# Patient Record
Sex: FEMALE | Race: WHITE | NOT HISPANIC OR LATINO | Employment: OTHER | URBAN - METROPOLITAN AREA
[De-identification: names, ages, dates, MRNs, and addresses within clinical notes are randomized per-mention and may not be internally consistent; named-entity substitution may affect disease eponyms.]

---

## 2017-01-06 ENCOUNTER — TELEPHONE (OUTPATIENT)
Dept: NEUROLOGY | Facility: CLINIC | Age: 72
End: 2017-01-06

## 2017-01-06 NOTE — TELEPHONE ENCOUNTER
Reason for Call:  Other records    Detailed comments: pt calling would like to know if Dr. Zacarias has received her records yet from [facility]. She has an appt coming up on Feb 1st    Phone Number Patient can be reached at: Home number on file [phone] (home)    Best Time: any     Can we leave a detailed message on this number? YES    Call taken on 1/6/2017 at 1:36 PM by Adeola Hubbard

## 2017-01-31 DIAGNOSIS — Z53.9 ERRONEOUS ENCOUNTER--DISREGARD: Primary | ICD-10-CM

## 2017-01-31 PROBLEM — M85.80 OSTEOPENIA: Status: ACTIVE | Noted: 2017-01-31

## 2017-01-31 RX ORDER — CITALOPRAM HYDROBROMIDE 40 MG/1
40 TABLET ORAL DAILY
COMMUNITY
Start: 2016-07-26 | End: 2021-09-01

## 2017-01-31 RX ORDER — METHOCARBAMOL 750 MG/1
750 TABLET, FILM COATED ORAL 4 TIMES DAILY PRN
COMMUNITY
Start: 2016-12-05 | End: 2021-08-06

## 2017-01-31 RX ORDER — LANOLIN ALCOHOL/MO/W.PET/CERES
400 CREAM (GRAM) TOPICAL DAILY
COMMUNITY
Start: 2013-06-25 | End: 2021-08-06

## 2017-01-31 RX ORDER — UREA 10 %
500 LOTION (ML) TOPICAL DAILY
COMMUNITY
Start: 2012-08-24 | End: 2021-08-06

## 2017-01-31 RX ORDER — LANOLIN ALCOHOL/MO/W.PET/CERES
250 CREAM (GRAM) TOPICAL DAILY
COMMUNITY
Start: 2009-05-18 | End: 2021-08-06

## 2017-01-31 RX ORDER — AMITRIPTYLINE HYDROCHLORIDE 50 MG/1
50 TABLET ORAL AT BEDTIME
COMMUNITY
Start: 2016-10-11 | End: 2021-08-06

## 2017-01-31 RX ORDER — DESOXIMETASONE 2.5 MG/G
1 CREAM TOPICAL 2 TIMES DAILY
COMMUNITY
Start: 2016-12-05 | End: 2017-02-01

## 2017-02-01 ENCOUNTER — TELEPHONE (OUTPATIENT)
Dept: RHEUMATOLOGY | Facility: CLINIC | Age: 72
End: 2017-02-01

## 2017-02-01 ENCOUNTER — OFFICE VISIT (OUTPATIENT)
Dept: NEUROLOGY | Facility: CLINIC | Age: 72
End: 2017-02-01
Payer: COMMERCIAL

## 2017-02-01 VITALS
BODY MASS INDEX: 29.06 KG/M2 | HEART RATE: 86 BPM | TEMPERATURE: 98 F | DIASTOLIC BLOOD PRESSURE: 82 MMHG | HEIGHT: 64 IN | WEIGHT: 170.2 LBS | SYSTOLIC BLOOD PRESSURE: 118 MMHG

## 2017-02-01 DIAGNOSIS — G44.85 PRIMARY STABBING HEADACHE: Primary | ICD-10-CM

## 2017-02-01 PROCEDURE — 99204 OFFICE O/P NEW MOD 45 MIN: CPT | Performed by: PSYCHIATRY & NEUROLOGY

## 2017-02-01 RX ORDER — INDOMETHACIN 75 MG/1
75 CAPSULE, EXTENDED RELEASE ORAL
Qty: 30 CAPSULE | Refills: 2 | Status: SHIPPED | OUTPATIENT
Start: 2017-02-01 | End: 2017-02-22

## 2017-02-01 NOTE — MR AVS SNAPSHOT
"              After Visit Summary   2/1/2017    Irene Mullins    MRN: [id number]           Patient Information     Date Of Birth          1945        Visit Information        Provider Department      2/1/2017 8:45 AM Lory Zacarias MD [facility]        Care Instructions    Plan:    Indomethacin 75mg daily. Remember this does increase your risk of bleeding, so do avoid aspirin/ibuprofen/Aleve when taking this medication. *Information provided.  Let me know if you notice any improvement in about 2 weeks. We can adjust the dose if needed.  Return to clinic in 2 months.        Follow-ups after your visit        Who to contact     If you have questions or need follow up information about today's clinic visit or your schedule please contact [facility] directly at [phone].  Normal or non-critical lab and imaging results will be communicated to you by MyChart, letter or phone within 4 business days after the clinic has received the results. If you do not hear from us within 7 days, please contact the clinic through MyChart or phone. If you have a critical or abnormal lab result, we will notify you by phone as soon as possible.  Submit refill requests through [employer] or call your pharmacy and they will forward the refill request to us. Please allow 3 business days for your refill to be completed.          Additional Information About Your Visit        MyChart Information     [employer] lets you send messages to your doctor, view your test results, renew your prescriptions, schedule appointments and more. To sign up, go to [email or web address] . Click on \"Log in\" on the left side of the screen, which will take you to the Welcome page. Then click on \"Sign up Now\" on the right side of the page.     You will be asked to enter the access code listed below, as well as some personal information. Please follow the directions to create your username and password.     Your access code is: " [email or web address]  Expires: 2017  8:26 AM     Your access code will  in 90 days. If you need help or a new code, please call your [facility] clinic or [phone].        Care EveryWhere ID     This is your Care EveryWhere ID. This could be used by other organizations to access your [facility] medical records  [id number]        Your Vitals Were     Pulse Temperature Height BMI (Body Mass Index) Breastfeeding?       86 98  F (36.7  C) (Oral) 5' 3.5\" (1.613 m) 29.67 kg/m2 No        Blood Pressure from Last 3 Encounters:   17 118/82    Weight from Last 3 Encounters:   17 170 lb 3.2 oz (77.202 kg)              Today, you had the following     No orders found for display         Today's Medication Changes          These changes are accurate as of: 17  9:31 AM.  If you have any questions, ask your nurse or doctor.               Stop taking these medicines if you haven't already. Please contact your care team if you have questions.     desoximetasone 0.25 % cream   Commonly known as:  TOPICORT   Stopped by:  Lory Zacarias MD                    Primary Care Provider Office Phone # Fax #    [facility] [phone]       [address] [address]        Thank you!     Thank you for choosing [facility]  for your care. Our goal is always to provide you with excellent care. Hearing back from our patients is one way we can continue to improve our services. Please take a few minutes to complete the written survey that you may receive in the mail after your visit with us. Thank you!             Your Updated Medication List - Protect others around you: Learn how to safely use, store and throw away your medicines at www.disposemymeds.org.          This list is accurate as of: 17  9:31 AM.  Always use your most recent med list.                   Brand Name Dispense Instructions for use    amitriptyline 50 MG tablet    ELAVIL     Take 50 mg by " mouth At Bedtime       citalopram 40 MG tablet    celeXA     Take 40 mg by mouth daily       cyanocolbalamin 500 MCG tablet    vitamin  B-12     Take 500 mcg by mouth daily Three times weekly        ULTRA STRENGTH 2000 UNITS Caps   Generic drug:  cholecalciferol      Take 2,000 Units by mouth daily       folic acid 400 MCG tablet    FOLVITE     Take 400 mcg by mouth daily       LIQUID CALCIUM/VITAMIN D 600-200 MG-UNIT Caps   Generic drug:  calcium carbonate-vitamin D      Take 1 tablet by mouth daily       magnesium 500 MG Tabs      Take 500 mg by mouth daily Taking three days weekly       methocarbamol 750 MG tablet   Generic drug:  methocarbamol      Take 750 mg by mouth 4 times daily as needed       MILK OF MAGNESIA PO      Take by mouth every 48 hours as needed       NEW MED      Take 750 mg by mouth daily L-Citruline       niacin 250 MG CR capsule      Take 250 mg by mouth daily       ranitidine 150 MG tablet    ZANTAC     Take 150 mg by mouth daily as needed

## 2017-02-01 NOTE — PROGRESS NOTES
INITIAL NEUROLOGY CONSULTATION    DATE OF VISIT: 2/1/2017  MRN: [id number]  PATIENT NAME: Irene Mullins  YOB: 1945    REFERRING PROVIDER: Gail Bojorquez    Chief Complaint   Patient presents with     Consult     Headache.  Referral by Gail Arce NP-- Bridget Plaza.       SUBJECTIVE:                                                      HPI:   Irene Mullins is a 71 year old female who presents for evaluation of headaches. The patient was seen by a neurologist through [facility] previously. I have one note from this provider. The patient is not sure why she was not sent back to Dr. Ch instead of here. The patient has been complaining of new lancinating head pain which started in 6.2016. She also has a history of migraines and tension-type headaches. She has said that these new pains are unique from her prior headaches. Evaluation included a brain MRI which revealed a meningioma (stable on repeat study, felt to be incidental finding). She does have reported degenerative disc disease in the cervical spine as well. She has tried Topamax which made her foggy and did not provide effective relief. Imitrex and Medrol dose pack were also ineffective. Sed rate was normal as was a temporal artery biopsy (10.2016).    The patient is accompanied by her  in clinic today. She tells me that the pains actually started around April. She subsequently had cataract surgery and the pain continued, and in fact became more frequent by summertime last year. She says the stabbing pain is always on the right (she points to parietal region), no specific triggers. It can occur anytime. Sometimes aspirin and/or Tylenol take the edge off. She had been taking 25mg of amitriptyline for IBS, so this was increased. No change in the stabbing pain frequency. She does endorse some residual tenderness at the temporal artery biopsy site, but otherwise no real change in the headaches since June. She says these occur  daily, spells can be seconds-almost one hour in duration. She denies visual changes. She does have chronic problems with runny nose and post-nasal drip but these do not correlate with the headaches. She does note Right eye puffiness when she wakes in the morning. She also says that sometimes she feels like her tongue is cramping in the morning. She denies difficulty with chewing/swallow. She has popping in the right side of her jaw though, not new. She has not discussed this with her dentist. Her  says that her memory has not been as good as it used to be since the headaches started.     No past medical history on file.  No past surgical history on file.      Current Outpatient Prescriptions on File Prior to Visit:  amitriptyline (ELAVIL) 50 MG tablet Take 50 mg by mouth At Bedtime   calcium carbonate-vitamin D (LIQUID CALCIUM/VITAMIN D) 600-200 MG-UNIT CAPS Take 1 tablet by mouth daily   cholecalciferol ( ULTRA STRENGTH) 2000 UNITS CAPS Take 2,000 Units by mouth daily   citalopram (CELEXA) 40 MG tablet Take 40 mg by mouth daily   cyanocolbalamin (VITAMIN  B-12) 500 MCG tablet Take 500 mcg by mouth daily Three times weekly   folic acid (FOLVITE) 400 MCG tablet Take 400 mcg by mouth daily   NEW MED Take 750 mg by mouth daily L-Citruline   niacin 250 MG CR capsule Take 250 mg by mouth daily   magnesium 500 MG TABS Take 500 mg by mouth daily Taking three days weekly   methocarbamol (METHOCARBAMOL) 750 MG tablet Take 750 mg by mouth 4 times daily as needed   ranitidine (ZANTAC) 150 MG tablet Take 150 mg by mouth daily as needed     No current facility-administered medications on file prior to visit.  Allergies   Allergen Reactions     Oxycodone-Acetaminophen      Other reaction(s): Dizziness, GI Upset     Sulfamethoxazole-Trimethoprim      Other reaction(s): Other - Describe In Comment Field  Hot flashes, face and arms red for 1 hour after taking medication     Tramadol Nausea and Vomiting     Valproic Acid   "    Other reaction(s): Other - Describe In Comment Field  Hair loss, elevated liver enzymes        Problem (# of Occurrences) Relation (Name,Age of Onset)    Coronary Artery Disease (1) Sister (Justine)    DIABETES (1) Sister (Justine)    Hyperlipidemia (2) Brother (Corky), Sister (Justine)    Hypertension (1) Mother       Negative family history of: CEREBROVASCULAR DISEASE, Breast Cancer, Colon Cancer, Prostate Cancer        Social History   Substance Use Topics     Smoking status: Former Smoker     Smokeless tobacco: Not on file     Alcohol Use: Not on file       REVIEW OF SYSTEMS:                                                      10-point review of systems is negative except as mentioned above in HPI.     EXAM:                                                      Physical Exam:   Vitals: /82 mmHg  Pulse 86  Temp(Src) 98  F (36.7  C) (Oral)  Ht 5' 3.5\" (1.613 m)  Wt 170 lb 3.2 oz (77.202 kg)  BMI 29.67 kg/m2  Breastfeeding? No  BMI= Body mass index is 29.67 kg/(m^2).  GENERAL: NAD.  HEENT: Tenderness over healing scar on Rt temple.  Neurologic:  MENTAL STATUS: Alert, attentive. Speech is fluent. Normal comprehension. Mini-cog: 3/5. (missed 2 words on recall). Normal concentration. Adequate fund of knowledge.   CRANIAL NERVES: Discs flat. Visual fields intact to confrontation. Pupils equally, round and reactive to light. Facial sensation and movement normal. EOM full. Hearing intact to conversation. Trapezius strength intact. Palate moves symmetrically. Tongue midline.  MOTOR: 4+/5 in proximal and distal muscle groups of upper and lower extremities. Tone and bulk normal.   DTRs: Intact and symmetric. Babinski down-going bilaterally.   SENSATION: Normal light touch and pinprick. Intact proprioception. Vibration: Slightly decreased at both ankles.   COORDINATION: Normal finger nose finger. Finger tapping normal. Knee heel shin normal.  STATION AND GAIT: Slight sway with Romberg. Tandem minimally " unsteady.  CV: RRR. S1, S2.   NECK: No bruits.    ASSESSMENT and PLAN:                                                      Assessment and Plan:     ICD-10-CM    1. 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 Mullins is a pleasant 70 yo woman with history of migraine and tension-type headaches. She developed new head pain, with characteristics consistent with primary stabbing headache last year. The pain continues despite extensive work-up and multiple medications. She has not tried indomethacin, so I would like to start with a daily dose of this to see if this provides effective headache management. I explained to the patient that this does not necessarily need to be a long-term medication, but I think we should give it a several week trial, barring any side effects idiopathic thrombocytopenic purpura is noted that this increases her bleed risk, especially with concurrent use of amitriptyline and citalopram. We discussed the side effects. Patient understands and agrees with the plan.     Patient Instructions:  Indomethacin 75mg daily. Remember this does increase your risk of bleeding, so do avoid aspirin/ibuprofen/Aleve when taking this medication. *Information provided.  Let me know if you notice any improvement in about 2 weeks. We can adjust the dose if needed.  Return to clinic in 2 months.    Total Time: 45 minutes were spent with the patient. More than 50% of the time spent on counseling (as described above in Assessment and Plan) /coordinating the care.    Lory Zacarias MD  Neurology    CC: Gail [facility] NP

## 2017-02-01 NOTE — PATIENT INSTRUCTIONS
Plan:    Indomethacin 75mg daily. Remember this does increase your risk of bleeding, so do avoid aspirin/ibuprofen/Aleve when taking this medication. *Information provided.  Let me know if you notice any improvement in about 2 weeks. We can adjust the dose if needed.  Return to clinic in 2 months.

## 2017-02-01 NOTE — NURSING NOTE
"Chief Complaint   Patient presents with     Consult     Headache.  Referral by Gail Arce NP-- Bridget Plaza.       Initial /82 mmHg  Pulse 86  Temp(Src) 98  F (36.7  C) (Oral)  Ht 5' 3.5\" (1.613 m)  Wt 170 lb 3.2 oz (77.202 kg)  BMI 29.67 kg/m2  Breastfeeding? No Estimated body mass index is 29.67 kg/(m^2) as calculated from the following:    Height as of this encounter: 5' 3.5\" (1.613 m).    Weight as of this encounter: 170 lb 3.2 oz (77.202 kg).  BP completed using cuff size: regular    Patient prefers to be contacted: [phone]  Okay to leave detailed message on voicemail: yes    Erica HERNANDEZ-CMA      "

## 2017-02-06 NOTE — TELEPHONE ENCOUNTER
Received notice by [employer]/ [employer] and [employer] that indomethacin is approved 11/5/16 to 2/3/18.  #[id number].

## 2017-02-07 ENCOUNTER — TELEPHONE (OUTPATIENT)
Dept: NEUROLOGY | Facility: CLINIC | Age: 72
End: 2017-02-07

## 2017-02-07 NOTE — TELEPHONE ENCOUNTER
Advised  Reviewed precautions to monitor for GI Erosion ect.    Stefanie Ku RN  [facility]

## 2017-02-07 NOTE — TELEPHONE ENCOUNTER
I called to talk to Irene today but she is not home until 11:30 am.   She is having heartburn when she takes her indocin. This is a very common side effect of this medication. She can take heartburn medication unless she has any contraindication to that particular heartburn medication. We will try back later. Olinda HOGAN RN

## 2017-02-15 ENCOUNTER — TELEPHONE (OUTPATIENT)
Dept: NEUROLOGY | Facility: CLINIC | Age: 72
End: 2017-02-15

## 2017-02-15 NOTE — TELEPHONE ENCOUNTER
Reason for Call:  Other prescription    Detailed comments: pt calling rubi she has been on indomethacin for about 2 weeks. Was told to call in and let dr know how she is doing on it.  The first week she states was good. Over all it has been better  5 episodes in 1 hour on the 12 th, 4 in the morning on the 13 th, yesterday she had 1, today she had 3, not has severe most episodes are in the morning.     Phone Number Patient can be reached at: Home number on file [phone] (home)    Best Time: any     Can we leave a detailed message on this number? YES    Call taken on 2/15/2017 at 10:28 AM by Adeola Hubbard

## 2017-02-22 ENCOUNTER — TELEPHONE (OUTPATIENT)
Dept: NEUROLOGY | Facility: CLINIC | Age: 72
End: 2017-02-22

## 2017-02-22 DIAGNOSIS — G44.85 PRIMARY STABBING HEADACHE: ICD-10-CM

## 2017-02-22 NOTE — TELEPHONE ENCOUNTER
We could try adding a pm dose of 75mg. This should be ok in terms of side effects because she can take it before bedtime and avoid the fogginess. I think it might be too soon to say that the medication is not working. That being said, if she develops severe / prolonged headache again she may need to seek acute care through an ED. Regarding alternatives, I think this is     She can take MOM with the medication. She can also take a PPI such as omeprazole if needed.     I would ask that Irene make an appointment to follow-up, since she has several concerns. We can discuss alternative treatments at that time. Hopefully the above will be helpful in the meantime.     Lory Zacarias

## 2017-02-22 NOTE — TELEPHONE ENCOUNTER
Call placed to patient to   Around the 13th she started having the morning episodes ( stabbing pain intermit lasting only 60 seconds then repeat)  Next day only had one  Then next day had three etc- it varied.     Then a few days later had severe 10/10 on pain scale lasted several minutes longer than ever before then a really bad one happened again this morning.  Indomethacin dose did not change abort the headache course.   ( Usually takes some time to kick in but this time could not tell a difference at all.)  (Can tell when the Indomethacin is taken because has dizziness and Fog about 1 hour later. = did however have it with Oatmeal vs a simple english muffin this morning).    She states she is no longer having the stomach pain or heartburn with the medication.  Denies any side effect besides the fogginess.  1:  Please advise on Medication or alternative meds?    Other questions are...    2:  Are there any stomach meds that would interfere if heartburn were to return? RN search found No interactions found or any that would delay absorption per [employer]. (can we advise pt of this?)          3:   Can she take her MOM when she needs it or should she not ever take it the same time as the Indomethacin-  RN found no restrictions on taking the med in correlation to Indomethacin doses. (can we  advise patient of this?)

## 2017-02-22 NOTE — TELEPHONE ENCOUNTER
Reason for Call:  Patient is calling in states that originally the indomethacin was working well however it has stopped working    She has follow up appt scheduled for April 4th    Detailed comments: please advise recommendations    Phone Number Patient can be reached at: Home number on file [phone] (home)    Best Time: any    Can we leave a detailed message on this number? YES    Call taken on 2/22/2017 at 9:53 AM by Kate Chu

## 2017-02-22 NOTE — TELEPHONE ENCOUNTER
Advised.  Confirmed she has the April 4 appt.  Stefanie Ku RN  [facility]

## 2017-02-23 RX ORDER — INDOMETHACIN 75 MG/1
75 CAPSULE, EXTENDED RELEASE ORAL 2 TIMES DAILY WITH MEALS
Qty: 60 CAPSULE | Refills: 2 | Status: SHIPPED | OUTPATIENT
Start: 2017-02-23 | End: 2021-08-06

## 2017-02-23 NOTE — TELEPHONE ENCOUNTER
Previous rx required PA for 1 daily.  Now will be taking BID.  May need another STAT PA completed.    Stefanie Ku RN  [facility]

## 2017-02-23 NOTE — TELEPHONE ENCOUNTER
Patient is calling inquiring when new rx for the indomethacin would be sent to [facility]?    She will be out by Sunday    She can be reached at [phone]

## 2017-02-27 NOTE — TELEPHONE ENCOUNTER
Received notice from [employer] that they are not her pharmacy medication .  They're asking that we call [employer] to determine who should receive the PA.      [facility], since this needs to be stat, per RN, would you be able to do this PA?  Thank you so much.

## 2017-03-09 ENCOUNTER — TELEPHONE (OUTPATIENT)
Dept: NEUROLOGY | Facility: CLINIC | Age: 72
End: 2017-03-09

## 2017-03-09 NOTE — TELEPHONE ENCOUNTER
Reason for Call:  Patient is calling in reports that at first the increase in dose of the indomethacin was helpful however in the past 3 days she is waking up with headaches again    Detailed comments: please advis    Phone Number Patient can be reached at: Home number on file [phone] (home)    Best Time: any    Can we leave a detailed message on this number? YES    Call taken on 3/9/2017 at 9:16 AM by Kate Chu

## 2017-03-10 NOTE — TELEPHONE ENCOUNTER
I do not think we should increase the dose further at this point. If the headaches continue, I would like Irene to come in to clinic next week (use Weds or Thurs 12:45 slot). If the pain acutely worsens, she should go to the ED.    CY

## 2017-03-13 NOTE — TELEPHONE ENCOUNTER
Patient wanted to schedule for this week.  She's been scheduled for 1245 on Wednesday 3/15/17.  She will cancel if feeling better.

## 2017-03-15 ENCOUNTER — TELEPHONE (OUTPATIENT)
Dept: PALLIATIVE MEDICINE | Facility: CLINIC | Age: 72
End: 2017-03-15

## 2017-03-15 ENCOUNTER — OFFICE VISIT (OUTPATIENT)
Dept: NEUROLOGY | Facility: CLINIC | Age: 72
End: 2017-03-15
Payer: COMMERCIAL

## 2017-03-15 VITALS — HEART RATE: 84 BPM | TEMPERATURE: 98.3 F | SYSTOLIC BLOOD PRESSURE: 134 MMHG | DIASTOLIC BLOOD PRESSURE: 86 MMHG

## 2017-03-15 DIAGNOSIS — G43.909 MIXED MIGRAINE AND MUSCLE CONTRACTION HEADACHE: Primary | ICD-10-CM

## 2017-03-15 DIAGNOSIS — G44.209 MIXED MIGRAINE AND MUSCLE CONTRACTION HEADACHE: Primary | ICD-10-CM

## 2017-03-15 DIAGNOSIS — G44.85 PRIMARY STABBING HEADACHE: ICD-10-CM

## 2017-03-15 PROCEDURE — 99214 OFFICE O/P EST MOD 30 MIN: CPT | Performed by: PSYCHIATRY & NEUROLOGY

## 2017-03-15 NOTE — PATIENT INSTRUCTIONS
Plan:    Continue the indomethacin for now: 75mg twice daily.  Try melatonin: 3mg at bedtime.   Let me know if you notice any headache improvement within about one week. We may need to adjust the dose.  Referral to pain clinic.  Return to clinic in 3 months.

## 2017-03-15 NOTE — TELEPHONE ENCOUNTER
Left VM for patient to schedule a new evaluation.      Mayte MERCADO    [facility]

## 2017-03-15 NOTE — PROGRESS NOTES
ESTABLISHED PATIENT NEUROLOGY NOTE    DATE OF VISIT: 3/15/2017  MRN: [id number]  PATIENT NAME: Irene Mullins  YOB: 1945    Chief Complaint   Patient presents with     RECHECK     Headaches.     SUBJECTIVE:                                                      HISTORY OF PRESENT ILLNESS:  Irene is here for follow up regarding headaches. The patient has a history of migraine headaches, tension-type headaches and newer headaches with characteristics of primary stabbing headaches.The patient had extensive work-up through her previous and no underlying etiology for the pain was found. She has tried several medications without achieving effectiveness, including: Topamax, Imitrex and steroids. Given the stabbing nature of her pain, we decided to try Indomethacin. She did develop some heartburn initially but noted improvement of the headaches. Now she returns for an urgent consult to again address the headaches.    The patient is again accompanied by her  in clinic today. She says that she had clusters of brief stabbing pain both last night and this morning. She continues to take the indomethacin at 75mg BID and notices that perhaps it makes her briefly tired. No longer any GI upset from the medication. She did notice some blood in her urine recently but evaluation led to diagnosis of UTI. The migraines and tension-type headaches continue to be rare.     The stabbing pain occurs in one localized area in the right parietal region. The pain sometimes lancinates toward the right eye. Again,t he episodes are seconds-long but she can have several in one hour's time. She denies tearing of the eye or runny nose with the pain. She continues to have tenderness of her temporal artery biopsy spot but the pain seems unrelated to this. She says that sleep is good.      The patient reminds me that she has also tried acupuncture, chiropractic treatments and physical therapy for headaches and neck pain and has found  these methods only transiently helpful.     History includes stable right temporal meningioma. Most recent imaging November 2016.       CURRENT MEDICATIONS:     Current Outpatient Prescriptions on File Prior to Visit:  indomethacin (INDOCIN SR) 75 MG CR capsule Take 1 capsule (75 mg) by mouth 2 times daily (with meals)   Magnesium Hydroxide (MILK OF MAGNESIA PO) Take by mouth every 48 hours as needed   amitriptyline (ELAVIL) 50 MG tablet Take 50 mg by mouth At Bedtime   calcium carbonate-vitamin D (LIQUID CALCIUM/VITAMIN D) 600-200 MG-UNIT CAPS Take 1 tablet by mouth daily   cholecalciferol ( ULTRA STRENGTH) 2000 UNITS CAPS Take 2,000 Units by mouth daily   citalopram (CELEXA) 40 MG tablet Take 40 mg by mouth daily   cyanocolbalamin (VITAMIN  B-12) 500 MCG tablet Take 500 mcg by mouth daily Three times weekly   folic acid (FOLVITE) 400 MCG tablet Take 400 mcg by mouth daily   NEW MED Take 750 mg by mouth daily L-Citruline   niacin 250 MG CR capsule Take 250 mg by mouth daily   magnesium 500 MG TABS Take 500 mg by mouth daily Taking three days weekly   ranitidine (ZANTAC) 150 MG tablet Take 150 mg by mouth daily as needed Reported on 3/15/2017   methocarbamol (METHOCARBAMOL) 750 MG tablet Take 750 mg by mouth 4 times daily as needed Reported on 3/15/2017     No current facility-administered medications on file prior to visit.       REVIEW OF SYSTEMS:                                                      10-point review of systems is negative except as mentioned above in HPI.    EXAM:                                                      Physical Exam:   Vitals: /86 (BP Location: Right arm, Patient Position: Chair, Cuff Size: Adult Regular)  Pulse 84  Temp 98.3  F (36.8  C) (Oral)  BMI= There is no height or weight on file to calculate BMI.  HEENT: Tenderness over healing scar on Rt temple. Area of stabbing pain is above the scar.   Neurologic:  MENTAL STATUS: Alert, attentive. Speech is fluent. Normal  comprehension. Normal concentration. Adequate fund of knowledge.   CRANIAL NERVES: Discs flat. Visual fields intact to confrontation. Pupils equally, round and reactive to light. Facial sensation and movement normal. EOM full. Hearing intact to conversation. Trapezius strength intact. Palate moves symmetrically. Tongue midline.  MOTOR: 4+/5 in proximal and distal muscle groups of upper and lower extremities with brief testing. Tone and bulk normal.   COORDINATION: Normal finger nose finger.  CV: RRR. S1, S2.   NECK: No bruits.    ASSESSMENT and PLAN:                                                      Assessment and Plan:    ICD-10-CM    1. Mixed migraine and muscle contraction headache G43.909 [facility] ([place] REFERRAL    G44.209    2. Primary stabbing headache G44.85 [facility] ([place] REFERRAL      Ms. Mullins is a pleasant 70 yo woman with history of mixed headaches and newer headache consistent with primary stabbing headache. We discussed alternatives / adjunctive therapy for the indomethacin. We will try adding melatonin and see if she experiences any improvement with this. For the long-term, I am not sure if the indomethacin is best. The patient is not interested in increasing the amitriptyline (and I am not certain this would help with the current headaches anyway). Given that she has tried several medications and alternative treatments without lasting effectiveness, I suggested that we have her consult with a pain specialist for additional ideas. The patient understands and agrees with the plan.     After the patient left clinic, I was thinking that we could consider treating the patient with a neuropathic pain medication such as gabapentin as an alternative, if the current plan is ineffective.     Patient Instructions:  Continue the indomethacin for now: 75mg twice daily.  Try melatonin: 3mg at bedtime.   Let me know if you notice any headache improvement within about one  week. We may need to adjust the dose.  Referral to pain clinic.  Return to clinic in 3 months.     Total Time: 25 minutes were spent with the patient. More than 50% of the time spent on counseling (as described above in Assessment and Plan) /coordinating the care.    Lory Zacarias MD  Neurology

## 2017-03-15 NOTE — LETTER
March 15, 2017    Irene Mullins  [address] [address] [facility]  [place] [address]    Dear Irene,                                                                 Welcome to the [facility].  We are located on the 2nd floor ([address]) of the [facility], located at [address] [place], [place] [address], [place] [address].    Your appointment at the [facility] has been scheduled on Wednesday MAY 10,2017 at 1:00 PM with Hannah Quiroga NP and CARLOS ZENG LP    At your first visit, you will meet your team of caregivers who will help you to develop pain management strategies that will last a lifetime. You will meet with our support staff to review your insurance information, and collect your co-payment if required by your insurance company. You will also meet with a medical pain specialist, health psychologist, and care coordinator who will assess your pain and develop a plan of care for your successful pain rehabilitation. You should expect to spend 2-3 hours at your first visit with us. Usually, patients work with us for a period of 6-12 months, and eventually return to their primary doctor once their pain management has stabilized.     To help us make your visit go as smoothly as possible, please bring the following items with you on your visit:     Completed Pain Questionnaire enclosed in this packet.  If you do not bring the completed questionnaire, we may have to reschedule your appointment.  List of any medicines that you are currently taking or have been prescribed  Important NON-[facility] medical information such as medical records or tests results (X-rays, or laboratory tests)  Your health insurance card  Financial resources to cover your co-payment or balance due at the time of service (cash, personal check, Visa, and MasterCard are acceptable methods of payment)     Due to the demand for new patient evaluations, you must notify the scheduling department 48 hours in advance if you are  not able to keep this appointment. Failure to do so could affect your ability to reschedule with our clinic. Please be aware that we will not prescribe any medications at your first visit.     Please call [phone] with any questions regarding your appointment. We look forward to meeting you and working to address your health care needs.     Sincerely,    [facility]

## 2017-03-15 NOTE — MR AVS SNAPSHOT
After Visit Summary   3/15/2017    Irene Mullins    MRN: [id number]           Patient Information     Date Of Birth          1945        Visit Information        Provider Department      3/15/2017 12:45 PM Lory Zacarias MD [facility]        Today's Diagnoses     Mixed migraine and muscle contraction headache    -  1    Primary stabbing headache          Care Instructions    Plan:    Continue the indomethacin for now: 75mg twice daily.  Try melatonin: 3mg at bedtime.   Let me know if you notice any headache improvement within about one week. We may need to adjust the dose.  Referral to pain clinic.  Return to clinic in 3 months.         Follow-ups after your visit        Additional Services     [facility] ([place]) REFERRAL       Your provider has referred you to the [facility].    Reason for Referral: Comprehensive Evaluation and Management    Please complete the following questions:    What is your diagnosis for the patient's pain? Mixed headaches, difficult to control    Do you have any specific questions for the pain specialist? No    Are there any red flags that may impact the assessment or management of the patient? None    **ANY DIAGNOSTIC TESTS THAT ARE NOT IN EPIC SHOULD BE SENT TO THE PAIN CENTER**    Please note the Pre-Op Pain Consult must be scheduled 2-3 weeks prior to the patient's surgery.  Patient's trying to schedule within 2 weeks of surgery may not be accommodated.     Pre-Op Pain Consults are only good for 30 days.    REGARDING OPIOID MEDICATIONS:  We will always address appropriateness of opioid pain medications, but we generally will not automatically take on a prescribing role. When we do take on prescribing of opioids for chronic pain, it is in collaboration with the referring physician for an intermediate period of time (months), with an expectation that the primary physician or provider will assume the prescribing role if  medications are effective at stable doses with demonstrated compliance.  Therefore, please do not assume that your prescribing responsibilities end on the day of pain clinic consultation.  Is this agreeable to you? YES    For any questions, contact the [facility] at [phone].    Please be aware that coverage of these services is subject to the terms and limitations of your health insurance plan.  Call member services at your health plan with any benefit or coverage questions.      Please bring the following with you to your appointment:    (1) Any X-Rays, CTs or MRIs which have been performed.  Contact the facility where they were done to arrange for  prior to your scheduled appointment.    (2) List of current medications   (3) This referral request   (4) Any documents/labs given to you for this referral                  Your next 10 appointments already scheduled     Apr 04, 2017  9:30 AM CDT   Return Visit with Lory Zacarias MD   [facility]    [address] [id number] [phone]              Who to contact     If you have questions or need follow up information about today's clinic visit or your schedule please contact [facility] directly at [phone].  Normal or non-critical lab and imaging results will be communicated to you by MyChart, letter or phone within 4 business days after the clinic has received the results. If you do not hear from us within 7 days, please contact the clinic through MyChart or phone. If you have a critical or abnormal lab result, we will notify you by phone as soon as possible.  Submit refill requests through [employer] or call your pharmacy and they will forward the refill request to us. Please allow 3 business days for your refill to be completed.          Additional Information About Your Visit        [employer] Information     [employer] lets you send messages to your  "doctor, view your test results, renew your prescriptions, schedule appointments and more. To sign up, go to [email or web address] . Click on \"Log in\" on the left side of the screen, which will take you to the Welcome page. Then click on \"Sign up Now\" on the right side of the page.     You will be asked to enter the access code listed below, as well as some personal information. Please follow the directions to create your username and password.     Your access code is: [email or web address]  Expires: 2017  9:26 AM     Your access code will  in 90 days. If you need help or a new code, please call your [facility] clinic or [phone].        Care EveryWhere ID     This is your Care EveryWhere ID. This could be used by other organizations to access your [facility] medical records  [id number]        Your Vitals Were     Pulse Temperature                84 98.3  F (36.8  C) (Oral)           Blood Pressure from Last 3 Encounters:   03/15/17 134/86   17 118/82    Weight from Last 3 Encounters:   17 170 lb 3.2 oz (77.2 kg)              We Performed the Following     [facility] ([place]) REFERRAL        Primary Care Provider Office Phone # Fax #    Gail JENKINS Ambrose Bojorquez [phone]       [address] [address]        Thank you!     Thank you for choosing [facility]  for your care. Our goal is always to provide you with excellent care. Hearing back from our patients is one way we can continue to improve our services. Please take a few minutes to complete the written survey that you may receive in the mail after your visit with us. Thank you!             Your Updated Medication List - Protect others around you: Learn how to safely use, store and throw away your medicines at www.disposemymeds.org.          This list is accurate as of: 3/15/17  1:27 PM.  Always use your most recent med list.                   Brand Name Dispense Instructions " for use    amitriptyline 50 MG tablet    ELAVIL     Take 50 mg by mouth At Bedtime       citalopram 40 MG tablet    celeXA     Take 40 mg by mouth daily       cyanocolbalamin 500 MCG tablet    vitamin  B-12     Take 500 mcg by mouth daily Three times weekly        ULTRA STRENGTH 2000 UNITS Caps   Generic drug:  cholecalciferol      Take 2,000 Units by mouth daily       FISH OIL PO      Take 1,200 mg by mouth daily       folic acid 400 MCG tablet    FOLVITE     Take 400 mcg by mouth daily       indomethacin 75 MG CR capsule    INDOCIN SR    60 capsule    Take 1 capsule (75 mg) by mouth 2 times daily (with meals)       LIQUID CALCIUM/VITAMIN D 600-200 MG-UNIT Caps   Generic drug:  calcium carbonate-vitamin D      Take 1 tablet by mouth daily       magnesium 500 MG Tabs      Take 500 mg by mouth daily Taking three days weekly       methocarbamol 750 MG tablet   Generic drug:  methocarbamol      Take 750 mg by mouth 4 times daily as needed Reported on 3/15/2017       MILK OF MAGNESIA PO      Take by mouth every 48 hours as needed       NEW MED      Take 750 mg by mouth daily L-Citruline       niacin 250 MG CR capsule      Take 250 mg by mouth daily       ranitidine 150 MG tablet    ZANTAC     Take 150 mg by mouth daily as needed Reported on 3/15/2017

## 2017-03-15 NOTE — NURSING NOTE
"Chief Complaint   Patient presents with     RECHECK     Headaches.       Initial /86 (BP Location: Right arm, Patient Position: Chair, Cuff Size: Adult Regular)  Pulse 84  Temp 98.3  F (36.8  C) (Oral) Estimated body mass index is 29.68 kg/(m^2) as calculated from the following:    Height as of 2/1/17: 5' 3.5\" (1.613 m).    Weight as of 2/1/17: 170 lb 3.2 oz (77.2 kg).  Medication Reconciliation: complete    Patient prefers to be contacted: [phone]  Okay to leave detailed message on voicemail: yes    Erica Degroot NR-CMA    "

## 2017-03-23 ENCOUNTER — TELEPHONE (OUTPATIENT)
Dept: NEUROLOGY | Facility: CLINIC | Age: 72
End: 2017-03-23

## 2017-03-23 NOTE — TELEPHONE ENCOUNTER
Reason for Call:  Other prescription    Detailed comments: pt calling stating she was to call and f/u to let dr know how the medication has been working. She has been taking indomethacin and melatonin for about 2 weeks now and states that she has been sleeping well w/ no bad episodes. She feels it is working better taking both medications      Phone Number Patient can be reached at: Home number on file [phone] (home)    Best Time: any     Can we leave a detailed message on this number? YES    Call taken on 3/23/2017 at 10:51 AM by Adeola Hubbard

## 2017-03-25 ENCOUNTER — MEDICAL CORRESPONDENCE (OUTPATIENT)
Dept: HEALTH INFORMATION MANAGEMENT | Facility: CLINIC | Age: 72
End: 2017-03-25

## 2017-03-30 ENCOUNTER — TELEPHONE (OUTPATIENT)
Dept: NEUROLOGY | Facility: CLINIC | Age: 72
End: 2017-03-30

## 2017-05-02 ENCOUNTER — TELEPHONE (OUTPATIENT)
Dept: NEUROLOGY | Facility: CLINIC | Age: 72
End: 2017-05-02

## 2017-05-02 NOTE — TELEPHONE ENCOUNTER
To Dr. Zacarias as SADAFI.     Valeri Diggs  [facility] RN

## 2017-05-02 NOTE — TELEPHONE ENCOUNTER
Reason for Call:  Patient is calling in reports that she is taking melatonin and indomethacin and she has had much improvement    She has cancelled appts at Pain Clinic as well as follow up with Dr Zacarias as PCP will be following her now    Detailed comments: see above    Phone Number Patient can be reached at: Home number on file [phone] (home)    Best Time: any    Can we leave a detailed message on this number? YES    Call taken on 5/2/2017 at 3:54 PM by Kate Chu

## 2020-08-11 NOTE — TELEPHONE ENCOUNTER
Received fax notice from [facility] that patient's Indomethacin needs a PA (Neurology).     verbal instruction/written material

## 2021-08-06 ENCOUNTER — OFFICE VISIT (OUTPATIENT)
Dept: FAMILY MEDICINE | Facility: CLINIC | Age: 76
End: 2021-08-06
Payer: COMMERCIAL

## 2021-08-06 VITALS
BODY MASS INDEX: 23.09 KG/M2 | HEART RATE: 65 BPM | TEMPERATURE: 97.7 F | DIASTOLIC BLOOD PRESSURE: 72 MMHG | SYSTOLIC BLOOD PRESSURE: 110 MMHG | WEIGHT: 132.4 LBS | RESPIRATION RATE: 12 BRPM | OXYGEN SATURATION: 99 %

## 2021-08-06 DIAGNOSIS — M62.81 GENERALIZED MUSCLE WEAKNESS: ICD-10-CM

## 2021-08-06 DIAGNOSIS — R41.3 MEMORY LOSS: Primary | ICD-10-CM

## 2021-08-06 DIAGNOSIS — D49.7 NEOPLASM OF MENINGES: ICD-10-CM

## 2021-08-06 LAB
ANION GAP SERPL CALCULATED.3IONS-SCNC: 6 MMOL/L (ref 3–14)
BASOPHILS # BLD AUTO: 0 10E3/UL (ref 0–0.2)
BASOPHILS NFR BLD AUTO: 1 %
BUN SERPL-MCNC: 12 MG/DL (ref 7–30)
CALCIUM SERPL-MCNC: 9.3 MG/DL (ref 8.5–10.1)
CHLORIDE BLD-SCNC: 101 MMOL/L (ref 94–109)
CO2 SERPL-SCNC: 28 MMOL/L (ref 20–32)
CREAT SERPL-MCNC: 0.78 MG/DL (ref 0.52–1.04)
CRP SERPL-MCNC: <2.9 MG/L (ref 0–8)
EOSINOPHIL # BLD AUTO: 0.1 10E3/UL (ref 0–0.7)
EOSINOPHIL NFR BLD AUTO: 2 %
ERYTHROCYTE [DISTWIDTH] IN BLOOD BY AUTOMATED COUNT: 13.5 % (ref 10–15)
ERYTHROCYTE [SEDIMENTATION RATE] IN BLOOD BY WESTERGREN METHOD: 10 MM/HR (ref 0–30)
GFR SERPL CREATININE-BSD FRML MDRD: 75 ML/MIN/1.73M2
GLUCOSE BLD-MCNC: 85 MG/DL (ref 70–99)
HCT VFR BLD AUTO: 39.1 % (ref 35–47)
HGB BLD-MCNC: 12.6 G/DL (ref 11.7–15.7)
LYMPHOCYTES # BLD AUTO: 2.6 10E3/UL (ref 0.8–5.3)
LYMPHOCYTES NFR BLD AUTO: 42 %
MCH RBC QN AUTO: 30 PG (ref 26.5–33)
MCHC RBC AUTO-ENTMCNC: 32.2 G/DL (ref 31.5–36.5)
MCV RBC AUTO: 93 FL (ref 78–100)
MONOCYTES # BLD AUTO: 0.5 10E3/UL (ref 0–1.3)
MONOCYTES NFR BLD AUTO: 8 %
NEUTROPHILS # BLD AUTO: 3 10E3/UL (ref 1.6–8.3)
NEUTROPHILS NFR BLD AUTO: 48 %
PLATELET # BLD AUTO: 264 10E3/UL (ref 150–450)
POTASSIUM BLD-SCNC: 4 MMOL/L (ref 3.4–5.3)
RBC # BLD AUTO: 4.2 10E6/UL (ref 3.8–5.2)
SODIUM SERPL-SCNC: 135 MMOL/L (ref 133–144)
TSH SERPL DL<=0.005 MIU/L-ACNC: 0.97 MU/L (ref 0.4–4)
WBC # BLD AUTO: 6.2 10E3/UL (ref 4–11)

## 2021-08-06 PROCEDURE — 86140 C-REACTIVE PROTEIN: CPT | Performed by: FAMILY MEDICINE

## 2021-08-06 PROCEDURE — 85652 RBC SED RATE AUTOMATED: CPT | Performed by: FAMILY MEDICINE

## 2021-08-06 PROCEDURE — 85025 COMPLETE CBC W/AUTO DIFF WBC: CPT | Performed by: FAMILY MEDICINE

## 2021-08-06 PROCEDURE — 36415 COLL VENOUS BLD VENIPUNCTURE: CPT | Performed by: FAMILY MEDICINE

## 2021-08-06 PROCEDURE — 84443 ASSAY THYROID STIM HORMONE: CPT | Performed by: FAMILY MEDICINE

## 2021-08-06 PROCEDURE — 82607 VITAMIN B-12: CPT | Performed by: FAMILY MEDICINE

## 2021-08-06 PROCEDURE — 99204 OFFICE O/P NEW MOD 45 MIN: CPT | Performed by: FAMILY MEDICINE

## 2021-08-06 PROCEDURE — 80048 BASIC METABOLIC PNL TOTAL CA: CPT | Performed by: FAMILY MEDICINE

## 2021-08-06 RX ORDER — MEMANTINE HYDROCHLORIDE 5 MG-10 MG
1 KIT ORAL
COMMUNITY
End: 2021-11-12

## 2021-08-06 RX ORDER — GINSENG 100 MG
CAPSULE ORAL
COMMUNITY
End: 2022-11-30

## 2021-08-06 NOTE — NURSING NOTE
"Initial /72   Pulse 65   Temp 97.7  F (36.5  C) (Tympanic)   Resp 12   Wt 60.1 kg (132 lb 6.4 oz)   SpO2 99%   BMI 23.09 kg/m   Estimated body mass index is 23.09 kg/m  as calculated from the following:    Height as of 2/1/17: 1.613 m (5' 3.5\").    Weight as of this encounter: 60.1 kg (132 lb 6.4 oz). .      "

## 2021-08-06 NOTE — PROGRESS NOTES
Assessment & Plan     Memory loss  On namenda  Has not seen neurologist to her knowledge  - TSH with free T4 reflex; Future  - Vitamin B12; Future  - TSH with free T4 reflex  - Vitamin B12    Generalized muscle weakness  R/o PMR    - CBC with platelets and differential; Future  - TSH with free T4 reflex; Future  - Basic metabolic panel  (Ca, Cl, CO2, Creat, Gluc, K, Na, BUN); Future  - ESR: Erythrocyte sedimentation rate; Future  - CRP, inflammation; Future  - CBC with platelets and differential  - TSH with free T4 reflex  - Basic metabolic panel  (Ca, Cl, CO2, Creat, Gluc, K, Na, BUN)  - ESR: Erythrocyte sedimentation rate  - CRP, inflammation    Meningioma  Told she only needs MRI every 2 years now    Review of prior external note(s) from - Outside records from [facility]             No follow-ups on file.    Lynda Bal MD  [facility]    Kacy Bonilla is a 76 year old who presents for the following health issues     HPI     New Patient/Transfer of Care  Moved from [place] - I take care of her  Bud also.        Left Leg Pain    Symptoms 2 weeks, states no injury/incident.     States pain from left lateral hip to to shin or toes.     Feels like cramping in the muscles.    Will wake with cramps at times.    States that no movements or activity increase the pain.    Has tried pain cream and Tylenol extra strength BID. States not helpful.    Wondering if this is due to medication mementine for memory.    Has done physical therapy for balance    Rates pain at  7/10 while at rest, 9/10 at worst.    Charley horses in her legs - hurts from left hip all the way down to her toes.  Lasted about an hours.  Has been going on for six months or so.    No fall or injury.   Sometimes will get a few hours of sleep (melatonin and tylenol).     Had meclizine for vertigo  Did PT for balance  Sometimes will get lightheaded if she goes around a corner or gets up to quickly.      Is on  Memantine for her memory      Headaches  States that she gets Ice pick headaches for years.  States that she gets very sharp stabbing pains on right side of head.  States that she has seen specialists for this, but they are unable to find a cause.  Will take extra strength Tylenol for pain.  Medications for nausea (mecliazine)  makes nausea worse.      States that she also has pain and weakness in her bilateral wrists    Review of Systems   Constitutional, HEENT, cardiovascular, pulmonary, gi and gu systems are negative, except as otherwise noted.      Objective    /72   Pulse 65   Temp 97.7  F (36.5  C) (Tympanic)   Resp 12   Wt 60.1 kg (132 lb 6.4 oz)   SpO2 99%   BMI 23.09 kg/m    Body mass index is 23.09 kg/m .  Physical Exam   GENERAL: healthy, alert and no distress  NECK: no adenopathy, no asymmetry, masses, or scars and thyroid normal to palpation  RESP: lungs clear to auscultation - no rales, rhonchi or wheezes  CV: regular rate and rhythm, normal S1 S2, no S3 or S4, no murmur, click or rub, no peripheral edema and peripheral pulses strong  ABDOMEN: soft, nontender, no hepatosplenomegaly, no masses and bowel sounds normal  MS: no gross musculoskeletal defects noted, no edema  NEURO: sensory exam grossly normal and mentation intact for the most part. Does have some forgetful moments.  Strength -  equal bilaterally but not very strong  Hip flexors are 4/5  Quads tend to tremor/fasiculate when they are tested    Results for orders placed or performed in visit on 08/06/21 (from the past 24 hour(s))   CBC with platelets and differential    Narrative    The following orders were created for panel order CBC with platelets and differential.  Procedure                               Abnormality         Status                     ---------                               -----------         ------                     CBC with platelets and d...[[id number]]                      Final result                  Please view results for these tests on the individual orders.   ESR: Erythrocyte sedimentation rate   Result Value Ref Range    Erythrocyte Sedimentation Rate 10 0 - 30 mm/hr   CBC with platelets and differential   Result Value Ref Range    WBC Count 6.2 4.0 - 11.0 10e3/uL    RBC Count 4.20 3.80 - 5.20 10e6/uL    Hemoglobin 12.6 11.7 - 15.7 g/dL    Hematocrit 39.1 35.0 - 47.0 %    MCV 93 78 - 100 fL    MCH 30.0 26.5 - 33.0 pg    MCHC 32.2 31.5 - 36.5 g/dL    RDW 13.5 10.0 - 15.0 %    Platelet Count 264 150 - 450 10e3/uL    % Neutrophils 48 %    % Lymphocytes 42 %    % Monocytes 8 %    % Eosinophils 2 %    % Basophils 1 %    Absolute Neutrophils 3.0 1.6 - 8.3 10e3/uL    Absolute Lymphocytes 2.6 0.8 - 5.3 10e3/uL    Absolute Monocytes 0.5 0.0 - 1.3 10e3/uL    Absolute Eosinophils 0.1 0.0 - 0.7 10e3/uL    Absolute Basophils 0.0 0.0 - 0.2 10e3/uL

## 2021-08-07 LAB — VIT B12 SERPL-MCNC: 506 PG/ML (ref 193–986)

## 2021-09-01 ENCOUNTER — OFFICE VISIT (OUTPATIENT)
Dept: FAMILY MEDICINE | Facility: CLINIC | Age: 76
End: 2021-09-01
Payer: COMMERCIAL

## 2021-09-01 VITALS
HEART RATE: 69 BPM | BODY MASS INDEX: 23.57 KG/M2 | TEMPERATURE: 97.6 F | DIASTOLIC BLOOD PRESSURE: 70 MMHG | HEIGHT: 63 IN | WEIGHT: 133 LBS | SYSTOLIC BLOOD PRESSURE: 112 MMHG | RESPIRATION RATE: 16 BRPM | OXYGEN SATURATION: 100 %

## 2021-09-01 DIAGNOSIS — R41.3 MEMORY LOSS: ICD-10-CM

## 2021-09-01 DIAGNOSIS — G89.29 CHRONIC NONINTRACTABLE HEADACHE, UNSPECIFIED HEADACHE TYPE: ICD-10-CM

## 2021-09-01 DIAGNOSIS — F41.9 ANXIETY: ICD-10-CM

## 2021-09-01 DIAGNOSIS — Z00.00 ENCOUNTER FOR MEDICARE ANNUAL WELLNESS EXAM: Primary | ICD-10-CM

## 2021-09-01 DIAGNOSIS — R51.9 CHRONIC NONINTRACTABLE HEADACHE, UNSPECIFIED HEADACHE TYPE: ICD-10-CM

## 2021-09-01 DIAGNOSIS — D69.2 SENILE PURPURA (H): ICD-10-CM

## 2021-09-01 DIAGNOSIS — G47.62 NOCTURNAL LEG CRAMPS: ICD-10-CM

## 2021-09-01 PROCEDURE — G0438 PPPS, INITIAL VISIT: HCPCS | Performed by: FAMILY MEDICINE

## 2021-09-01 PROCEDURE — 99213 OFFICE O/P EST LOW 20 MIN: CPT | Mod: 25 | Performed by: FAMILY MEDICINE

## 2021-09-01 RX ORDER — CITALOPRAM HYDROBROMIDE 40 MG/1
40 TABLET ORAL DAILY
Qty: 90 TABLET | Refills: 3 | Status: SHIPPED | OUTPATIENT
Start: 2021-09-01 | End: 2022-06-09

## 2021-09-01 RX ORDER — MEMANTINE HYDROCHLORIDE 5 MG/1
5 TABLET ORAL 2 TIMES DAILY
Qty: 90 TABLET | Refills: 3 | Status: SHIPPED | OUTPATIENT
Start: 2021-09-01 | End: 2021-11-12

## 2021-09-01 ASSESSMENT — PATIENT HEALTH QUESTIONNAIRE - PHQ9
SUM OF ALL RESPONSES TO PHQ QUESTIONS 1-9: 2
SUM OF ALL RESPONSES TO PHQ QUESTIONS 1-9: 2

## 2021-09-01 ASSESSMENT — ENCOUNTER SYMPTOMS
HEADACHES: 1
SORE THROAT: 0
BREAST MASS: 0
CHILLS: 0
HEMATOCHEZIA: 0
COUGH: 0
DIARRHEA: 0
DIZZINESS: 1
HEMATURIA: 0
DYSURIA: 0
PARESTHESIAS: 1
ABDOMINAL PAIN: 1
SHORTNESS OF BREATH: 0
EYE PAIN: 0
ARTHRALGIAS: 1
NAUSEA: 0
WEAKNESS: 0
PALPITATIONS: 0
FREQUENCY: 1
FEVER: 0
NERVOUS/ANXIOUS: 1
HEARTBURN: 0
JOINT SWELLING: 0
CONSTIPATION: 1
MYALGIAS: 1

## 2021-09-01 ASSESSMENT — ACTIVITIES OF DAILY LIVING (ADL)
CURRENT_FUNCTION: TELEPHONE REQUIRES ASSISTANCE
CURRENT_FUNCTION: SHOPPING REQUIRES ASSISTANCE
CURRENT_FUNCTION: TRANSPORTATION REQUIRES ASSISTANCE

## 2021-09-01 ASSESSMENT — MIFFLIN-ST. JEOR: SCORE: 1072.41

## 2021-09-01 ASSESSMENT — PAIN SCALES - GENERAL: PAINLEVEL: NO PAIN (0)

## 2021-09-01 NOTE — PROGRESS NOTES
"SUBJECTIVE:   Irene Mullins is a 76 year old female who presents for Preventive Visit.      Patient has been advised of split billing requirements and indicates understanding: Yes   Are you in the first 12 months of your Medicare coverage?  No    Healthy Habits:     In general, how would you rate your overall health?  Fair    Frequency of exercise:  6-7 days/week    Duration of exercise:  15-30 minutes    Do you usually eat at least 4 servings of fruit and vegetables a day, include whole grains    & fiber and avoid regularly eating high fat or \"junk\" foods?  Yes    Taking medications regularly:  Yes    Medication side effects:  None    Ability to successfully perform activities of daily living:  Telephone requires assistance, transportation requires assistance and shopping requires assistance    Home Safety:  No safety concerns identified    Hearing Impairment:  No hearing concerns    In the past 6 months, have you been bothered by leaking of urine?  No    In general, how would you rate your overall mental or emotional health?  Fair      PHQ-2 Total Score: 1    Additional concerns today:  Yes    Do you feel safe in your environment? Yes    Have you ever done Advance Care Planning? (For example, a Health Directive, POLST, or a discussion with a medical provider or your loved ones about your wishes): Yes, patient states has an Advance Care Planning document and will bring a copy to the clinic.       Fall risk  Fallen 2 or more times in the past year?: No  Any fall with injury in the past year?: No    Cognitive Screening   1) Repeat 3 items (Leader, Season, Table)    2) Clock draw: ABNORMAL incorrect hand placements   3) 3 item recall: Recalls 2 objects   Results: ABNORMAL clock, 1-2 items recalled: PROBABLE COGNITIVE IMPAIRMENT, **INFORM PROVIDER**    On Namenda - no longer driving.     Mini-CogTM Copyright LINDY iDaz. Licensed by the author for use in [facility]; reprinted with permission (claudia@.[facility]). " All rights reserved.      Do you have sleep apnea, excessive snoring or daytime drowsiness?: no    Reviewed and updated as needed this visit by clinical staff  Tobacco  Allergies  Meds   Med Hx  Surg Hx  Fam Hx  Soc Hx        Reviewed and updated as needed this visit by Provider                Social History     Tobacco Use     Smoking status: Former Smoker     Smokeless tobacco: Never Used   Substance Use Topics     Alcohol use: Not Currently     Alcohol/week: 0.0 standard drinks     If you drink alcohol do you typically have >3 drinks per day or >7 drinks per week? No    Alcohol Use 9/1/2021   Prescreen: >3 drinks/day or >7 drinks/week? No   No flowsheet data found.        Anxiety Follow-Up    How are you doing with your anxiety since your last visit? Worsened with memory loss    Are you having other symptoms that might be associated with anxiety? No    Have you had a significant life event? OTHER: move to this area, not driving     Are you feeling depressed? No    Do you have any concerns with your use of alcohol or other drugs? No    Social History     Tobacco Use     Smoking status: Former Smoker     Smokeless tobacco: Never Used   Vaping Use     Vaping Use: Never used   Substance Use Topics     Alcohol use: Not Currently     Alcohol/week: 0.0 standard drinks     Drug use: None     No flowsheet data found.  PHQ 9/1/2021   PHQ-9 Total Score 2   Q9: Thoughts of better off dead/self-harm past 2 weeks Not at all     Last PHQ-9 9/1/2021   1.  Little interest or pleasure in doing things 0   2.  Feeling down, depressed, or hopeless 0   3.  Trouble falling or staying asleep, or sleeping too much 0   4.  Feeling tired or having little energy 1   5.  Poor appetite or overeating 0   6.  Feeling bad about yourself 0   7.  Trouble concentrating 1   8.  Moving slowly or restless 0   Q9: Thoughts of better off dead/self-harm past 2 weeks 0   PHQ-9 Total Score 2     No flowsheet data found.      Current providers sharing  "in care for this patient include:   Patient Care Team:  Lynda Bal MD as PCP - General (Family Medicine)  Lynda Bal MD as Assigned PCP    The following health maintenance items are reviewed in Epic and correct as of today:  Health Maintenance Due   Topic Date Due     DEXA  Never done     ANNUAL REVIEW OF HM ORDERS  Never done     ADVANCE CARE PLANNING  Never done     DEPRESSION ACTION PLAN  Never done     MAMMO SCREENING  Never done     COLORECTAL CANCER SCREENING  Never done     HEPATITIS C SCREENING  Never done     LIPID  Never done     MEDICARE ANNUAL WELLNESS VISIT  Never done     FALL RISK ASSESSMENT  Never done     INFLUENZA VACCINE (1) 09/01/2021               Review of Systems   Constitutional: Negative for chills and fever.   HENT: Negative for congestion, ear pain, hearing loss and sore throat.    Eyes: Negative for pain and visual disturbance.   Respiratory: Negative for cough and shortness of breath.    Cardiovascular: Negative for chest pain, palpitations and peripheral edema.   Gastrointestinal: Positive for abdominal pain and constipation. Negative for diarrhea, heartburn, hematochezia and nausea.   Breasts:  Negative for tenderness, breast mass and discharge.   Genitourinary: Positive for frequency. Negative for dysuria, genital sores, hematuria, pelvic pain, urgency, vaginal bleeding and vaginal discharge.   Musculoskeletal: Positive for arthralgias and myalgias. Negative for joint swelling.   Skin: Negative for rash.   Neurological: Positive for dizziness, headaches and paresthesias. Negative for weakness.   Psychiatric/Behavioral: Negative for mood changes. The patient is nervous/anxious.      Constitutional, HEENT, cardiovascular, pulmonary, gi and gu systems are negative, except as otherwise noted.    OBJECTIVE:   /70   Pulse 69   Temp 97.6  F (36.4  C) (Tympanic)   Resp 16   Ht 1.6 m (5' 3\")   Wt 60.3 kg (133 lb)   SpO2 100%   Breastfeeding No   BMI 23.56 kg/m   " "Estimated body mass index is 23.56 kg/m  as calculated from the following:    Height as of this encounter: 1.6 m (5' 3\").    Weight as of this encounter: 60.3 kg (133 lb).  Physical Exam  GENERAL: healthy, alert and no distress  NECK: no adenopathy, no asymmetry, masses, or scars and thyroid normal to palpation  RESP: lungs clear to auscultation - no rales, rhonchi or wheezes  CV: regular rate and rhythm, normal S1 S2, no S3 or S4, no murmur, click or rub, no peripheral edema and peripheral pulses strong  ABDOMEN: soft, nontender, no hepatosplenomegaly, no masses and bowel sounds normal  MS: no gross musculoskeletal defects noted, no edema  SKIN: + senile purpura  PSYCH: affect normal/bright and somewhat forgetful    Diagnostic Test Results:  Labs reviewed in Epic    ASSESSMENT / PLAN:   (Z00.00) Encounter for Medicare annual wellness exam  (primary encounter diagnosis)  Comment:    Plan:      (R41.3) Memory loss  Comment:  Declines seeing neurology again  Plan: memantine (NAMENDA) 5 MG tablet             (F41.9) Anxiety  Comment:  stable  Plan: citalopram (CELEXA) 40 MG tablet             (D69.2) Senile purpura (H)  Comment: discussed this  Plan: sun protection    (R51.9,  G89.29) Chronic nonintractable headache, unspecified headache type  Comment: declined neurology fu  Plan:      Nocturnal leg cramps  Stretching/warm packs  Increase fluid intake    Patient has been advised of split billing requirements and indicates understanding: Yes  COUNSELING:  Reviewed preventive health counseling, as reflected in patient instructions       Regular exercise       Healthy diet/nutrition    Estimated body mass index is 23.56 kg/m  as calculated from the following:    Height as of this encounter: 1.6 m (5' 3\").    Weight as of this encounter: 60.3 kg (133 lb).        She reports that she has quit smoking. She has never used smokeless tobacco.      Appropriate preventive services were discussed with this patient, including " applicable screening as appropriate for cardiovascular disease, diabetes, osteopenia/osteoporosis, and glaucoma.  As appropriate for age/gender, discussed screening for colorectal cancer, prostate cancer, breast cancer, and cervical cancer. Checklist reviewing preventive services available has been given to the patient.    Reviewed patients plan of care and provided an AVS. The Basic Care Plan (routine screening as documented in Health Maintenance) for Irene meets the Care Plan requirement. This Care Plan has been established and reviewed with the Patient.    Counseling Resources:  ATP IV Guidelines  Pooled Cohorts Equation Calculator  Breast Cancer Risk Calculator  Breast Cancer: Medication to Reduce Risk  FRAX Risk Assessment  ICSI Preventive Guidelines  Dietary Guidelines for Americans, 2010  USDA's MyPlate  ASA Prophylaxis  Lung CA Screening    Lynda Bal MD  [facility]    Identified Health Risks:

## 2021-09-01 NOTE — PATIENT INSTRUCTIONS
Patient Education     Leg Cramps  A muscle cramp or spasm is a strong contraction of the muscle fibers. It is also called a charley horse. This may occur in the foot, calf, or thigh at night when the legs are elevated. If the spasm is prolonged, it can become very painful. This may be caused by sleeping in an uncomfortable position, muscle fatigue, poor muscle tone from lack of exercise and stretching, dehydration, electrolyte imbalance, diabetes, alcohol use, and certain medicine.  Home care    Drink plenty of fluids during the day to prevent dehydration.    Stretch your legs before bedtime.    Eat a diet high in potassium. These foods include fresh fruit, such as bananas, oranges, cantaloupe, and honeydew melon. It also includes apple, prune, orange, grape and pineapple juices. Other foods high in potassium are white, red, and goff beans, baked potatoes, raw spinach, cod, flounder, halibut, salmon, and scallops.    Talk with your healthcare provider about taking mineral and vitamin supplements that contain magnesium and vitamin B-12 if you are not already taking these. Other prescription medicines may also be used.    Stay away from stimulants such as caffeine, nicotine, and decongestants.  How to relieve an acute leg cramp    For mild pain, getting out of the bed and walking may help. Some people find relief with heat and massage. You can apply heat with a warm shower, bath, or compress. Some people feel better with a cold packs. You can make an ice pack by filling a plastic bag that seals at the top with ice cubes and then wrapping it with a thin towel. Try both and use the method that feels best for 15 to 20 minutes at a time.    For severe pain, stretching the muscle that is in spasm may quickly relieve the pain.    When the spasm is in your foot, your toes may curl up or down. To stretch the muscle in spasm, bend your toes in the opposite direction. If the spasm pulls your toes up, bend them down. If the  spasm pulls them down, bend them up.    When the spasm is in your calf, bend the ankle so the foot points upward toward your knee.    When the spasm is in your thigh, bend or straighten the knee and hip until you feel relief.  Follow-up care  Follow up with your healthcare provider, or as advised.  When to seek medical advice  Call your healthcare provider right away if any of these occur:    Walking makes your pain worse and rest makes it better    You develop weakness in the affected leg    Pain or frequency of spasms increases and is not controlled by the above measures  Michelle last reviewed this educational content on 5/1/2018 2000-2021 [employer]. All rights reserved. This information is not intended as a substitute for professional medical care. Always follow your healthcare professional's instructions.

## 2021-10-08 ENCOUNTER — ALLIED HEALTH/NURSE VISIT (OUTPATIENT)
Dept: FAMILY MEDICINE | Facility: CLINIC | Age: 76
End: 2021-10-08
Payer: COMMERCIAL

## 2021-10-08 DIAGNOSIS — Z23 HIGH PRIORITY FOR 2019-NCOV VACCINE: Primary | ICD-10-CM

## 2021-10-08 PROCEDURE — 91300 PR COVID VAC PFIZER DIL RECON 30 MCG/0.3 ML IM: CPT

## 2021-10-08 PROCEDURE — 90471 IMMUNIZATION ADMIN: CPT

## 2021-10-08 PROCEDURE — 99207 PR NO CHARGE NURSE ONLY: CPT

## 2021-10-08 PROCEDURE — 90662 IIV NO PRSV INCREASED AG IM: CPT

## 2021-10-08 PROCEDURE — 0004A PR COVID VAC PFIZER DIL RECON 30 MCG/0.3 ML IM: CPT

## 2021-11-12 ENCOUNTER — OFFICE VISIT (OUTPATIENT)
Dept: FAMILY MEDICINE | Facility: CLINIC | Age: 76
End: 2021-11-12
Payer: COMMERCIAL

## 2021-11-12 VITALS
BODY MASS INDEX: 23.74 KG/M2 | HEIGHT: 63 IN | SYSTOLIC BLOOD PRESSURE: 112 MMHG | OXYGEN SATURATION: 98 % | DIASTOLIC BLOOD PRESSURE: 70 MMHG | HEART RATE: 64 BPM | RESPIRATION RATE: 14 BRPM | WEIGHT: 134 LBS | TEMPERATURE: 96.1 F

## 2021-11-12 DIAGNOSIS — D49.7 NEOPLASM OF MENINGES: ICD-10-CM

## 2021-11-12 DIAGNOSIS — R41.3 MEMORY LOSS: ICD-10-CM

## 2021-11-12 DIAGNOSIS — G56.03 BILATERAL CARPAL TUNNEL SYNDROME: Primary | ICD-10-CM

## 2021-11-12 PROCEDURE — 99214 OFFICE O/P EST MOD 30 MIN: CPT | Performed by: FAMILY MEDICINE

## 2021-11-12 RX ORDER — MEMANTINE HYDROCHLORIDE 5 MG/1
5 TABLET ORAL DAILY
Qty: 90 TABLET | Refills: 3 | Status: SHIPPED | OUTPATIENT
Start: 2021-11-12 | End: 2021-11-15

## 2021-11-12 ASSESSMENT — MIFFLIN-ST. JEOR: SCORE: 1076.95

## 2021-11-12 ASSESSMENT — PAIN SCALES - GENERAL: PAINLEVEL: MILD PAIN (2)

## 2021-11-12 NOTE — PATIENT INSTRUCTIONS
Ortho  should call you to schedule with the hand therapist    MRI of the brain [phone]      Our Clinic hours are:  Mondays    7:20 am - 7 pm  Tues -  Fri  7:20 am - 5 pm    Clinic Phone: [phone]    The clinic lab opens at 7:30 am Mon - Fri and appointments are required.    [facility]. [phone]  Monday  8 am - 7pm  Tues - Fri 8 am - 5:30 pm

## 2021-11-12 NOTE — PROGRESS NOTES
Assessment & Plan     Bilateral carpal tunnel syndrome   right wrist splint dispensed  Recommend follow up with Dr. Obregon  - Orthopedic  Referral; Future  - Wrist/Arm/Hand Supplies Order for DME - ONLY FOR DME    Neoplasm of meninges   worsening headaches   - MR Brain w/o Contrast; Future    Memory loss   continue once daily  - memantine (NAMENDA) 5 MG tablet; Take 1 tablet (5 mg) by mouth daily      No follow-ups on file.    Lynda Bal MD  [facility]    Kacy Bonilla is a 76 year old who presents for the following health issues     HPI     Musculoskeletal problem/pain  Onset/Duration: October  Description  Location: wrist - bilateral right is worse  Joint Swelling: no  Redness: no  Pain: YES- pins and needles  Warmth: no  Intensity:  moderate  Progression of Symptoms:  worsening  Accompanying signs and symptoms:   Fevers: no  Numbness/tingling/weakness: YES- unable to  pots or cans  History  Trauma to the area: no  Recent illness:  no  Previous similar problem: no  Previous evaluation:  no  Precipitating or alleviating factors:  Aggravating factors include: overuse  Therapies tried and outcome: heat, ice and icy hot    Headache  Onset/Duration: couple years  Description  Location: right side   Character: sharp pain  Frequency:  Got better and now getting more frequent daily - just started again in the past week, had been gone for a few months.   Duration:  A couple sec- min  Wake with headaches: no  Able to do daily activities when headache present: no   Intensity:  moderate  Progression of Symptoms:  worsening  Accompanying signs and symptoms:  Stiff neck: no  Neck or upper back pain: no  Sinus or URI symptoms no   Fever: no  Nausea or vomiting: no  Dizziness: YES- both medication does cause dizziness she is questioning if this causing it.  Numbness/tingling: no  Weakness: no  Visual changes: pain in right eye  History  Head trauma: no  Family history of  "migraines: no  Daily pain medication use: no  Previous tests for headaches: YES  Neurologist evaluation: YES  Precipitating or Alleviating factors (light/sound/sleep/caffeine): n/a  Therapies tried and outcome: Tylenol           Outcome - usually effective  Frequent/daily pain medication use: no    Has known meningioma (right middle cranial fossa) - last imaged 8/10/2020  Worsening headaches as above.       Review of Systems   Constitutional, HEENT, cardiovascular, pulmonary, gi and gu systems are negative, except as otherwise noted.      Objective    /70   Pulse 64   Temp (!) 96.1  F (35.6  C) (Tympanic)   Resp 14   Ht 1.6 m (5' 3\")   Wt 60.8 kg (134 lb)   SpO2 98%   Breastfeeding No   BMI 23.74 kg/m    Body mass index is 23.74 kg/m .  Physical Exam   GENERAL: healthy, alert and no distress  NECK: no adenopathy, no asymmetry, masses, or scars and thyroid normal to palpation  RESP: lungs clear to auscultation - no rales, rhonchi or wheezes  CV: regular rate and rhythm, normal S1 S2, no S3 or S4, no murmur, click or rub, no peripheral edema and peripheral pulses strong  ABDOMEN: soft, nontender, no hepatosplenomegaly, no masses and bowel sounds normal  MS: +tinel   strength equal bilaterally                "

## 2021-11-13 DIAGNOSIS — R41.3 MEMORY LOSS: ICD-10-CM

## 2021-11-15 RX ORDER — MEMANTINE HYDROCHLORIDE 5 MG/1
5 TABLET ORAL DAILY
Qty: 90 TABLET | Refills: 1 | Status: SHIPPED | OUTPATIENT
Start: 2021-11-15 | End: 2022-06-09 | Stop reason: DRUGHIGH

## 2021-11-23 ENCOUNTER — HOSPITAL ENCOUNTER (OUTPATIENT)
Dept: MRI IMAGING | Facility: CLINIC | Age: 76
Discharge: HOME | End: 2021-11-23
Attending: FAMILY MEDICINE | Admitting: FAMILY MEDICINE
Payer: COMMERCIAL

## 2021-11-23 DIAGNOSIS — D49.7 NEOPLASM OF MENINGES: ICD-10-CM

## 2021-11-23 PROCEDURE — 70551 MRI BRAIN STEM W/O DYE: CPT

## 2021-11-24 ENCOUNTER — TELEPHONE (OUTPATIENT)
Dept: FAMILY MEDICINE | Facility: CLINIC | Age: 76
End: 2021-11-24
Payer: COMMERCIAL

## 2021-11-24 NOTE — TELEPHONE ENCOUNTER
Pt calling us to ask more details about her MRI results.  She says she has been diagnosed with memory issues and wants to make sure she knows what is going on in her brain.  She made an appt for Dec 23rd.

## 2021-11-24 NOTE — TELEPHONE ENCOUNTER
Pt was seen on 11/12/21.  Pt states the Namenda 5 mg was increased to 2 tabs/day @ that time.  Med list shows Namenda 5 mg 1 tab/day #90 + 1 refill/day written on 11/15/21.  New orders?  Advise.  KPavelRROMELIA

## 2021-12-06 ENCOUNTER — TRANSFERRED RECORDS (OUTPATIENT)
Dept: HEALTH INFORMATION MANAGEMENT | Facility: CLINIC | Age: 76
End: 2021-12-06
Payer: COMMERCIAL

## 2021-12-23 ENCOUNTER — VIRTUAL VISIT (OUTPATIENT)
Dept: FAMILY MEDICINE | Facility: CLINIC | Age: 76
End: 2021-12-23
Payer: COMMERCIAL

## 2021-12-23 DIAGNOSIS — R41.3 MEMORY LOSS: ICD-10-CM

## 2021-12-23 DIAGNOSIS — D49.7 NEOPLASM OF MENINGES: Primary | ICD-10-CM

## 2021-12-23 PROCEDURE — 99213 OFFICE O/P EST LOW 20 MIN: CPT | Mod: 95 | Performed by: FAMILY MEDICINE

## 2021-12-23 NOTE — PROGRESS NOTES
Irene is a 76 year old who is being evaluated via a billable telephone visit.      What phone number would you like to be contacted at? [phone]  How would you like to obtain your AVS? Mail a copy    Assessment & Plan     Neoplasm of meninges  stable    Memory loss  Patient has changed her mind about seeing the neurologist.  A new referral was provided.    - Neuropsychology Referral; Future  - Adult Neurology Referral; Future      No follow-ups on file.    Lynda Bal MD  [facility]   Irene is a 76 year old who presents for the following health issues     HPI     Concern - Results  Onset: 11/23/21  Description: Discuss MRI Results.  Memory is becoming worse with each day.    She wanted to talk about her MRI.  She also wants to revisit the idea of seeing the neurologist.         MRI OF THE BRAIN WITHOUT CONTRAST November 23, 2021 10:10 AM      HISTORY: Brain mass or lesion. Known right middle cranial fossa  meningioma 22 x 21 x 19 mm (TR/AP/CC) - last MRI 8/2020. Recent  worsening of right frontal/right parietal headaches. Neoplasm of  meninges.      TECHNIQUE:   Brain: Axial diffusion-weighted with ADC map, T2-weighted with fat  saturation, T1-weighted and turboFLAIR and sagittal T1-weighted images  of the brain were obtained without intravenous contrast.      COMPARISON: None.     FINDINGS: There is a T1 and T2 hypointense nodule along the floor of  the right middle cranial fossa measuring 2.2 x 1.9 x 1.8 cm in size  consistent with meningioma described in the patient's history. There  is moderate diffuse cerebral volume loss. There are numerous scattered  focal and minimal patchy periventricular areas of abnormal T2 signal  hyperintensity in the cerebral white matter bilaterally that are  consistent with sequela of chronic small vessel ischemic disease.      The ventricles and basal cisterns are within normal limits in  configuration given the degree of cerebral  volume loss. There is no  midline shift. There are no extra-axial fluid collections. There is no  evidence for stroke or acute intracranial hemorrhage.      There is no sinusitis or mastoiditis.                                                                      IMPRESSION:    1. 2.2 x 1.9 x 1.8 cm extra-axial nodule along the floor of the right  middle cranial fossa consistent with the meningioma described in the  patient's history.  2. Diffuse cerebral volume loss and cerebral white matter changes  consistent with chronic small vessel ischemic disease.     PHILOMENA POTTS MD                   Review of Systems   Constitutional, HEENT, cardiovascular, pulmonary, gi and gu systems are negative, except as otherwise noted.      Objective           Vitals:  No vitals were obtained today due to virtual visit.    Physical Exam   healthy, alert and no distress  PSYCH: Alert and oriented times 3; coherent speech, normal   rate and volume, able to articulate logical thoughts, able   to abstract reason, no tangential thoughts, no hallucinations   or delusions  Her affect is normal  RESP: No cough, no audible wheezing, able to talk in full sentences  Remainder of exam unable to be completed due to telephone visits                Phone call duration: 8 minutes

## 2022-03-16 ENCOUNTER — TELEPHONE (OUTPATIENT)
Dept: FAMILY MEDICINE | Facility: CLINIC | Age: 77
End: 2022-03-16
Payer: COMMERCIAL

## 2022-03-16 NOTE — TELEPHONE ENCOUNTER
Patient Quality Outreach    Patient is due for the following:   Colon Cancer Screening -  FIT, Colonoscopy and Cologuard  Breast Cancer Screening - Mammogram  Depression  -  PHQ-9 Needed    NEXT STEPS:   Patient has upcoming appointment, these items will be addressed at that time.    Type of outreach:    Chart review performed, no outreach needed.      Questions for provider review:         Jyothi Hicks MA

## 2022-03-23 ENCOUNTER — OFFICE VISIT (OUTPATIENT)
Dept: NEUROLOGY | Facility: CLINIC | Age: 77
End: 2022-03-23
Attending: FAMILY MEDICINE
Payer: COMMERCIAL

## 2022-03-23 ENCOUNTER — TELEPHONE (OUTPATIENT)
Dept: NEUROLOGY | Facility: CLINIC | Age: 77
End: 2022-03-23

## 2022-03-23 VITALS
WEIGHT: 137 LBS | HEIGHT: 63 IN | HEART RATE: 68 BPM | BODY MASS INDEX: 24.27 KG/M2 | SYSTOLIC BLOOD PRESSURE: 125 MMHG | DIASTOLIC BLOOD PRESSURE: 80 MMHG

## 2022-03-23 DIAGNOSIS — R41.9 NEUROCOGNITIVE DISORDER: Primary | ICD-10-CM

## 2022-03-23 DIAGNOSIS — T39.95XA ANALGESIC REBOUND HEADACHE: ICD-10-CM

## 2022-03-23 DIAGNOSIS — G44.40 ANALGESIC REBOUND HEADACHE: ICD-10-CM

## 2022-03-23 DIAGNOSIS — R41.3 MEMORY LOSS: ICD-10-CM

## 2022-03-23 PROCEDURE — 99205 OFFICE O/P NEW HI 60 MIN: CPT | Performed by: PSYCHIATRY & NEUROLOGY

## 2022-03-23 ASSESSMENT — MONTREAL COGNITIVE ASSESSMENT (MOCA)
11. FOR EACH PAIR OF WORDS, WHAT CATEGORY DO THEY BELONG TO (OUT OF 2): 2
4. NAME EACH OF THE THREE ANIMALS SHOWN: 3
9. REPEAT EACH SENTENCE: 1
WHAT LEVEL OF EDUCATION WAS ATTAINED: 0
WHAT IS THE TOTAL SCORE (OUT OF 30): 16
12. MEMORY INDEX SCORE: 0
8. SERIAL SUBTRACTION OF 7S: 1
6. READ LIST OF DIGITS [FORWARD/BACKWARD]: 2
10. [FLUENCY] NAME WORDS STARTING WITH DESIGNATED LETTER: 0
7. [VIGILENCE] TAP WHEN HEARING DESIGNATED LETTER: 1
VISUOSPATIAL/EXECUTIVE SUBSCORE: 2
13. ORIENTATION SUBSCORE: 4

## 2022-03-23 NOTE — PROGRESS NOTES
"NEUROLOGY CONSULTATION NOTE       [facility] [place]  [address] [place] [address]  Tel: [phone]  Fax: [phone]  [email or web address]     Irene Mullins,  1945, MRN [id number]  PCP: Lynda Bal  Date: 2022     ASSESSMENT & PLAN     Visit Diagnosis  1. Neurocognitive disorder  2. Chronic tension type headache  3. Analgesic rebound headache     Neurocognitive disorder; likely late onset Alzheimer's dementia  76-year-old female with history of chronic tension type headache, depression, IBS, right middle cranial fossa meningioma who was referred for evaluation of progressive cognitive decline.  Her symptoms strongly suggest the diagnosis of late onset Alzheimer's dementia.  She had MRI of the brain that showed incidental right middle cranial fossa meningioma.  She was started on Namenda sometime back and currently only takes 5 mg daily.  I have recommended:    1.  Lab work for common causes of cognitive decline to include folate, homocystine, methylmalonic acid level, RPR, vitamin B1 and alpha-tocopherol.  Previously she had vitamin B12 and TSH that was normal.  2.  Neuropsychological testing  3.  Follow-up after above test when I will consider increasing the dose of Namenda to the normal maintenance dose of 10 mg twice daily    Chronic tension type headache  Patient also suffers from chronic tension type headache and in the past was labeled as \"ice pick headaches\" she has been taking Tylenol almost on a daily basis and likely has developed analgesia rebound headaches.  I have told her to stop using over-the-counter pain medication and to use nonpharmacological measures to treat her headaches at least for the next 2 to 3 weeks.  She clearly has depression and anxiety and although she is on Celexa 40 mg daily appears depressed and might need addition of Wellbutrin.  I will defer that to her primary care physician.    Physiological tremors  Patient has " intermittent intention tremor and I suspect these are accentuation of physiological tremor due to anxiety.  At present I am holding off any intervention but down the road she might benefit from low-dose of Inderal.    Thank you again for this referral, please feel free to contact me if you have any questions.    eBnito Foss MD  [facility]  (Formerly, [facility]     REASON FOR CONSULTATION Memory Loss        HISTORY OF PRESENT ILLNESS     We have been requested by Dr. Bal to evaluate Irene Mullins who is a 76 year old  female for cognitive decline    Patient is 76-year-old female with history of chronic headache, depression, IBS, right middle cranial fossa meningioma who was referred for evaluation of cognitive decline.  Patient developed memory difficulty in the past and had lab work that included normal B12, TSH and was started on Namenda.  She does not recall seeing a neurologist for her cognitive issues.  Part of work-up for her cognitive issues and headache included a MRI of the brain in 2021 that showed right middle cranial fossa meningioma.      According to patient about 2 to 3 years ago she started noticing difficulty with short-term memory.  She used to be an  but had difficulty paying the bills at home.  Her  took over.  She did not notice any difficulty with remote memory.  She denies any change in her personality or any hallucinations.  They recently moved into an senior apartment and she is having difficulty finding her apartment at time.  She noticed some difficulty with her balance.  She was started on Namenda 5 mg daily and has been on it for some time.    Her other major issue is chronic headaches for which she was evaluated in the past.  She describes it almost daily headaches during which she gets a sharp shooting pain in different part of the brain.  She was diagnosed with icepick headaches and admits she has been  taking Tylenol almost on a daily basis in the morning and evening for long time.  She clearly suffers from depression and anxiety which likely is the underlying cause for her chronic tension type headache resulting in ice pick headaches and now likely have evolved into analgesic rebound headache as she is taking Tylenol almost on a regular basis     PROBLEM LIST   Patient Active Problem List   Diagnosis Code     Calcific tendinitis of shoulder M75.30     Chronic tension-type headache, intractable G44.221     History of basal cell carcinoma Z85.828     Insomnia G47.00     Irritable bowel syndrome K58.9     Right middle cranial fossa 2.2 x 1.9 x 1.8 cm meningioma D32.0     Osteopenia M85.80     Seborrheic keratosis L82.1     Presbyesophagus K22.89     Depression with anxiety F41.8     Analgesic rebound headache G44.40, T39.95XA         PAST MEDICAL & SURGICAL HISTORY     Past Medical History:   Patient  has no past medical history on file.    Surgical History:  She  has no past surgical history on file.     SOCIAL HISTORY     Reviewed, and she  reports that she has quit smoking. She has never used smokeless tobacco. She reports previous alcohol use.     FAMILY HISTORY     Reviewed, and family history includes Coronary Artery Disease in her sister; Diabetes in her sister; Hyperlipidemia in her brother and sister; Hypertension in her mother; Myocardial Infarction in her mother.     ALLERGIES     Allergies   Allergen Reactions     Oxycodone-Acetaminophen      Other reaction(s): Dizziness, GI Upset     Sulfamethoxazole-Trimethoprim      Other reaction(s): Other - Describe In Comment Field  Hot flashes, face and arms red for 1 hour after taking medication     Tramadol Nausea and Vomiting     Valproic Acid      Other reaction(s): Other - Describe In Comment Field  Hair loss, elevated liver enzymes         REVIEW OF SYSTEMS     A 12 point review of system was performed and was negative except as outlined in the history of  "present illness.     HOME MEDICATIONS     Current Outpatient Rx   Medication Sig Dispense Refill     Acetaminophen (TYLENOL PO)        calcium carbonate-vitamin D (LIQUID CALCIUM/VITAMIN D) 600-200 MG-UNIT CAPS Take 1 tablet by mouth daily       citalopram (CELEXA) 40 MG tablet Take 1 tablet (40 mg) by mouth daily 90 tablet 3     Cranberry-Vitamin C (AZO CRANBERRY URINARY TRACT PO) Take by mouth daily       melatonin 5 MG tablet Take 5 mg by mouth nightly as needed for sleep       memantine (NAMENDA) 5 MG tablet Take 1 tablet (5 mg) by mouth daily 90 tablet 1     Omega-3 Fatty Acids (FISH OIL PO) Take 1,200 mg by mouth daily       zinc 50 MG TABS            PHYSICAL EXAM     Vital signs  /80 (BP Location: Right arm, Patient Position: Sitting)   Pulse 68   Ht 1.6 m (5' 3\")   Wt 62.1 kg (137 lb)   BMI 24.27 kg/m      Weight:   137 lbs 0 oz  MOCA 3/23/2022   Visuospatial/Executive  2   Naming 3   Attention - Digits 2   Attention - Letters 1   Attention - Subtraction 1   Language - Repeat 1   Language - Fluency  0   Abstraction 2   Delayed Recall 0   Orientation 4   Education 0   MOCA Score 16   Administered by:  Celeste Porter CMA     Patient is alert and oriented appears depressed.  Vital signs were reviewed and are documented in electronic medical record.  Neck supple.  Neurologically speech was normal.  Cranial nerves II through XII are intact.  Motor strength 5/5 reflexes 2+ toes downgoing.  She has dysmetria on finger-nose testing.  Intact sensation.  She has a wide-based gait     PERTINENT DIAGNOSTIC STUDIES     Following studies were reviewed:     MRI BRAIN 11/23/2021  1. 2.2 x 1.9 x 1.8 cm extra-axial nodule along the floor of the right  middle cranial fossa consistent with the meningioma described in the  patient's history.  2. Diffuse cerebral volume loss and cerebral white matter changes  consistent with chronic small vessel ischemic disease     PERTINENT LABS  Following labs were reviewed:  No " visits with results within 3 Month(s) from this visit.   Latest known visit with results is:   Office Visit on 08/06/2021   Component Date Value     TSH 08/06/2021 0.97      Sodium 08/06/2021 135      Potassium 08/06/2021 4.0      Chloride 08/06/2021 101      Carbon Dioxide (CO2) 08/06/2021 28      Anion Gap 08/06/2021 6      Urea Nitrogen 08/06/2021 12      Creatinine 08/06/2021 0.78      Calcium 08/06/2021 9.3      Glucose 08/06/2021 85      GFR Estimate 08/06/2021 75      Vitamin B12 08/06/2021 506      Erythrocyte Sedimentatio* 08/06/2021 10      CRP Inflammation 08/06/2021 <2.9      WBC Count 08/06/2021 6.2      RBC Count 08/06/2021 4.20      Hemoglobin 08/06/2021 12.6      Hematocrit 08/06/2021 39.1      MCV 08/06/2021 93      MCH 08/06/2021 30.0      MCHC 08/06/2021 32.2      RDW 08/06/2021 13.5      Platelet Count 08/06/2021 264      % Neutrophils 08/06/2021 48      % Lymphocytes 08/06/2021 42      % Monocytes 08/06/2021 8      % Eosinophils 08/06/2021 2      % Basophils 08/06/2021 1      Absolute Neutrophils 08/06/2021 3.0      Absolute Lymphocytes 08/06/2021 2.6      Absolute Monocytes 08/06/2021 0.5      Absolute Eosinophils 08/06/2021 0.1      Absolute Basophils 08/06/2021 0.0         Total time spent for face to face visit, reviewing labs/imaging studies, counseling and coordination of care was: 1 Hour spent on the date of the encounter doing chart review, review of outside records, review of test results, interpretation of tests, patient visit, documentation and discussion with family       This note was dictated using voice recognition software.  Any grammatical or context distortions are unintentional and inherent to the software.    Orders Placed This Encounter   Procedures     Folate     Homocysteine     Methylmalonic Acid     RPR with Rflx to Titer and Confirm ([facility])     Vitamin B1 (Thiamine)  Plasma ([facility])     Vitamin E     Neuropsychology Referral      New Prescriptions    No medications on  file      Modified Medications    No medications on file

## 2022-03-23 NOTE — NURSING NOTE
Chief Complaint   Patient presents with     Memory Loss     Memory loss x 2 years MOCA 16/30     Celeste Porter CMA on 3/23/2022 at 11:24 AM

## 2022-03-23 NOTE — TELEPHONE ENCOUNTER
Patient Neuropsych testing is scheduled on 11/16 . She asked if the medication change for the memantine (NAMENDA) 5 MG tablet. Should wait till testing ( concern is that the testing is scheduled out so far or should the dosage be changed before the testing.      Please advise patient     Thank you

## 2022-03-23 NOTE — LETTER
"    3/23/2022         RE: Irene Mullins  [address]  [facility] [address]        Dear Colleague,    Thank you for referring your patient, Irene Mullins, to the [facility] [place]. Please see a copy of my visit note below.    NEUROLOGY CONSULTATION NOTE       [facility] [place]  [address] [place] [address]  Tel: [phone]  Fax: [phone]  [email or web address]     Irene Mullins,  1945, MRN [id number]  PCP: Lynda Bal  Date: 2022     ASSESSMENT & PLAN     Visit Diagnosis  1. Neurocognitive disorder  2. Chronic tension type headache  3. Analgesic rebound headache     Neurocognitive disorder; likely late onset Alzheimer's dementia  76-year-old female with history of chronic tension type headache, depression, IBS, right middle cranial fossa meningioma who was referred for evaluation of progressive cognitive decline.  Her symptoms strongly suggest the diagnosis of late onset Alzheimer's dementia.  She had MRI of the brain that showed incidental right middle cranial fossa meningioma.  She was started on Namenda sometime back and currently only takes 5 mg daily.  I have recommended:    1.  Lab work for common causes of cognitive decline to include folate, homocystine, methylmalonic acid level, RPR, vitamin B1 and alpha-tocopherol.  Previously she had vitamin B12 and TSH that was normal.  2.  Neuropsychological testing  3.  Follow-up after above test when I will consider increasing the dose of Namenda to the normal maintenance dose of 10 mg twice daily    Chronic tension type headache  Patient also suffers from chronic tension type headache and in the past was labeled as \"ice pick headaches\" she has been taking Tylenol almost on a daily basis and likely has developed analgesia rebound headaches.  I have told her to stop using over-the-counter pain medication and to use nonpharmacological measures to treat her headaches at least " for the next 2 to 3 weeks.  She clearly has depression and anxiety and although she is on Celexa 40 mg daily appears depressed and might need addition of Wellbutrin.  I will defer that to her primary care physician.    Physiological tremors  Patient has intermittent intention tremor and I suspect these are accentuation of physiological tremor due to anxiety.  At present I am holding off any intervention but down the road she might benefit from low-dose of Inderal.    Thank you again for this referral, please feel free to contact me if you have any questions.    Benito Foss MD  [facility]  (Formerly, [facility]     REASON FOR CONSULTATION Memory Loss        HISTORY OF PRESENT ILLNESS     We have been requested by Dr. Bal to evaluate Irene Mullins who is a 76 year old  female for cognitive decline    Patient is 76-year-old female with history of chronic headache, depression, IBS, right middle cranial fossa meningioma who was referred for evaluation of cognitive decline.  Patient developed memory difficulty in the past and had lab work that included normal B12, TSH and was started on Namenda.  She does not recall seeing a neurologist for her cognitive issues.  Part of work-up for her cognitive issues and headache included a MRI of the brain in 2021 that showed right middle cranial fossa meningioma.      According to patient about 2 to 3 years ago she started noticing difficulty with short-term memory.  She used to be an  but had difficulty paying the bills at home.  Her  took over.  She did not notice any difficulty with remote memory.  She denies any change in her personality or any hallucinations.  They recently moved into an senior apartment and she is having difficulty finding her apartment at time.  She noticed some difficulty with her balance.  She was started on Namenda 5 mg daily and has been on it for some time.    Her other  major issue is chronic headaches for which she was evaluated in the past.  She describes it almost daily headaches during which she gets a sharp shooting pain in different part of the brain.  She was diagnosed with icepick headaches and admits she has been taking Tylenol almost on a daily basis in the morning and evening for long time.  She clearly suffers from depression and anxiety which likely is the underlying cause for her chronic tension type headache resulting in ice pick headaches and now likely have evolved into analgesic rebound headache as she is taking Tylenol almost on a regular basis     PROBLEM LIST   Patient Active Problem List   Diagnosis Code     Calcific tendinitis of shoulder M75.30     Chronic tension-type headache, intractable G44.221     History of basal cell carcinoma Z85.828     Insomnia G47.00     Irritable bowel syndrome K58.9     Right middle cranial fossa 2.2 x 1.9 x 1.8 cm meningioma D32.0     Osteopenia M85.80     Seborrheic keratosis L82.1     Presbyesophagus K22.89     Depression with anxiety F41.8     Analgesic rebound headache G44.40, T39.95XA         PAST MEDICAL & SURGICAL HISTORY     Past Medical History:   Patient  has no past medical history on file.    Surgical History:  She  has no past surgical history on file.     SOCIAL HISTORY     Reviewed, and she  reports that she has quit smoking. She has never used smokeless tobacco. She reports previous alcohol use.     FAMILY HISTORY     Reviewed, and family history includes Coronary Artery Disease in her sister; Diabetes in her sister; Hyperlipidemia in her brother and sister; Hypertension in her mother; Myocardial Infarction in her mother.     ALLERGIES     Allergies   Allergen Reactions     Oxycodone-Acetaminophen      Other reaction(s): Dizziness, GI Upset     Sulfamethoxazole-Trimethoprim      Other reaction(s): Other - Describe In Comment Field  Hot flashes, face and arms red for 1 hour after taking medication     Tramadol  "Nausea and Vomiting     Valproic Acid      Other reaction(s): Other - Describe In Comment Field  Hair loss, elevated liver enzymes         REVIEW OF SYSTEMS     A 12 point review of system was performed and was negative except as outlined in the history of present illness.     HOME MEDICATIONS     Current Outpatient Rx   Medication Sig Dispense Refill     Acetaminophen (TYLENOL PO)        calcium carbonate-vitamin D (LIQUID CALCIUM/VITAMIN D) 600-200 MG-UNIT CAPS Take 1 tablet by mouth daily       citalopram (CELEXA) 40 MG tablet Take 1 tablet (40 mg) by mouth daily 90 tablet 3     Cranberry-Vitamin C (AZO CRANBERRY URINARY TRACT PO) Take by mouth daily       melatonin 5 MG tablet Take 5 mg by mouth nightly as needed for sleep       memantine (NAMENDA) 5 MG tablet Take 1 tablet (5 mg) by mouth daily 90 tablet 1     Omega-3 Fatty Acids (FISH OIL PO) Take 1,200 mg by mouth daily       zinc 50 MG TABS            PHYSICAL EXAM     Vital signs  /80 (BP Location: Right arm, Patient Position: Sitting)   Pulse 68   Ht 1.6 m (5' 3\")   Wt 62.1 kg (137 lb)   BMI 24.27 kg/m      Weight:   137 lbs 0 oz  MOCA 3/23/2022   Visuospatial/Executive  2   Naming 3   Attention - Digits 2   Attention - Letters 1   Attention - Subtraction 1   Language - Repeat 1   Language - Fluency  0   Abstraction 2   Delayed Recall 0   Orientation 4   Education 0   MOCA Score 16   Administered by:  Celeste Porter CMA     Patient is alert and oriented appears depressed.  Vital signs were reviewed and are documented in electronic medical record.  Neck supple.  Neurologically speech was normal.  Cranial nerves II through XII are intact.  Motor strength 5/5 reflexes 2+ toes downgoing.  She has dysmetria on finger-nose testing.  Intact sensation.  She has a wide-based gait     PERTINENT DIAGNOSTIC STUDIES     Following studies were reviewed:     MRI BRAIN 11/23/2021  1. 2.2 x 1.9 x 1.8 cm extra-axial nodule along the floor of the right  middle " cranial fossa consistent with the meningioma described in the  patient's history.  2. Diffuse cerebral volume loss and cerebral white matter changes  consistent with chronic small vessel ischemic disease     PERTINENT LABS  Following labs were reviewed:  No visits with results within 3 Month(s) from this visit.   Latest known visit with results is:   Office Visit on 08/06/2021   Component Date Value     TSH 08/06/2021 0.97      Sodium 08/06/2021 135      Potassium 08/06/2021 4.0      Chloride 08/06/2021 101      Carbon Dioxide (CO2) 08/06/2021 28      Anion Gap 08/06/2021 6      Urea Nitrogen 08/06/2021 12      Creatinine 08/06/2021 0.78      Calcium 08/06/2021 9.3      Glucose 08/06/2021 85      GFR Estimate 08/06/2021 75      Vitamin B12 08/06/2021 506      Erythrocyte Sedimentatio* 08/06/2021 10      CRP Inflammation 08/06/2021 <2.9      WBC Count 08/06/2021 6.2      RBC Count 08/06/2021 4.20      Hemoglobin 08/06/2021 12.6      Hematocrit 08/06/2021 39.1      MCV 08/06/2021 93      MCH 08/06/2021 30.0      MCHC 08/06/2021 32.2      RDW 08/06/2021 13.5      Platelet Count 08/06/2021 264      % Neutrophils 08/06/2021 48      % Lymphocytes 08/06/2021 42      % Monocytes 08/06/2021 8      % Eosinophils 08/06/2021 2      % Basophils 08/06/2021 1      Absolute Neutrophils 08/06/2021 3.0      Absolute Lymphocytes 08/06/2021 2.6      Absolute Monocytes 08/06/2021 0.5      Absolute Eosinophils 08/06/2021 0.1      Absolute Basophils 08/06/2021 0.0         Total time spent for face to face visit, reviewing labs/imaging studies, counseling and coordination of care was: 1 Hour spent on the date of the encounter doing chart review, review of outside records, review of test results, interpretation of tests, patient visit, documentation and discussion with family       This note was dictated using voice recognition software.  Any grammatical or context distortions are unintentional and inherent to the software.    Orders Placed  This Encounter   Procedures     Folate     Homocysteine     Methylmalonic Acid     RPR with Rflx to Titer and Confirm ([facility])     Vitamin B1 (Thiamine)  Plasma ([facility])     Vitamin E     Neuropsychology Referral      New Prescriptions    No medications on file      Modified Medications    No medications on file              Again, thank you for allowing me to participate in the care of your patient.        Sincerely,        Benito Foss MD

## 2022-03-23 NOTE — NURSING NOTE
GARIMA COGNITIVE ASSESSMENT (MOCA)  Version 7.1 Original Version  VISUOSPATIAL/EXECUTIVE               COPY CUBE      [  1  ]                                [  0  ] DRAW CLOCK (Ten past eleven)  (3 points)    [ 1   ]                    [  0  ]               [ 0   ]       Contour            Numbers     Hands POINTS              2     / 5   NAMING    [ 1  ]                                                                        [  1  ]                                             [ 1   ]  Liphilip Mckenzie                                Camel                  3   / 3   MEMORY Read list of words, subject must repeat them. Do 2 trials, even if 1st trial is successful. Do a recall after 5 minutes  FACE VELVET Jewish DREW RED No Points    1st   x x x     2nd x x x      ATTENTION Read list of digits (1 digit/sec) Subject has to repeat in the forward order       [  1  ]   2  1  8  5  4                                [  1  ] 7 4 2                      2    /2   Read list of letters. The subject must tap with his hand at each letter A. No points if > 2 errors.  [1    ] F B A C M N A A J K L B A F A K D E A A A J A M O F A A B          1    /1   Serial 7 subtraction starting at 100          [ 1   ] 93         [    ] 86          [    ] 79          [    ] 72         [    ] 65   4 or 5 correct subtractions: 3 points,  2 or 3 correct: 2 points,  1correct: 1 point,   0 correct: 0 points         1   /3   LANGUAGE Repeat: I only know that Dale is the one to help today. [   0  ]                                      The cat always hid under the couch when dogs were in the room. [ 1  ]           1    /2   Fluency: Name maximum number of words in one minute that begin with the letter F                                                                                                                    [0    ] _7__ (N > 11 words)           0    /1   ABSTRACTION Similarity  between e.g. banana-orange=fruit                                                                   [1    ] train-bicycle                      [   1 ] watch-ruler           2   /2   DELAYED  RECALL Has to recall words  WITH NO CUE FACE  [  0  ] VELVET  [  0  ] Mormon  [ 0   ]  DREW  [ 0   ] RED  [ 0 ] Points for UNCUED recall only         0   /5           OPTIONAL Category cue           Multiple choice cue          ORIENTATION  [  1  ] Date     [ 1   ] Month       [ 1   ] Year      [ 1   ] Day      [ 0   ] Place        [ 0   ] City     4 /6   TOTAL  Normal > 26/30 Add 1 point if < 12 years education  16 /30

## 2022-03-24 RX ORDER — MEMANTINE HYDROCHLORIDE 5 MG/1
TABLET ORAL
Qty: 70 TABLET | Refills: 0 | Status: SHIPPED | OUTPATIENT
Start: 2022-03-24 | End: 2022-04-24

## 2022-03-24 RX ORDER — MEMANTINE HYDROCHLORIDE 10 MG/1
10 TABLET ORAL 2 TIMES DAILY
Qty: 30 TABLET | Refills: 11 | Status: SHIPPED | OUTPATIENT
Start: 2022-04-25 | End: 2022-09-27

## 2022-03-24 NOTE — TELEPHONE ENCOUNTER
I have sent 2 prescriptions to patient's pharmacy.  First 1 is for Namenda 5 mg daily ramping it up to 10 mg twice daily in 1 month.  Second prescription effective 4/24/2022 is for maintenance dose of Namenda 10 mg twice daily.  Follow-up after work-up is complete

## 2022-03-24 NOTE — TELEPHONE ENCOUNTER
Relayed new dosing on to pt and she verbalized understanding.     Marry Booker RN on 3/24/2022 at 12:07 PM

## 2022-04-01 ENCOUNTER — LAB (OUTPATIENT)
Dept: LAB | Facility: CLINIC | Age: 77
End: 2022-04-01
Payer: COMMERCIAL

## 2022-04-01 DIAGNOSIS — R41.3 MEMORY LOSS: ICD-10-CM

## 2022-04-01 DIAGNOSIS — R41.9 NEUROCOGNITIVE DISORDER: ICD-10-CM

## 2022-04-01 LAB — FOLATE SERPL-MCNC: 12.3 NG/ML

## 2022-04-01 PROCEDURE — 86780 TREPONEMA PALLIDUM: CPT

## 2022-04-01 PROCEDURE — 83921 ORGANIC ACID SINGLE QUANT: CPT

## 2022-04-01 PROCEDURE — 83090 ASSAY OF HOMOCYSTEINE: CPT

## 2022-04-01 PROCEDURE — 82746 ASSAY OF FOLIC ACID SERUM: CPT

## 2022-04-01 PROCEDURE — 84446 ASSAY OF VITAMIN E: CPT | Mod: 90

## 2022-04-01 PROCEDURE — 36415 COLL VENOUS BLD VENIPUNCTURE: CPT

## 2022-04-01 PROCEDURE — 99000 SPECIMEN HANDLING OFFICE-LAB: CPT

## 2022-04-02 LAB — T PALLIDUM AB SER QL: NONREACTIVE

## 2022-04-04 LAB
A-TOCOPHEROL VIT E SERPL-MCNC: 12.3 MG/L
BETA+GAMMA TOCOPHEROL SERPL-MCNC: 0.6 MG/L

## 2022-04-06 LAB
HCYS SERPL-SCNC: 10 UMOL/L (ref 4–12)
METHYLMALONATE SERPL-SCNC: 0.27 UMOL/L (ref 0–0.4)

## 2022-04-13 ENCOUNTER — TELEPHONE (OUTPATIENT)
Dept: NEUROLOGY | Facility: CLINIC | Age: 77
End: 2022-04-13
Payer: COMMERCIAL

## 2022-04-13 NOTE — TELEPHONE ENCOUNTER
Spoke with pt and relayed that results were normal, however, Vitamin B1 was not drawn so she will need to get this completed. Pt verbalized understanding.     Marry Booker RN on 4/13/2022 at 10:43 AM

## 2022-04-13 NOTE — TELEPHONE ENCOUNTER
[facility] Call Center    Phone Message    May a detailed message be left on voicemail: yes     Reason for Call: Other: Pt says she had some lab work done on 04/01. She says she would like to know the results of those labs. No one has called her about them or sent the results in the mail. Pt would like Dr. Foss or his nurse to give her a call to discuss them.  Ph: [phone]    Action Taken: Message routed to:  Other: [facility]    Travel Screening: Not Applicable

## 2022-04-18 ENCOUNTER — LAB (OUTPATIENT)
Dept: LAB | Facility: CLINIC | Age: 77
End: 2022-04-18
Payer: COMMERCIAL

## 2022-04-18 DIAGNOSIS — R41.3 MEMORY LOSS: Primary | ICD-10-CM

## 2022-04-18 DIAGNOSIS — R41.9 NEUROCOGNITIVE DISORDER: ICD-10-CM

## 2022-04-18 DIAGNOSIS — R41.3 MEMORY LOSS: ICD-10-CM

## 2022-04-18 PROCEDURE — 99000 SPECIMEN HANDLING OFFICE-LAB: CPT

## 2022-04-18 PROCEDURE — 36415 COLL VENOUS BLD VENIPUNCTURE: CPT

## 2022-04-18 PROCEDURE — 84425 ASSAY OF VITAMIN B-1: CPT | Mod: 90

## 2022-04-19 DIAGNOSIS — R41.9 NEUROCOGNITIVE DISORDER: ICD-10-CM

## 2022-04-19 RX ORDER — MEMANTINE HYDROCHLORIDE 5 MG/1
TABLET ORAL
Qty: 70 TABLET | Refills: 0 | OUTPATIENT
Start: 2022-04-19

## 2022-04-19 NOTE — TELEPHONE ENCOUNTER
Refill request for Namenda. Pt last seen 3/23/22 and has follow up scheduled for 11/30/22. Pt was tapered up starting on 3/24/22 and has a refill of 10mg scheduled to be delivered on 4/25/22. Will deny as refill will be coming.     Marry Booker RN on 4/19/2022 at 1:47 PM

## 2022-04-25 LAB — VIT B1 PYROPHOSHATE BLD-SCNC: 104 NMOL/L

## 2022-06-09 ENCOUNTER — OFFICE VISIT (OUTPATIENT)
Dept: FAMILY MEDICINE | Facility: CLINIC | Age: 77
End: 2022-06-09
Payer: COMMERCIAL

## 2022-06-09 VITALS
HEART RATE: 70 BPM | SYSTOLIC BLOOD PRESSURE: 134 MMHG | HEIGHT: 63 IN | DIASTOLIC BLOOD PRESSURE: 72 MMHG | TEMPERATURE: 97.8 F | RESPIRATION RATE: 14 BRPM | BODY MASS INDEX: 24.27 KG/M2 | OXYGEN SATURATION: 98 %

## 2022-06-09 DIAGNOSIS — D32.0 BENIGN MENINGIOMA OF BRAIN (H): ICD-10-CM

## 2022-06-09 DIAGNOSIS — R26.89 POOR BALANCE: ICD-10-CM

## 2022-06-09 DIAGNOSIS — F41.9 ANXIETY: ICD-10-CM

## 2022-06-09 DIAGNOSIS — R41.3 MEMORY LOSS: ICD-10-CM

## 2022-06-09 DIAGNOSIS — R41.9 NEUROCOGNITIVE DISORDER: Primary | ICD-10-CM

## 2022-06-09 DIAGNOSIS — D69.2 SENILE PURPURA (H): ICD-10-CM

## 2022-06-09 PROCEDURE — 99214 OFFICE O/P EST MOD 30 MIN: CPT | Performed by: FAMILY MEDICINE

## 2022-06-09 RX ORDER — CITALOPRAM HYDROBROMIDE 40 MG/1
40 TABLET ORAL DAILY
Qty: 90 TABLET | Refills: 3 | Status: SHIPPED | OUTPATIENT
Start: 2022-06-09 | End: 2023-04-13

## 2022-06-09 ASSESSMENT — PAIN SCALES - GENERAL: PAINLEVEL: NO PAIN (0)

## 2022-06-09 ASSESSMENT — PATIENT HEALTH QUESTIONNAIRE - PHQ9: SUM OF ALL RESPONSES TO PHQ QUESTIONS 1-9: 0

## 2022-06-09 NOTE — PATIENT INSTRUCTIONS
Health Maintenance reviewed and plan for update discussed.  Patient asked to schedule her mammogram  Patient asked to schedule colonoscopy        Our Clinic hours are:  Mondays    7:20 am - 7 pm  Tues -  Fri  7:20 am - 5 pm    Clinic Phone: [phone]    The clinic lab opens at 7:30 am Mon - Fri and appointments are required.    [facility]  Ph. [phone]  Monday  8 am - 7pm  Tues - Fri 8 am - 5:30 pm

## 2022-06-09 NOTE — PROGRESS NOTES
"  Assessment & Plan     Neurocognitive disorder  Has neuropsych in November     Memory loss   on namenda    Poor balance  Refused PT    Anxiety  Continue this  - citalopram (CELEXA) 40 MG tablet; Take 1 tablet (40 mg) by mouth daily    Senile purpura (H)  Known issue that I take into account for their medical decisions, no current exacerbations or new concerns      Benign meningioma of brain (H)  Stable/benign.                   No follow-ups on file.    Lynda Bal MD  [facility]   Irene is a 77 year old who presents for the following health issues  accompanied by her spouse.    HPI     Dizziness  Onset/Duration: 1 year  Description:   Do you feel faint: no  Does it feel like the surroundings (bed, room) are moving: no  Unsteady/off balance: YES  Have you passed out or fallen: no  Intensity: moderate  Progression of Symptoms: same  Accompanying Signs & Symptoms:  Heart palpitations or chest pain: no  Nausea, vomiting: YES- nausea at times  Weakness or lack of coordination in arms or legs: no  Vision or speech changes: no  Numbness or tingling: no  Ringing in ears (Tinnitus): YES  Hearing Loss: no  History:   Head trauma/concussion history: no  Previous similar symptoms: no  Recent bleeding history: no  Any new medications (BP?): YES- namenda was increased to 10 mg bid  Precipitating factors:   Worse with activity: YES- when walking  Worse with head movement: YES- at times  Alleviating factors:   Does staying in a fixed position give relief: n/a only happens when she is up walking  Therapies tried and outcome: None'        Longstanding issue.  Has done PT in the past for dizziness/balance.  Doesn't want to do that again. We talked about walkers and canes, she's not sure she needs that right now.    Has seen Neurologist, can't do neuropsych until November (is on a wait list).  Increase of Namenda has \"helped\" and decreased her headaches.         Review of Systems " "  Constitutional, HEENT, cardiovascular, pulmonary, gi and gu systems are negative, except as otherwise noted.      Objective    /72   Pulse 70   Temp 97.8  F (36.6  C) (Tympanic)   Resp 14   Ht 1.6 m (5' 3\")   SpO2 98%   Breastfeeding No   BMI 24.27 kg/m    Body mass index is 24.27 kg/m .  Physical Exam   GENERAL APPEARANCE: healthy, alert and no distress  PSYCH:   MENTAL STATUS EXAM:  Appearance/Behavior: No apparent distress, Neatly groomed and Appears stated age  Speech: Normal  Mood/Affect: normal affect  Insight: Fair  Somewhat forgetful, but overall speech is fluid. Some word finding difficulties at times.                 "

## 2022-06-22 NOTE — PROGRESS NOTES
Patient Quality Outreach    Patient is due for the following:   Colon Cancer Screening -  FIT  Breast Cancer Screening - Mammogram    NEXT STEPS:   Schedule a office visit for colon and breast cancer screenings.    Type of outreach:    Sent letter.      Questions for provider review:    None     MAEGAN Llanos LPN

## 2022-06-22 NOTE — LETTER
[facility]  [address]  [place] [address]  [phone]       June 22, 2022    Irene Mullins  [address]   [facility] [address]        Dear Irene,    We care about your health and have reviewed your health plan and are making recommendations based on this review, to optimize your health.    You are in particular need of attention regarding:  -Breast Cancer Screening  -Colon Cancer Screening    We are recommending that you:                                                                                                                                       -schedule a MAMMOGRAM which is due.         1 in 8 women will develop invasive breast cancer during her lifetime and it is the most common non-skin cancer in American women.  EARLY detection, new treatments, and a better understanding of the disease have increased survival rates - the 5 year survival rate in the 1960s was 63% and today it is close to 90%.          If you are under/uninsured, we recommend you contact the [facility]. They offer mammograms at no charge or on a sliding fee charge. You can schedule with them at [phone]. Please have them to send us the results.           Please disregard this reminder if you have had this exam elsewhere within the last year.  It would be helpful for us to have a copy of your mammogram report in our file so that we can best coordinate your care - please contact us with when your test was done so we can update your record.                                                                                                                      -schedule a COLONOSCOPY to look for colon cancer (due every 10 years or 5 years in higher risk situations.)        Colon cancer is now the second leading cause of cancer-related deaths in the [place] for both men and women and there are over 130,000 new cases and 50,000 deaths per year from colon cancer.  Colonoscopies can  prevent 90-95% of these deaths.  Problem lesions can be removed before they ever become cancer.  This test is not only looking for cancer, but also getting rid of precancerious lesions.      If you are under/uninsured, we recommend you contact the [facility] program. [employer] is a free colorectal cancer screening program that provides colonoscopies for eligible under/uninsured [place] men and women. If you are interested in receiving a free colonoscopy, please call [facility] at [phone] (mention code [employer]) to see if you re eligible.     If you do not wish to do a colonoscopy or cannot afford to do one, at this time, there is another option. It is called a FIT test or Fecal Immunochemical Occult Blood Test (take home stool sample kit).  It does not replace the colonoscopy for colorectal cancer screening, but it can detect hidden bleeding in the lower colon.  It does need to be repeated every year and if a positive result is obtained, you would be referred for a colonoscopy.       If you have completed either one of these tests at another facility, please call with the details of when and where the tests were done and if they were normal or not. Or have the records sent to our clinic so that we can best coordinate your care.    In addition, here is a list of due or overdue Health Maintenance reminders.    Health Maintenance Due   Topic Date Due     Osteoporosis Screening  Never done     ANNUAL REVIEW OF HM ORDERS  Never done     Mammogram  Never done     Colorectal Cancer Screening  Never done     Hepatitis C Screening  Never done     Cholesterol Lab  Never done     LUNG CANCER SCREENING  Never done     COVID-19 Vaccine (4 - Booster for Pfizer series) 02/08/2022       To address the above recommendations, we encourage you to contact us at [phone], via [employer] or by contacting Central Scheduling toll free at [phone] 24 hours a day. They will assist you with finding the most convenient  time and location.    Thank you for trusting [facility] and we appreciate the opportunity to serve you.  We look forward to supporting your healthcare needs in the future.    Healthy Regards,    Your [facility] Team

## 2022-07-25 ENCOUNTER — TELEPHONE (OUTPATIENT)
Dept: FAMILY MEDICINE | Facility: CLINIC | Age: 77
End: 2022-07-25

## 2022-07-25 DIAGNOSIS — K59.00 CONSTIPATION, UNSPECIFIED CONSTIPATION TYPE: Primary | ICD-10-CM

## 2022-07-25 NOTE — TELEPHONE ENCOUNTER
Reason for call:  Patient reporting a symptom    Symptom or request: Pt has had constipation on and off for a while now.  Pt has tried OTC meds and they are not working.  Pt wants a colonoscopy ordered without seeing MD in clinic.  Please call patient and advise.      Duration (how long have symptoms been present): ongoing    Have you been treated for this before? Yes    Additional comments:     Phone Number patient can be reached at:  Home number on file [phone] (home)    Best Time:  any    Can we leave a detailed message on this number:  YES    Call taken on 7/25/2022 at 1:05 PM by Jyothi So

## 2022-07-25 NOTE — TELEPHONE ENCOUNTER
"Provider Jane replied with the following routing comment:    \"Please advise patient it would be a diagnostic not screening due to her age and symptoms.   If she is fine to go ahead, will place order.   (I am covering for Dr. Bal who is away from the office today.)   ROMEO Donovan \"    Writer called patient.  Patient stated understanding.  Patient had already called her insurance and the colonoscopy should be covered either way.    Routed to provider Jane:  Can patient have colonoscopy referral as pended?    Pedro Luis ROSEN [facility]        "

## 2022-07-27 ENCOUNTER — IMMUNIZATION (OUTPATIENT)
Dept: FAMILY MEDICINE | Facility: CLINIC | Age: 77
End: 2022-07-27
Payer: COMMERCIAL

## 2022-07-27 PROCEDURE — 91305 COVID-19,PF,PFIZER (12+ YRS): CPT

## 2022-07-27 PROCEDURE — 0054A COVID-19,PF,PFIZER (12+ YRS): CPT

## 2022-08-17 ENCOUNTER — ANESTHESIA EVENT (OUTPATIENT)
Dept: GASTROENTEROLOGY | Facility: CLINIC | Age: 77
End: 2022-08-17
Payer: COMMERCIAL

## 2022-08-17 RX ORDER — MEPERIDINE HYDROCHLORIDE 25 MG/ML
12.5 INJECTION INTRAMUSCULAR; INTRAVENOUS; SUBCUTANEOUS
Status: CANCELLED | OUTPATIENT
Start: 2022-08-17

## 2022-08-17 RX ORDER — ONDANSETRON 4 MG/1
4 TABLET, ORALLY DISINTEGRATING ORAL EVERY 30 MIN PRN
Status: CANCELLED | OUTPATIENT
Start: 2022-08-17

## 2022-08-17 RX ORDER — OXYCODONE HYDROCHLORIDE 5 MG/1
5 TABLET ORAL EVERY 4 HOURS PRN
Status: CANCELLED | OUTPATIENT
Start: 2022-08-17

## 2022-08-17 RX ORDER — ONDANSETRON 2 MG/ML
4 INJECTION INTRAMUSCULAR; INTRAVENOUS EVERY 30 MIN PRN
Status: CANCELLED | OUTPATIENT
Start: 2022-08-17

## 2022-08-17 RX ORDER — FENTANYL CITRATE 50 UG/ML
25 INJECTION, SOLUTION INTRAMUSCULAR; INTRAVENOUS
Status: CANCELLED | OUTPATIENT
Start: 2022-08-17

## 2022-08-17 RX ORDER — FENTANYL CITRATE 50 UG/ML
25 INJECTION, SOLUTION INTRAMUSCULAR; INTRAVENOUS EVERY 5 MIN PRN
Status: CANCELLED | OUTPATIENT
Start: 2022-08-17

## 2022-08-17 RX ORDER — HYDROMORPHONE HCL IN WATER/PF 6 MG/30 ML
0.2 PATIENT CONTROLLED ANALGESIA SYRINGE INTRAVENOUS EVERY 5 MIN PRN
Status: CANCELLED | OUTPATIENT
Start: 2022-08-17

## 2022-08-17 ASSESSMENT — LIFESTYLE VARIABLES: TOBACCO_USE: 1

## 2022-08-17 NOTE — ANESTHESIA PREPROCEDURE EVALUATION
Anesthesia Pre-Procedure Evaluation    Patient: Irene Mullins   MRN: [id number] : 1945        Procedure : Procedure(s):  COLONOSCOPY          No past medical history on file.   No past surgical history on file.   Allergies   Allergen Reactions     Oxycodone-Acetaminophen      Other reaction(s): Dizziness, GI Upset     Sulfamethoxazole-Trimethoprim      Other reaction(s): Other - Describe In Comment Field  Hot flashes, face and arms red for 1 hour after taking medication     Tramadol Nausea and Vomiting     Valproic Acid      Other reaction(s): Other - Describe In Comment Field  Hair loss, elevated liver enzymes      Social History     Tobacco Use     Smoking status: Former Smoker     Smokeless tobacco: Never Used   Substance Use Topics     Alcohol use: Not Currently     Alcohol/week: 0.0 standard drinks      Wt Readings from Last 1 Encounters:   22 62.1 kg (137 lb)        Anesthesia Evaluation   Pt has had prior anesthetic.         ROS/MED HX  ENT/Pulmonary:     (+) tobacco use, Past use,     Neurologic: Comment: Memory issues      Cardiovascular:  - neg cardiovascular ROS     METS/Exercise Tolerance: >4 METS    Hematologic:  - neg hematologic  ROS     Musculoskeletal:  - neg musculoskeletal ROS     GI/Hepatic: Comment: constipation      Renal/Genitourinary:  - neg Renal ROS     Endo:  - neg endo ROS     Psychiatric/Substance Use:     (+) psychiatric history anxiety and depression     Infectious Disease:       Malignancy:  - neg malignancy ROS     Other:            Physical Exam    Airway        Mallampati: III   TM distance: > 3 FB   Neck ROM: limited   Mouth opening: > 3 cm    Respiratory Devices and Support         Dental         B=Bridge, C=Chipped, L=Loose, M=Missing    Cardiovascular   cardiovascular exam normal          Pulmonary   pulmonary exam normal                OUTSIDE LABS:  CBC:   Lab Results   Component Value Date    WBC 6.2 2021    HGB 12.6 2021    HCT 39.1 2021      08/06/2021     BMP:   Lab Results   Component Value Date     08/06/2021    POTASSIUM 4.0 08/06/2021    CHLORIDE 101 08/06/2021    CO2 28 08/06/2021    BUN 12 08/06/2021    CR 0.78 08/06/2021    GLC 85 08/06/2021     COAGS: No results found for: PTT, INR, FIBR  POC: No results found for: BGM, HCG, HCGS  HEPATIC: No results found for: ALBUMIN, PROTTOTAL, ALT, AST, GGT, ALKPHOS, BILITOTAL, BILIDIRECT, BRIE  OTHER:   Lab Results   Component Value Date    CANDIDA 9.3 08/06/2021    TSH 0.97 08/06/2021    CRP <2.9 08/06/2021    SED 10 08/06/2021       Anesthesia Plan    ASA Status:  2   NPO Status:  NPO Appropriate    Anesthesia Type: General.     - Airway: Native airway              Consents    Anesthesia Plan(s) and associated risks, benefits, and realistic alternatives discussed. Questions answered and patient/representative(s) expressed understanding.    - Discussed:     - Discussed with:  Patient      - Extended Intubation/Ventilatory Support Discussed: No.      - Patient is DNR/DNI Status: No    Use of blood products discussed: No .     Postoperative Care    Pain management: Oral pain medications.   PONV prophylaxis: Ondansetron (or other 5HT-3), Background Propofol Infusion     Comments:                Lawrence Herring CRNA, APRN BRENDA

## 2022-08-25 ENCOUNTER — TELEPHONE (OUTPATIENT)
Dept: FAMILY MEDICINE | Facility: CLINIC | Age: 77
End: 2022-08-25

## 2022-08-25 NOTE — TELEPHONE ENCOUNTER
The patient called regarding the recall for the magnesium citrate recall. The patient is having a colonoscopy next week and was able to get all the medication except that.  What should she do?    Thank you    Zayra COLBERT RN

## 2022-08-25 NOTE — TELEPHONE ENCOUNTER
Called pt and advised her to skip the magnesium citrate portion of the bowel prep and to add a few extra glasses of water. Pt verbalized understanding.  Adele JAMES RN BSN PHN  Specialty Clinics

## 2022-08-29 ENCOUNTER — LAB (OUTPATIENT)
Dept: LAB | Facility: CLINIC | Age: 77
End: 2022-08-29
Payer: COMMERCIAL

## 2022-08-29 DIAGNOSIS — Z01.812 PRE-PROCEDURE LAB EXAM: Primary | ICD-10-CM

## 2022-08-29 LAB — SARS-COV-2 RNA RESP QL NAA+PROBE: NEGATIVE

## 2022-08-29 PROCEDURE — U0005 INFEC AGEN DETEC AMPLI PROBE: HCPCS

## 2022-08-29 PROCEDURE — U0003 INFECTIOUS AGENT DETECTION BY NUCLEIC ACID (DNA OR RNA); SEVERE ACUTE RESPIRATORY SYNDROME CORONAVIRUS 2 (SARS-COV-2) (CORONAVIRUS DISEASE [COVID-19]), AMPLIFIED PROBE TECHNIQUE, MAKING USE OF HIGH THROUGHPUT TECHNOLOGIES AS DESCRIBED BY CMS-2020-01-R: HCPCS

## 2022-08-31 ENCOUNTER — ANESTHESIA (OUTPATIENT)
Dept: GASTROENTEROLOGY | Facility: CLINIC | Age: 77
End: 2022-08-31
Payer: COMMERCIAL

## 2022-08-31 ENCOUNTER — HOSPITAL ENCOUNTER (OUTPATIENT)
Facility: CLINIC | Age: 77
Discharge: HOME | End: 2022-08-31
Attending: SURGERY | Admitting: SURGERY
Payer: COMMERCIAL

## 2022-08-31 VITALS
TEMPERATURE: 98.3 F | RESPIRATION RATE: 16 BRPM | HEIGHT: 63 IN | BODY MASS INDEX: 24.27 KG/M2 | DIASTOLIC BLOOD PRESSURE: 75 MMHG | OXYGEN SATURATION: 98 % | WEIGHT: 137 LBS | SYSTOLIC BLOOD PRESSURE: 117 MMHG | HEART RATE: 70 BPM

## 2022-08-31 DIAGNOSIS — K58.1 IRRITABLE BOWEL SYNDROME WITH CONSTIPATION: Primary | ICD-10-CM

## 2022-08-31 LAB — COLONOSCOPY: NORMAL

## 2022-08-31 PROCEDURE — 258N000003 HC RX IP 258 OP 636: Performed by: NURSE ANESTHETIST, CERTIFIED REGISTERED

## 2022-08-31 PROCEDURE — 250N000011 HC RX IP 250 OP 636: Performed by: NURSE ANESTHETIST, CERTIFIED REGISTERED

## 2022-08-31 PROCEDURE — 88305 TISSUE EXAM BY PATHOLOGIST: CPT | Mod: TC | Performed by: SURGERY

## 2022-08-31 PROCEDURE — G0500 MOD SEDAT ENDO SERVICE >5YRS: HCPCS | Performed by: SURGERY

## 2022-08-31 PROCEDURE — 45380 COLONOSCOPY AND BIOPSY: CPT | Performed by: SURGERY

## 2022-08-31 PROCEDURE — 45378 DIAGNOSTIC COLONOSCOPY: CPT | Performed by: SURGERY

## 2022-08-31 PROCEDURE — 250N000009 HC RX 250: Performed by: NURSE ANESTHETIST, CERTIFIED REGISTERED

## 2022-08-31 PROCEDURE — 250N000009 HC RX 250: Performed by: SURGERY

## 2022-08-31 PROCEDURE — 99153 MOD SED SAME PHYS/QHP EA: CPT

## 2022-08-31 PROCEDURE — 370N000017 HC ANESTHESIA TECHNICAL FEE, PER MIN: Performed by: SURGERY

## 2022-08-31 RX ORDER — NALOXONE HYDROCHLORIDE 0.4 MG/ML
0.2 INJECTION, SOLUTION INTRAMUSCULAR; INTRAVENOUS; SUBCUTANEOUS
Status: CANCELLED | OUTPATIENT
Start: 2022-08-31

## 2022-08-31 RX ORDER — NALOXONE HYDROCHLORIDE 0.4 MG/ML
0.4 INJECTION, SOLUTION INTRAMUSCULAR; INTRAVENOUS; SUBCUTANEOUS
Status: CANCELLED | OUTPATIENT
Start: 2022-08-31

## 2022-08-31 RX ORDER — SODIUM CHLORIDE, SODIUM LACTATE, POTASSIUM CHLORIDE, CALCIUM CHLORIDE 600; 310; 30; 20 MG/100ML; MG/100ML; MG/100ML; MG/100ML
INJECTION, SOLUTION INTRAVENOUS CONTINUOUS
Status: DISCONTINUED | OUTPATIENT
Start: 2022-08-31 | End: 2022-08-31 | Stop reason: HOSPADM

## 2022-08-31 RX ORDER — LIDOCAINE HYDROCHLORIDE 10 MG/ML
INJECTION, SOLUTION EPIDURAL; INFILTRATION; INTRACAUDAL; PERINEURAL PRN
Status: DISCONTINUED | OUTPATIENT
Start: 2022-08-31 | End: 2022-08-31

## 2022-08-31 RX ORDER — PROPOFOL 10 MG/ML
INJECTION, EMULSION INTRAVENOUS PRN
Status: DISCONTINUED | OUTPATIENT
Start: 2022-08-31 | End: 2022-08-31

## 2022-08-31 RX ORDER — PROPOFOL 10 MG/ML
INJECTION, EMULSION INTRAVENOUS CONTINUOUS PRN
Status: DISCONTINUED | OUTPATIENT
Start: 2022-08-31 | End: 2022-08-31

## 2022-08-31 RX ORDER — LIDOCAINE 40 MG/G
CREAM TOPICAL
Status: DISCONTINUED | OUTPATIENT
Start: 2022-08-31 | End: 2022-08-31 | Stop reason: HOSPADM

## 2022-08-31 RX ORDER — ONDANSETRON 2 MG/ML
4 INJECTION INTRAMUSCULAR; INTRAVENOUS
Status: DISCONTINUED | OUTPATIENT
Start: 2022-08-31 | End: 2022-08-31 | Stop reason: HOSPADM

## 2022-08-31 RX ORDER — FLUMAZENIL 0.1 MG/ML
0.2 INJECTION, SOLUTION INTRAVENOUS
Status: CANCELLED | OUTPATIENT
Start: 2022-08-31 | End: 2022-08-31

## 2022-08-31 RX ADMIN — LIDOCAINE HYDROCHLORIDE 0.1 ML: 10 INJECTION, SOLUTION EPIDURAL; INFILTRATION; INTRACAUDAL; PERINEURAL at 09:51

## 2022-08-31 RX ADMIN — SODIUM CHLORIDE, POTASSIUM CHLORIDE, SODIUM LACTATE AND CALCIUM CHLORIDE: 600; 310; 30; 20 INJECTION, SOLUTION INTRAVENOUS at 09:51

## 2022-08-31 RX ADMIN — PROPOFOL 100 MCG/KG/MIN: 10 INJECTION, EMULSION INTRAVENOUS at 10:49

## 2022-08-31 RX ADMIN — PROPOFOL 50 MG: 10 INJECTION, EMULSION INTRAVENOUS at 11:04

## 2022-08-31 RX ADMIN — PROPOFOL 40 MG: 10 INJECTION, EMULSION INTRAVENOUS at 10:49

## 2022-08-31 RX ADMIN — LIDOCAINE HYDROCHLORIDE 30 MG: 10 INJECTION, SOLUTION EPIDURAL; INFILTRATION; INTRACAUDAL; PERINEURAL at 10:49

## 2022-08-31 ASSESSMENT — ACTIVITIES OF DAILY LIVING (ADL): ADLS_ACUITY_SCORE: 35

## 2022-08-31 NOTE — ANESTHESIA POSTPROCEDURE EVALUATION
Patient: Irene Mullins    Procedure: Procedure(s):  COLONOSCOPY       Anesthesia Type:  General    Note:  Disposition: Outpatient   Postop Pain Control: Uneventful            Sign Out: Well controlled pain   PONV: No   Neuro/Psych: Uneventful            Sign Out: Acceptable/Baseline neuro status   Airway/Respiratory: Uneventful            Sign Out: Acceptable/Baseline resp. status   CV/Hemodynamics: Uneventful            Sign Out: Acceptable CV status; No obvious hypovolemia; No obvious fluid overload   Other NRE: NONE   DID A NON-ROUTINE EVENT OCCUR? No           Last vitals:  Vitals Value Taken Time   BP 91/63 08/31/22 1111   Temp     Pulse 66 08/31/22 1111   Resp 14 08/31/22 1111   SpO2 81 % 08/31/22 1122   Vitals shown include unvalidated device data.    Electronically Signed By: NGOC Watters CRNA  August 31, 2022  11:27 AM

## 2022-08-31 NOTE — H&P
"77 year old year old female here for colonoscopy for constipation.  Last colonoscopy was 3 years ago, negative.    Patient denies blood in stool or change in stool caliber.  There is no known family history of colon cancer or polyps.    Patient Active Problem List   Diagnosis     Calcific tendinitis of shoulder     Chronic tension-type headache, intractable     History of basal cell carcinoma     Insomnia     Irritable bowel syndrome     Right middle cranial fossa 2.2 x 1.9 x 1.8 cm meningioma     Osteopenia     Seborrheic keratosis     Presbyesophagus     Depression with anxiety     Analgesic rebound headache     Senile purpura (H)       No past medical history on file.    No past surgical history on file.    Family History   Problem Relation Age of Onset     Hypertension Mother      Myocardial Infarction Mother      Diabetes Sister      Coronary Artery Disease Sister      Hyperlipidemia Sister      Hyperlipidemia Brother      Cerebrovascular Disease No family hx of      Breast Cancer No family hx of      Colon Cancer No family hx of      Prostate Cancer No family hx of        No current outpatient medications on file.       Allergies   Allergen Reactions     Oxycodone-Acetaminophen      Other reaction(s): Dizziness, GI Upset     Sulfamethoxazole-Trimethoprim      Other reaction(s): Other - Describe In Comment Field  Hot flashes, face and arms red for 1 hour after taking medication     Tramadol Nausea and Vomiting     Valproic Acid      Other reaction(s): Other - Describe In Comment Field  Hair loss, elevated liver enzymes       Pt reports that she has quit smoking. She has never used smokeless tobacco. She reports previous alcohol use.    Exam:  /83 (BP Location: Right arm)   Pulse 71   Temp 98.3  F (36.8  C) (Oral)   Ht 1.6 m (5' 3\")   Wt 62.1 kg (137 lb)   SpO2 100%   BMI 24.27 kg/m      Awake, Alert OX3  Lungs - CTA bilaterally  CV - RRR, no murmurs, distal pulses intact  Abd - soft, non-distended, " non-tender, +BS  Extr - No cyanosis or edema    A/P 77 year old year old female in need of colonoscopy for constipation. Risks, benefits, alternatives, and complications were discussed including the possibility of perforation, bleeding, missed lesion and the patient agreed to proceed    Ray Cartwright DO on 8/31/2022 at 10:34 AM

## 2022-08-31 NOTE — LETTER
Irene Mullins  [address]   [facility] [address]      September 7, 2022    Dear Irene,  This letter is written to inform you of the results of your recent colonoscopy.  Your examination showed collagenous colitis. Your examination was otherwise without abnormality.    Large intestine, random, biopsies:  -Collagenous colitis, please see comment    Given these findings, you should follow-up with your primary care physician.    Please remember that this recommendation is made with the understanding that you are not experiencing persistent changes in bowel function, bleeding per rectum, and/or significant abdominal pain. If you experience these symptoms, please contact your primary care provider for a further evaluation.     If you have any questions or concerns about the results of your colonoscopy or the appropriate follow-up, please contact my assistant at [phone]    Sincerely,      Ray Cartwright, DO   [facility]  ___

## 2022-08-31 NOTE — ANESTHESIA CARE TRANSFER NOTE
Patient: Irene Mullins    Procedure: Procedure(s):  COLONOSCOPY       Diagnosis: Constipation, unspecified constipation type [K59.00]  Diagnosis Additional Information: No value filed.    Anesthesia Type:   General     Note:    Oropharynx: oropharynx clear of all foreign objects and spontaneously breathing  Level of Consciousness: awake and drowsy  Oxygen Supplementation: nasal cannula  Level of Supplemental Oxygen (L/min / FiO2): 2  Independent Airway: airway patency satisfactory and stable  Dentition: dentition unchanged  Vital Signs Stable: post-procedure vital signs reviewed and stable  Report to RN Given: handoff report given  Patient transferred to: PACU    Handoff Report: Identifed the Patient, Identified the Reponsible Provider, Reviewed the pertinent medical history, Discussed the surgical course, Reviewed Intra-OP anesthesia mangement and issues during anesthesia, Set expectations for post-procedure period and Allowed opportunity for questions and acknowledgement of understanding      Vitals:  Vitals Value Taken Time   BP 91/63 08/31/22 1111   Temp     Pulse 66 08/31/22 1111   Resp 14 08/31/22 1111   SpO2 100 % 08/31/22 1113   Vitals shown include unvalidated device data.    Electronically Signed By: NGOC Watters CRNA  August 31, 2022  11:14 AM

## 2022-09-01 LAB
PATH REPORT.COMMENTS IMP SPEC: NORMAL
PATH REPORT.FINAL DX SPEC: NORMAL
PATH REPORT.GROSS SPEC: NORMAL
PATH REPORT.MICROSCOPIC SPEC OTHER STN: NORMAL
PATH REPORT.RELEVANT HX SPEC: NORMAL
PHOTO IMAGE: NORMAL

## 2022-09-01 PROCEDURE — 88305 TISSUE EXAM BY PATHOLOGIST: CPT | Mod: 26 | Performed by: PATHOLOGY

## 2022-09-08 ENCOUNTER — TELEPHONE (OUTPATIENT)
Dept: GASTROENTEROLOGY | Facility: CLINIC | Age: 77
End: 2022-09-08

## 2022-09-14 NOTE — TELEPHONE ENCOUNTER
Patient stated she is doing much better and wishes to cancel her appointment.    Appointment cancelled.

## 2022-09-27 DIAGNOSIS — R41.9 NEUROCOGNITIVE DISORDER: ICD-10-CM

## 2022-09-27 RX ORDER — MEMANTINE HYDROCHLORIDE 10 MG/1
10 TABLET ORAL 2 TIMES DAILY
Qty: 60 TABLET | Refills: 2 | Status: SHIPPED | OUTPATIENT
Start: 2022-09-27 | End: 2022-11-28

## 2022-09-27 NOTE — TELEPHONE ENCOUNTER
Pending Prescriptions:                       Disp   Refills    memantine (NAMENDA) 10 MG tablet [Pharmac*30 tab*11           Sig: Take 1 tablet (10 mg) by mouth 2 times daily    LOV 3/23/22  NOV 11/30/22    Per 3/23/22 telephone encounter:  I have sent 2 prescriptions to patient's pharmacy.  First 1 is for Namenda 5 mg daily ramping it up to 10 mg twice daily in 1 month.  Second prescription effective 4/24/2022 is for maintenance dose of Namenda 10 mg twice daily.  Follow-up after work-up is complete    Rx sent 4/25/22 was for 15 day supply each fill (30 tablets, take 1 tablet BID). RN updated rx quantity and sent refills to pharmacy to allow for a 30 day supply (60 tablets, take 1 tablet BID).     Mark Fu, RN, BSN  [facility]

## 2022-10-14 ENCOUNTER — IMMUNIZATION (OUTPATIENT)
Dept: FAMILY MEDICINE | Facility: CLINIC | Age: 77
End: 2022-10-14
Payer: COMMERCIAL

## 2022-10-14 PROCEDURE — 91312 COVID-19,PF,PFIZER BOOSTER BIVALENT: CPT

## 2022-10-14 PROCEDURE — 90662 IIV NO PRSV INCREASED AG IM: CPT

## 2022-10-14 PROCEDURE — 0124A COVID-19,PF,PFIZER BOOSTER BIVALENT: CPT

## 2022-10-14 PROCEDURE — G0008 ADMIN INFLUENZA VIRUS VAC: HCPCS | Mod: 59

## 2022-11-16 ENCOUNTER — OFFICE VISIT (OUTPATIENT)
Dept: NEUROLOGY | Facility: CLINIC | Age: 77
End: 2022-11-16
Attending: PSYCHIATRY & NEUROLOGY
Payer: COMMERCIAL

## 2022-11-16 DIAGNOSIS — R41.9 NEUROCOGNITIVE DISORDER: ICD-10-CM

## 2022-11-16 DIAGNOSIS — F02.80 MAJOR NEUROCOGNITIVE DISORDER DUE TO MULTIPLE ETIOLOGIES (H): Primary | ICD-10-CM

## 2022-11-16 DIAGNOSIS — R41.3 MEMORY LOSS: ICD-10-CM

## 2022-11-16 PROCEDURE — 96136 PSYCL/NRPSYC TST PHY/QHP 1ST: CPT | Performed by: CLINICAL NEUROPSYCHOLOGIST

## 2022-11-16 PROCEDURE — 96116 NUBHVL XM PHYS/QHP 1ST HR: CPT | Performed by: CLINICAL NEUROPSYCHOLOGIST

## 2022-11-16 PROCEDURE — 96133 NRPSYC TST EVAL PHYS/QHP EA: CPT | Performed by: CLINICAL NEUROPSYCHOLOGIST

## 2022-11-16 PROCEDURE — 96137 PSYCL/NRPSYC TST PHY/QHP EA: CPT | Performed by: CLINICAL NEUROPSYCHOLOGIST

## 2022-11-16 PROCEDURE — 96138 PSYCL/NRPSYC TECH 1ST: CPT | Mod: 59 | Performed by: CLINICAL NEUROPSYCHOLOGIST

## 2022-11-16 PROCEDURE — 96132 NRPSYC TST EVAL PHYS/QHP 1ST: CPT | Performed by: CLINICAL NEUROPSYCHOLOGIST

## 2022-11-16 PROCEDURE — 96139 PSYCL/NRPSYC TST TECH EA: CPT | Mod: 59 | Performed by: CLINICAL NEUROPSYCHOLOGIST

## 2022-11-16 NOTE — PROGRESS NOTES
NEUROPSYCHOLOGY CONSULT  [facility] [place]    NAME: Irene Mullins    YOB: 1945   AGE: 77  EDU: 12  DATE OF EVALUATION: 11/16/2022    REASON FOR REFERRAL:  Ms. Mullins is a 77 year old, right-handed, White female presenting with concerns about cognitive functioning in the context of chronic headache, IBS, anxiety and right middle cranial fossa meningioma (stable since identified in 2016) . She was referred for a neurocognitive evaluation by her neurologist, Dr. Foss from [facility] to assist with differential diagnosis and care planning.     DIAGNOSTIC SUMMARY:  Due to the current COVID-19 pandemic that limits contact during in-person clinical visits, the testing portion of this assessment was conducted using face-to-face methods with PPE worn by the examiner and a face-mask for the patient. The standard administration of these tests involves in-person, direct face-to-face methods. The full impact of applying non-standard administration methods with PPE is not fully appreciated at this time. The diagnostic conclusions and recommendations provided in this report are being advanced with caution.    With these limitations in mind, results of testing indicate that Ms. Mullins is a woman of estimated (at least) average premorbid intellectual functioning whose performance is notable for borderline impaired confrontation naming, mildly impaired fund of general knowledge, rote verbal learning, and rote verbal memory, mild to moderately impaired cognitive efficiency, moderately impaired semantic fluency, inhibition and visuospatial judgment, and severely impaired complex attention, nonverbal learning, nonverbal memory, and comprehension of syntactically complex questions, In addition, significant variability was evident on measures of nonverbal reasoning with scores ranging from severely impaired to average. These weaknesses were evident in the context  of otherwise largely intact performance on measures of basic attention, prose learning, prose memory, aspects of language (sight word reading, verbal abstraction, comprehension, repetition, receptive picture vocabulary), and aspects of executive functioning (working memory, phonemic fluency). Finally, on self-report questionnaires, she denied any significant symptoms of anxiety but did endorse mild depressive symptoms. However, behaviorally, she appeared very anxious throughout most of the evaluation.     Summary for Providers   ASSESSMENT:    Impairments identified across a variety of cognitive domains but with notable areas of deficit in nonverbal skills (reasoning, basic judgment, construction, learning, memory) and aspects of semantic retrieval (semantic fluency, confrontation naming, fund of knowledge)    Additional areas of impairment include rote verbal learning, rote verbal memory, cognitive efficiency and some measures of executive functioning    Given the range and severity of cognitive impairments, it is very difficult to discern a specific cognitive profile to fully explain her presentation    In addition, anxiety is very likely playing a role and may be exaggerating some of the above deficits    The severity and virtually global nature of the nonverbal deficits is very striking and likely explains some of her family's report of unusual behaviors (getting lost when just going downstairs, mistaking a remote control for a phone). This non-dominant (presumably right) hemisphere dysfunction could represent sequela of the right middle cranial fossa meningioma. While this has been present since at least 2016, it is unclear why symptoms are so striking at this point and updated neuroimaging could be helpful in determining if it has grown in the last year.     The semantic retrieval deficits together with deficits in rote verbal learning and memory (as well as nonverbal learning and memory) could suggest an  additional emerging Alzheimer's process. Prose learning and memory are still intact but anxiety may be exaggerating the severity of her other deficits.     I did consider a possible PPA syndrome given the severity of word finding difficulties observed during the interview, but more extensive language testing did not clearly support the presence of a PPA. Further, PPA would not be able to explain the memory deficit as easily as an an emerging Alzheimer's process.     I also considered a visual variant of Alzheimer's (I.e., posterior cortical atrophy); however, verbal skills and particularly verbal memory are generally not impacted as much this early in the course    At this time, her presentation appears to reflect multiple factors including likely impact of right sided meningioma and an emerging Alzheimer's, with anxiety likely exaggerating the severity of these deficits.     Importantly, while I feel that anxiety may be exaggerating some of her current deficits, I do not believe that mood factors alone could explain her presentation    Diagnosis: Major Neurocognitive Disorder due to Multiple Etiologies (right sided meningioma and Alzheimer's)    PLAN:    Updated neuroimaging could be considered, if deemed appropriate by her physician, to determine if the meningioma has grown (possible explanation for worsening symptoms)    Continued use of cognitive enhancing medication    Consider adjustment to mood medications to better manage anxious symptoms    Continue to receive assistance with managing finances and would also benefit from assistance with managing medications    Continue to refrain from driving    Continue to take care of herself physically and to stay cognitively active    Re-evaluation in 18-24 months to monitor speed of progression (as I do believe that current anxiety is exaggerating the severity of her symptoms at present)    FEEDBACK:  Ms. Mullins will receive the results of this evaluation from her  referring provider at the time of an upcoming follow-up appointment; however, she was encouraged to schedule a formal feedback appointment with me after that appointment should she have any additional questions.     Thank you for allowing me to participate in Ms. Mullins's care.  Please contact me with any questions regarding the content of this report.      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 Mullins would benefit from help in her daily activities particularly in terms of managing medications (i.e., having a pillbox set up for her and having someone make sure that she is taking her medications regularly and as prescribed). She will also continue to benefit from assistance with managing her finances.     Given her significant spatial deficits, Ms. Mullins is encouraged to continue to refrain from driving.     It is strongly recommended that a family member or close friend accompany Ms. Mullins to all appointments to ensure that accurate information is given to providers and instructions are followed. It will be helpful to present the information in brief, repeated segments and have her repeat back the information in her own words to ensure understanding.     If not already done, completion of paperwork for advance directives and assignment of healthcare and financial power of  should be considered at this time.  Family Resources    It will be increasingly important for Ms. Mullins and her family to have a strong support network in place. The  [employer]zheimer s [employer] (http://www.alz.org/mnnd 3-761-078-5194) has information about local support groups as well as informational materials.   Physical and Emotional Health    Ms. Mullins will continue to benefit from the use of a cognitive enhancing medication (I.e., Namenda).    Given the severity of Ms. Mullins's spatial deficits, updated neuroimaging could be considered to determine if there has been a change in the meningioma. Her PCP or Neurologist can determine if an updated MRI is appropriate.     It is important that Ms. Mullins continue to adhere to her medication treatment regimen and follow a healthy diet so as to maintain her physical health, as this can have a significant impact on her physical, emotional, and cognitive functioning.     Ms. Mullins appeared rather anxious throughout today's evaluation. She is encouraged to speak with her Neurologist or PCP to determine if an adjustment or alternative medications may help Ms. Mullins better manage her mood.     In the meantime, behavioral activation techniques such as regular exercise (under the guidance of her physician), recreational activities and regular social interaction (with proper social distancing when indicated) would likely be effective in helping Ms. Mullins to manage her mood.   Memory and Organization    Ms. Mullins is encouraged to continue to engage in stimulating activities, (i.e., reading, card games, puzzles) to keep her cognitively active.     In her daily life, Ms. Mullins will continue to benefit from the use of compensation techniques. That is, she may find it helpful to post reminder notes around the house, make lists, and carry a small calendar so that she can feel more comfortable and confident in her ability to remember information. A daily planner could also be used as a memory book where important information is recorded and organized for future reference.     Ms. Mullins should also create a system to establish set locations for certain  items (i.e., keys) such that she always knows where to put them upon entering the house and where to look when she needs them. If she would like to keep certain items out of sight (i.e., a wallet), she could set up a specific hidden place to keep items and use that same place so as to ensure she can find the required item when needed.     Ms. Mullins and her family should be aware that she is demonstrating significant deficits in spatial skills. She is very likely to get easily lost and turned around (even in familiar areas) and even to mis-perceive objects. It is strongly recommended that she not travel independently so that family and/or friends can help her compensate for these weaknesses.    When required to learn new information, Ms. Mullins does best when information is placed within a meaningful context (I.e,. a story).  Learning will be facilitated if it is made personally meaningful for her and is not presented in a rote fashion (i.e., a list of unrelated information or unconnected tasks to accomplish).     Ms. Mullins exhibits memory impairments but does benefit from cues, thus she will do best when provided with reminders to facilitate retrieval of important information.     Ms. Mullins will do best in an environment where a lot of routines are established so that she can follow a regular pattern for her daily or weekly routines.     Ms. Mullins demonstrates intact basic attention but notable memory impairments, thus, she should not be given instructions for tasks any more than a few minutes in the future.    Ms. Mullins should be aware that she may not be able to process information as quickly and efficiently as she once could. Thus she should allow herself extra time to complete tasks and not try and work under extreme time constraints.   Follow-up    Re-evaluation in 18-24 months is recommended. The current test data can be used as a baseline to which future comparisons can be  "made.    --------------------------------EXTENDED REPORT--------------------------------  Verbal consent for neuropsychological testing was received following the provision of information about the nature of the evaluation, and the opportunity to ask questions.     HISTORY OF PRESENTING PROBLEM:    Relevant History  Ms. Mullins was seen in Neurology by Dr. Foss on March 23, 2022, \"Patient developed memory difficulty in the past and had lab work that included normal B12, TSH and was started on Namenda.  She does not recall seeing a neurologist for her cognitive issues.  Part of work-up for her cognitive issues and headache included a MRI of the brain in 2021 that showed right middle cranial fossa meningioma.       According to patient about 2 to 3 years ago she started noticing difficulty with short-term memory.  She used to be an  but had difficulty paying the bills at home.  Her  took over.  She did not notice any difficulty with remote memory.  She denies any change in her personality or any hallucinations.  They recently moved into an senior apartment and she is having difficulty finding her apartment at time.  She noticed some difficulty with her balance.  She was started on Namenda 5 mg daily and has been on it for some time.\" During that visit her MoCA was well below expectation (16/30).    Of note, a 2017 mini-cog was 3/5 (missed two words) and in 2021, Ms. Mullins's clock was abnormal but she missed only one word.     Current Interview  Ms. Mullins was accompanied by her  Danile and son John and was a variable historian. Together they provided the following information.     At the present time, Ms. Mullins reports an approximate 1-1.5 year history of concerns about her memory and thinking skills.  She described how she is forgetful of information in the moment, even a few minutes after she hears it.  She described forgetting intended tasks, and forgetting names both with people she has just met " "as well as famous actors from the past, something which she has previously been very good at remembering.  She noted that she remembers older actors from the past much better but then her  will point them out and she feels they do not look right noting that she remembers them as their younger selves rather than as they have aged.  She described word finding problems noting that she will sometimes \"call things by the wrong name.\"  She also described mixing up birthdays. She was able to describe the ongoing COVID pandemic and appropriate timeline.    Ms. Mullins's family generally agreed with the above description noting that she has been struggling with memory and word finding difficulties.  Her  explained how she does not like to answer the phone anymore as she worries that she will forget what she has been told.  She added that her  is on warfarin and often they will call with an adjustment on his medication and she worries she is going to lose track of that information.  Her  also described an incident a couple weeks ago in which the phone was ringing and she picked up the remote control for the TV and began talking into it. Her  did say she has good days and bad days.  Her  also noted that they live on the second floor of their apartment building and sometimes when she goes downstairs she cannot find her way back. Ms. Mullins was able to correctly indicate that they moved to a senior apartment (independent living) in [place] almost 2 years ago (will be 2 years in March 2023).    With regard to the activities of daily living, Ms. Mullins reported that that she continues to cook; however it does not taste the same as it used to.  Her  indicated that she used to be \"a gourmet cook,\" but her food is just not the same.  Ms. Mullins indicated that her  had to take over management of the bills, something that she had previously done in the past because she describes " "how she would often sit down and try and pay 3 bills and it would take her an hour; she had to check again and again and sometimes would catch mistakes.  Even so she indicated that she is still managing her own medications and is also managing her 's including his warfarin even though she adds, \"I am skeptical,\" indicating some concerns about this. (She was able to correctly report her current dosage of memantine and the recent change in dosage as well as the correct dosage of citalopram.) She indicated that she quit driving when she moved to [place] in March 2021.  She had previously been driving without difficulty when at their former home.    MEDICAL HISTORY:  Ms. Mullins's current problem list includes   Patient Active Problem List   Diagnosis     Calcific tendinitis of shoulder     Chronic tension-type headache, intractable     History of basal cell carcinoma     Insomnia     Irritable bowel syndrome     Right middle cranial fossa 2.2 x 1.9 x 1.8 cm meningioma     Osteopenia     Seborrheic keratosis     Presbyesophagus     Depression with anxiety     Analgesic rebound headache     Senile purpura (H)     Ms. Mullins indicated that she has been struggling with \"ice pick headaches,\" for a couple years now.  She explained, however, that they have improved since Dr. Foss increased her dose of Namenda from 5mg to 10 mg twice a day.  She noted that he also encouraged her to stop any over-the-counter medications and together this has reduced her headaches from several times a day to once a week. Even then, they come and go quickly.    Ms. Mullins described experiencing shaking in her hands in particular and her son added that it is worse in \"these kinds of situations,\" referring to her appointment today.    Ms. Mullins denied any history of stroke, seizure or head injury with loss of consciousness and her family agreed. She denied having tested positive for COVID or experiencing an illness concerning for COVID and " "her family agreed.    Ms. Mullins denied any significant changes in her vision but her family indicated that they feel her hearing is somewhat reduced.  She also indicated that food does not quite taste the same to her and even when her  says something tastes good, she can't always tell.  She denied any significant changes in her appetite and both she and her family denied any changes in her food preferences.  When asked about her sleep, she indicated its \"not too bad,\" noting that she has been doing better with melatonin in the last year.  Her  did note that sometimes she whines in her sleep and she indicated that she has strange dreams but she does not remember them.  Her  also noted that for many years now she will sometimes \"slap him,\" while she is still asleep but this happens maybe a couple times a year at most and has been going on for some time.  He otherwise denied any other unusual sleep behavior.    Ms. Mullins described a history of meningioma that she feels has been present for 3 or 4 years, (2016 per records).  However, both she and her family indicated it has been stable since it was first discovered incidentally.    Diagnostic studies:  An MRI of the brain dated November 23, 2021 revealed, \"There is a T1 and T2 hypointense nodule along the floor of the right middle cranial fossa measuring 2.2 x 1.9 x 1.8 cm in size consistent with meningioma described in the patient's history. There is moderate diffuse cerebral volume loss. There are numerous scattered focal and minimal patchy periventricular areas of abnormal T2 signal hyperintensity in the cerebral white matter bilaterally that are consistent with sequela of chronic small vessel ischemic disease. The ventricles and basal cisterns are within normal limits in configuration given the degree of cerebral volume loss. There is no midline shift. There are no extra-axial fluid collections. There is no evidence for stroke or acute " "intracranial hemorrhage.\"    Past Surgical History:   Procedure Laterality Date     COLONOSCOPY N/A 8/31/2022    Procedure: COLONOSCOPY;  Surgeon: Ray Cartwright DO;  Location: [facility]     Current medications include (per medical record):   Current Outpatient Medications:      calcium carbonate-vitamin D (LIQUID CALCIUM/VITAMIN D) 600-200 MG-UNIT CAPS, Take 1 tablet by mouth daily, Disp: , Rfl:      citalopram (CELEXA) 40 MG tablet, Take 1 tablet (40 mg) by mouth daily, Disp: 90 tablet, Rfl: 3     Cranberry-Vitamin C (AZO CRANBERRY URINARY TRACT PO), Take by mouth daily, Disp: , Rfl:      melatonin 5 MG tablet, Take 5 mg by mouth nightly as needed for sleep, Disp: , Rfl:      memantine (NAMENDA) 10 MG tablet, Take 1 tablet (10 mg) by mouth 2 times daily, Disp: 60 tablet, Rfl: 2     Omega-3 Fatty Acids (FISH OIL PO), Take 1,200 mg by mouth daily, Disp: , Rfl:      zinc 50 MG TABS, , Disp: , Rfl: .    FAMILY HISTORY:   Family medical history is significant for:   Family History   Problem Relation Age of Onset     Hypertension Mother      Myocardial Infarction Mother      Diabetes Sister      Coronary Artery Disease Sister      Hyperlipidemia Sister      Hyperlipidemia Brother      Cerebrovascular Disease No family hx of      Breast Cancer No family hx of      Colon Cancer No family hx of      Prostate Cancer No family hx of    They were unaware of any neurologic or neurodegenerative conditions in the family.    PSYCHIATRIC AND SUBSTANCE USE HISTORY:  With regard to her psychiatric history, Ms. Mullins denied a history of past psychiatric conditions or mental health treatment other than being on citalopram for the last few years (since at least 2017 per records).  When asked about her current mood she described it as \"not too bad.\"  She acknowledged that she is anxious today and has been worried about this appointment. She also shared that she has always had some obsessive-compulsive tendencies. She finds a lot " "of support from her [facility]. She denied any suicidal ideation, plan or intent or hallucinations or delusions.     With regard to substance use, Ms. Mullins denied any history of problematic alcohol use and her family agreed.  She stated that she actually stopped drinking about 30 to 40 years ago as her  was having trouble with drinking and she just stopped when he did. She did previously smoke but quit over 50 years ago.  She denied any recreational drug use.    SOCIAL HISTORY:  Ms. Mullins was born and raised in [place]. She was unaware of any complications in her mother's pregnancy with her or in her birth, or delays in reaching developmental milestones. She denied a history of early learning or attention difficulties, individualized instruction, or grade repetition. She described herself as a good student, stating how she graduated towards the top of her class.  She denied any further college but did indicate that she took some courses, but could not describe the nature of these.    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  He sold the business in 2009 and she has not worked since then.    Ms. Mullins was able to correctly indicate that she has been  to her  for 56 years and they have 1 son and 2 grandchildren.    BEHAVIORAL OBSERVATIONS:   Ms. Mullins arrived 5 minutes early and accompanied by her son and  to today's appointment. She was appropriately dressed and groomed. She was alert and engaged during the interview.  Gait was slow and slightly unsteady. " " She appeared very anxious during the interview, frequently taking deep breaths and she exhibited shaking in her hands which her son described as being more noticeable when she is anxious such as situations like doctors appointments. She was pleasant and cooperative. Rate and prosody of speech were grossly normal.  Speech content was notably tangential and she was often difficult to redirect.  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 like [place]... with different money.\" No paraphasic errors were evident and speech was fluent. There was no evidence of a lora thought disorder; no hallucinations or delusions were apparent. Judgment and insight appeared fair.       Ms. Mullins appeared adequately motivated and engaged easily in the testing component of the evaluation. That said, she did appear anxious throughout the evaluation. Her performance was fully intact on embedded measures of objective effort. She attempted all tasks presented to her and worked at a steady pace.  Instructions often required repetition and rephrasing both to assist with comprehension and because she would indicate that she did not recall what she was supposed to do.  She did often appear frustrated by challenging tasks and occasionally exhibiting a negative or self-critical response style, When she perceived tasks as especially difficult, she would often appear more anxious and upset and began to tear up.  Despite the examiner's efforts to encourage and redirect, she stayed pretty much sad and upset for extended periods. She very much struggled with several tasks, notably BD, where she expressed that she just did not know " how to arrange the blocks.  Despite repeated instructions, she could not comprehend how to complete Trails B.  She very much struggled on the initial items of ROBERT but persisted. Towards the end of the evaluation she did rub her shoulders a lot and expressed that she was experience discomfort in her neck and shoulders, but still persisted with testing. Otherwise, no significant barriers to testing were observed and the following is judged to be a reasonable representation of Ms. Mullins's current cognitive strengths and weaknesses.    LIMITATIONS:  Due to circumstances that limit contact during in-person clinical visits, this assessment was conducted using face-to-face testing with the examiner wearing EARTHTORY designated PPE and the patient wearing a face mask. The standard administration of these procedures involves in-person, face-to-face methods without PPE. The impact of applying non-standard administration methods has been evaluated only in part by scientific research. While every effort was made to simulate standard assessment practices, the diagnostic conclusions and recommendations for treatment provided in this report are being advanced with these limitations in mind.    TESTS ADMINISTERED:   Performance Validity measures, BDAE (Complex Ideational Material: CIM, [place] Naming Test: BNT), [place]reras Judgment of Line Orientation-Form V (ROBERT), Brief Visuospatial Memory Test - R Form 1 (BVMT-R), Category Fluency- AFV (CAT), Clock Drawing, Controlled Oral Word Association Test CFL (COWAT), Generalized Anxiety Disorder-7 (TELLO-7), Geriatric Depression Scale 30 (GDS), Vargas Verbal Learning Test - R Form 1 (HVLT-R), RUSSO (Sentence Repetition, Token Test), [place] Picture Vocabulary Test, Fourth Edition Form A (PPVT-4), Stroop Color and Word Test, Trail Making Test (TMT), WAIS-IV (Similarities, Information, Block Design, Matrix Reasoning, Digit Span), WMS-IV (Logical Memory, Visual Reproduction), WRAT-5 Word  Reading (green) and WMS-III Information and Orientation.    Jenn norms were used for CIM  MOANS norms were used for BNT, CAT, COWAT, ROBERT, Stroop, TMT, Tokens  (Raw scores in parentheses)    DESCRIPTIVE PERFORMANCE KEY:    Labels for tests with Normal Distributions  Score Label Standard Score %ile Rank   Exceptionally high score  > 130 > 98   Above average score 120-129 91-97   High average score 110-119 75-90   Average score  25-74   Low average score 80-89 9-24   Below average score 70-79 2-8   Exceptionally low score < 70 < 2     Labels for tests with Non-Normal Distributions  Score Label %ile Rank   Within normal expectations/ limits score (WNL) > 24   Low average score 9-24   Below average score 2-8   Exceptionally low score < 2     The following test results utilize score labels as adapted from Tao Bustillo, Samson Salinas, Keesha Jules, GAGAN Hilliard, Sheree Bowser, Sudhakar Blas, Bobby Todd & Conference Participants (2020): American Academy of Clinical Neuropsychology consensus conference statement on uniform labeling of performance test scores, The Clinical Neuropsychologist, DOI: 10.1080/[email or web address]    All scores contain some measure of error; scores are reported here as they are obtained by the individual (without reference to the range of error). These are meant as labels and not interpretation of performance. While other relevant comments regarding task performance are provided below, please see the Assessment, Impressions and Diagnostic Summary sections of this report for interpretation of the scores and the cognitive profile as a whole, including what does and does not constitute impairment.    OPTIMAL PREMORBID INTELLECT:  Optimal premorbid intellectual abilities were estimated as falling in at least the average range based on Ms. Mullins's educational and occupational histories and performance on tasks least likely to be affected by acquired brain  "dysfunction (i.e.,  hold tests ).    SUMMARY OF TEST RESULTS:     Orientation, Attention and Processing Speed  Mental status exam was measured as a within normal limits score for her age (13). She was oriented to person, city, time, and date and was able to correctly name the current and previous presidents.  She was unable to provide the name of the clinic location stating \"[place] something.\"    Performance on a measure of basic attention and working memory was assessed as an average score (23). This reflected a high average score for basic attention skills (LDF = 7) and working memory scores that ranged from a low average score (LDS = 5) to an average score (LDB = 4).    A speeded word reading task (54) and a speeded color naming task (34) were both assessed as a below average score. A simple sequencing task (181\" ) was assessed as an exceptionally low score.     Language  Sight word reading skills (58) were assessed as an average score. Verbal abstraction skills (16) were assessed as a low average score. Fund of general knowledge (5) was assessed as a below average score. Her performance on a measure of semantic fluency was measured as an exceptionally low score (13). Confrontation naming was measured as a below average to low average score (40).  Repetition of sentences was assessed as a low average score (10).  She performed well below expectation on the complex ideational material (8) and earned an exceptionally low score.  She answered two questions incorrectly on 2 different stories.  In contrast, comprehension of increasingly complex commands was assessed as an average score (Tokens = 42) and receptive picture vocabulary was also assessed as an average score (204).  She was able to write a complete sentence without error.    Visuospatial Skills  Performance on a block construction task (5) resulted in an exceptionally low score. Performance on a pattern completion task (9) was measured as an average score. " Basic visual spatial judgment was assessed as a below average score (7).  Her copy of a series of 7 geometric figures was assessed as an exceptionally low score (VR copy = 27) and her copy of a series of 6 simple geometric figures (8) was also performed below expectation.    Learning and Memory  Immediate memory for two short stories was measured as an average score (24). Delayed recall of these stories also resulted in an average score (11, 73% retention). Recognition memory was measured as a high average score (22/23). She was also administered a measure of rote auditory verbal list learning that required her to learn a series of 12 words over three trials and retain and recall them over a delay. Her initial rate of learning (2,6,6) reflected a below average score. She retained and recalled 3 words (50% retention) after the delay for a below average score for delayed recall.  Recognition memory was measured as a low average score as she correctly identified 10 of the original words and made 1 false positive error.     Immediate nonverbal memory (0,0,0) for a series of 6 geometric figures presented over three trials was measured as an exceptionally low score while delayed recall of these figures (1 detail) resulted in an exceptionally low score. Recognition memory was measured as a below average score as she correctly identified 5 of the original figures and made 3 false positive errors.    Immediate single trial nonverbal learning for a series of geometric figures was assessed as an exceptionally low score (5). Delayed recall of these figures was measured as an exceptionally low score (0). Recognition memory was assessed as an exceptionally low score.as she correctly identified none of the original 7 figures in a multiple choice format.    Executive Functioning  Working memory skills ranged from a low average score (LDS = 5) to an average score (LDB = 4). A complex sequencing and set shifting task was discontinued  due to 4 errors during the practice trial. Her performance on a measure of phonemic fluency resulted in a high average score (44). Her ability to inhibit an over-learned response was assessed as an exceptionally low score (5). Her performance on this task was notable for 2 errors.  Her first attempt at drawing a clock was notable for poor planning but she started the numbers in the correct place but ended poorly (12 ended up near the 6:00 spot).  In her second attempt, she elijah a [place] but then struggled to figure out where to place numbers.  She placed the one in the 12:00 slot a 5 at the 3:00 slot, a 12 at the 6:00 slot, and a 4 at the 9:00 slot.    Mood  On the Geriatric Depression Scale-30 (GDS), a self report measure of depressive symptomatology, she obtained a score of 11, placing her in the range of mild symptoms of depression.     On the Generalized Anxiety Disorder-7, a self-report measure of anxiety, she obtained a score of 2, placing her in the range of minimal anxiety.     EVALUATION SERVICES AND TIME:   A clinical interview/neurobehavioral status examination was conducted with the patient and documented. I thoroughly reviewed the medical record, selected the neuropsychological test battery, provided supervision to the individual who administered and scored the neuropsychological test battery, interpreted/integrated patient data and test results, engaged in clinical decision making, treatment planning and report writing/preparation. A trained examiner/technician directly administered and scored 2+ of the neuropsychological tests and I administered the remainder of the neuropsychological tests (2 + tests). Please see below for a breakdown of time spent and the associated codes billed for these services.     Services   Time Spent  CPT Codes   Neurobehavioral Status Exam:  (e.g., face-to-face, interpretation, report) 60 minutes 1 x 96116   Neuropsychological Evaluation Services:   (e.g., integration,  interpretation, treatment planning, clinical decision making, feedback)   175 minutes   1 x 96132  2 x 96133   Neuropsychological Testing by Psychologist:  (e.g., test administration, scoring, 2+ tests administered)   55 minutes   1 x 96136  1 x 96137   Neuropsychological Testing by Trained Examiner/Technician:  (e.g., test administration, scoring, 2+ tests administered)   210 minutes   1 x 96138  6 x 96139     Diagnosis:  Diagnosis: 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 Feedback of results will be provided by her referring provider at the time of an upcoming follow-up appointment.       Iamni Purcell, PhD, LP, ABPP  Clinical Neuropsychologist, [id number]  Board Certified in Clinical Neuropsychology    [facility] [place] [address], [address]  [place] [address]  Phone:  [phone]

## 2022-11-16 NOTE — LETTER
11/16/2022         RE: Irene Mullins  [address]  [facility] [address]        Dear Colleague,    Thank you for referring your patient, Irene Mullins, to the [facility]. Please see a copy of my visit note below.    Please note, I have NOT discussed the results with Ms. Mullins. She will be seeing you on Nov 30th and opted to obtain the results at that time. If she has further questions, she is welcome to contact our clinic [phone] to schedule a formal follow-up with me at any time.       NEUROPSYCHOLOGY CONSULT  [facility]    NAME: Irene Mullins    YOB: 1945   AGE: 77  EDU: 12  DATE OF EVALUATION: 11/16/2022    REASON FOR REFERRAL:  Ms. Mullins is a 77 year old, right-handed, White female presenting with concerns about cognitive functioning in the context of chronic headache, IBS, anxiety and right middle cranial fossa meningioma (stable since identified in 2016) . She was referred for a neurocognitive evaluation by her neurologist, Dr. Foss from [facility] to assist with differential diagnosis and care planning.     DIAGNOSTIC SUMMARY:  Due to the current COVID-19 pandemic that limits contact during in-person clinical visits, the testing portion of this assessment was conducted using face-to-face methods with PPE worn by the examiner and a face-mask for the patient. The standard administration of these tests involves in-person, direct face-to-face methods. The full impact of applying non-standard administration methods with PPE is not fully appreciated at this time. The diagnostic conclusions and recommendations provided in this report are being advanced with caution.    With these limitations in mind, results of testing indicate that Ms. Mullins is a woman of estimated (at least) average premorbid intellectual functioning whose performance is notable for borderline impaired confrontation  naming, mildly impaired fund of general knowledge, rote verbal learning, and rote verbal memory, mild to moderately impaired cognitive efficiency, moderately impaired semantic fluency, inhibition and visuospatial judgment, and severely impaired complex attention, nonverbal learning, nonverbal memory, and comprehension of syntactically complex questions, In addition, significant variability was evident on measures of nonverbal reasoning with scores ranging from severely impaired to average. These weaknesses were evident in the context of otherwise largely intact performance on measures of basic attention, prose learning, prose memory, aspects of language (sight word reading, verbal abstraction, comprehension, repetition, receptive picture vocabulary), and aspects of executive functioning (working memory, phonemic fluency). Finally, on self-report questionnaires, she denied any significant symptoms of anxiety but did endorse mild depressive symptoms. However, behaviorally, she appeared very anxious throughout most of the evaluation.     Summary for Providers   ASSESSMENT:    Impairments identified across a variety of cognitive domains but with notable areas of deficit in nonverbal skills (reasoning, basic judgment, construction, learning, memory) and aspects of semantic retrieval (semantic fluency, confrontation naming, fund of knowledge)    Additional areas of impairment include rote verbal learning, rote verbal memory, cognitive efficiency and some measures of executive functioning    Given the range and severity of cognitive impairments, it is very difficult to discern a specific cognitive profile to fully explain her presentation    In addition, anxiety is very likely playing a role and may be exaggerating some of the above deficits    The severity and virtually global nature of the nonverbal deficits is very striking and likely explains some of her family's report of unusual behaviors (getting lost when just  going downstairs, mistaking a remote control for a phone). This non-dominant (presumably right) hemisphere dysfunction could represent sequela of the right middle cranial fossa meningioma. While this has been present since at least 2016, it is unclear why symptoms are so striking at this point and updated neuroimaging could be helpful in determining if it has grown in the last year.     The semantic retrieval deficits together with deficits in rote verbal learning and memory (as well as nonverbal learning and memory) could suggest an additional emerging Alzheimer's process. Prose learning and memory are still intact but anxiety may be exaggerating the severity of her other deficits.     I did consider a possible PPA syndrome given the severity of word finding difficulties observed during the interview, but more extensive language testing did not clearly support the presence of a PPA. Further, PPA would not be able to explain the memory deficit as easily as an an emerging Alzheimer's process.     I also considered a visual variant of Alzheimer's (I.e., posterior cortical atrophy); however, verbal skills and particularly verbal memory are generally not impacted as much this early in the course    At this time, her presentation appears to reflect multiple factors including likely impact of right sided meningioma and an emerging Alzheimer's, with anxiety likely exaggerating the severity of these deficits.     Importantly, while I feel that anxiety may be exaggerating some of her current deficits, I do not believe that mood factors alone could explain her presentation    Diagnosis: Major Neurocognitive Disorder due to Multiple Etiologies (right sided meningioma and Alzheimer's)    PLAN:    Updated neuroimaging could be considered, if deemed appropriate by her physician, to determine if the meningioma has grown (possible explanation for worsening symptoms)    Continued use of cognitive enhancing medication    Consider  adjustment to mood medications to better manage anxious symptoms    Continue to receive assistance with managing finances and would also benefit from assistance with managing medications    Continue to refrain from driving    Continue to take care of herself physically and to stay cognitively active    Re-evaluation in 18-24 months to monitor speed of progression (as I do believe that current anxiety is exaggerating the severity of her symptoms at present)    FEEDBACK:  Ms. Mullins will receive the results of this evaluation from her referring provider at the time of an upcoming follow-up appointment; however, she was encouraged to schedule a formal feedback appointment with me after that appointment should she have any additional questions.     Thank you for allowing me to participate in Ms. Mullins's care.  Please contact me with any questions regarding the content of this report.      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 Mullins would benefit from help in her daily activities particularly in terms of managing medications (i.e., having a pillbox set up for her and having someone make sure that she is taking her medications regularly and as prescribed). She will also continue to benefit from assistance with managing her finances.     Given her significant spatial deficits, Ms. Mullins is encouraged to continue to refrain from driving.     It is strongly recommended that a family member or close friend accompany Ms. Mullins to all appointments to  ensure that accurate information is given to providers and instructions are followed. It will be helpful to present the information in brief, repeated segments and have her repeat back the information in her own words to ensure understanding.     If not already done, completion of paperwork for advance directives and assignment of healthcare and financial power of  should be considered at this time.  Family Resources    It will be increasingly important for Ms. Mullins and her family to have a strong support network in place. The Alzheimer s Association (http://www.alz.org/mnnd 1-696.606.1264) has information about local support groups as well as informational materials.   Physical and Emotional Health    Ms. Mullins will continue to benefit from the use of a cognitive enhancing medication (I.e., Namenda).    Given the severity of Ms. Mullins's spatial deficits, updated neuroimaging could be considered to determine if there has been a change in the meningioma. Her PCP or Neurologist can determine if an updated MRI is appropriate.     It is important that Ms. Mullins continue to adhere to her medication treatment regimen and follow a healthy diet so as to maintain her physical health, as this can have a significant impact on her physical, emotional, and cognitive functioning.     Ms. Mullins appeared rather anxious throughout today's evaluation. She is encouraged to speak with her Neurologist or PCP to determine if an adjustment or alternative medications may help Ms. Mullins better manage her mood.     In the meantime, behavioral activation techniques such as regular exercise (under the guidance of her physician), recreational activities and regular social interaction (with proper social distancing when indicated) would likely be effective in helping Ms. Mullins to manage her mood.   Memory and Organization    Ms. Mullins is encouraged to continue to engage in stimulating activities, (i.e., reading, card games, puzzles) to  keep her cognitively active.     In her daily life, Ms. Mullins will continue to benefit from the use of compensation techniques. That is, she may find it helpful to post reminder notes around the house, make lists, and carry a small calendar so that she can feel more comfortable and confident in her ability to remember information. A daily planner could also be used as a memory book where important information is recorded and organized for future reference.     Ms. Mullins should also create a system to establish set locations for certain items (i.e., keys) such that she always knows where to put them upon entering the house and where to look when she needs them. If she would like to keep certain items out of sight (i.e., a wallet), she could set up a specific hidden place to keep items and use that same place so as to ensure she can find the required item when needed.     Ms. Mullins and her family should be aware that she is demonstrating significant deficits in spatial skills. She is very likely to get easily lost and turned around (even in familiar areas) and even to mis-perceive objects. It is strongly recommended that she not travel independently so that family and/or friends can help her compensate for these weaknesses.    When required to learn new information, Ms. Mullins does best when information is placed within a meaningful context (I.e,. a story).  Learning will be facilitated if it is made personally meaningful for her and is not presented in a rote fashion (i.e., a list of unrelated information or unconnected tasks to accomplish).     Ms. Mullins exhibits memory impairments but does benefit from cues, thus she will do best when provided with reminders to facilitate retrieval of important information.     Ms. Mullins will do best in an environment where a lot of routines are established so that she can follow a regular pattern for her daily or weekly routines.     Ms. Mullins demonstrates intact basic attention  "but notable memory impairments, thus, she should not be given instructions for tasks any more than a few minutes in the future.    Ms. Mullins should be aware that she may not be able to process information as quickly and efficiently as she once could. Thus she should allow herself extra time to complete tasks and not try and work under extreme time constraints.   Follow-up    Re-evaluation in 18-24 months is recommended. The current test data can be used as a baseline to which future comparisons can be made.    --------------------------------EXTENDED REPORT--------------------------------  Verbal consent for neuropsychological testing was received following the provision of information about the nature of the evaluation, and the opportunity to ask questions.     HISTORY OF PRESENTING PROBLEM:    Relevant History  Ms. Mullins was seen in Neurology by Dr. Foss on March 23, 2022, \"Patient developed memory difficulty in the past and had lab work that included normal B12, TSH and was started on Namenda.  She does not recall seeing a neurologist for her cognitive issues.  Part of work-up for her cognitive issues and headache included a MRI of the brain in 2021 that showed right middle cranial fossa meningioma.       According to patient about 2 to 3 years ago she started noticing difficulty with short-term memory.  She used to be an  but had difficulty paying the bills at home.  Her  took over.  She did not notice any difficulty with remote memory.  She denies any change in her personality or any hallucinations.  They recently moved into an senior apartment and she is having difficulty finding her apartment at time.  She noticed some difficulty with her balance.  She was started on Namenda 5 mg daily and has been on it for some time.\" During that visit her MoCA was well below expectation (16/30).    Of note, a 2017 mini-cog was 3/5 (missed two words) and in 2021, Ms. Mullins's clock was abnormal but she " "missed only one word.     Current Interview  Ms. Mullins was accompanied by her  Daniel and son John and was a variable historian. Together they provided the following information.     At the present time, Ms. Mullins reports an approximate 1-1.5 year history of concerns about her memory and thinking skills.  She described how she is forgetful of information in the moment, even a few minutes after she hears it.  She described forgetting intended tasks, and forgetting names both with people she has just met as well as famous actors from the past, something which she has previously been very good at remembering.  She noted that she remembers older actors from the past much better but then her  will point them out and she feels they do not look right noting that she remembers them as their younger selves rather than as they have aged.  She described word finding problems noting that she will sometimes \"call things by the wrong name.\"  She also described mixing up birthdays. She was able to describe the ongoing COVID pandemic and appropriate timeline.    Ms. Mullins's family generally agreed with the above description noting that she has been struggling with memory and word finding difficulties.  Her  explained how she does not like to answer the phone anymore as she worries that she will forget what she has been told.  She added that her  is on warfarin and often they will call with an adjustment on his medication and she worries she is going to lose track of that information.  Her  also described an incident a couple weeks ago in which the phone was ringing and she picked up the remote control for the TV and began talking into it. Her  did say she has good days and bad days.  Her  also noted that they live on the second floor of their apartment building and sometimes when she goes downstairs she cannot find her way back. Ms. Mullins was able to correctly indicate that they moved to " "a senior apartment (independent living) in [place] almost 2 years ago (will be 2 years in March 2023).    With regard to the activities of daily living, Ms. Mullins reported that that she continues to cook; however it does not taste the same as it used to.  Her  indicated that she used to be \"a gourmet cook,\" but her food is just not the same.  Ms. Mullins indicated that her  had to take over management of the bills, something that she had previously done in the past because she describes how she would often sit down and try and pay 3 bills and it would take her an hour; she had to check again and again and sometimes would catch mistakes.  Even so she indicated that she is still managing her own medications and is also managing her 's including his warfarin even though she adds, \"I am skeptical,\" indicating some concerns about this. (She was able to correctly report her current dosage of memantine and the recent change in dosage as well as the correct dosage of citalopram.) She indicated that she quit driving when she moved to [place] in March 2021.  She had previously been driving without difficulty when at their former home.    MEDICAL HISTORY:  Ms. Mullins's current problem list includes   Patient Active Problem List   Diagnosis     Calcific tendinitis of shoulder     Chronic tension-type headache, intractable     History of basal cell carcinoma     Insomnia     Irritable bowel syndrome     Right middle cranial fossa 2.2 x 1.9 x 1.8 cm meningioma     Osteopenia     Seborrheic keratosis     Presbyesophagus     Depression with anxiety     Analgesic rebound headache     Senile purpura (H)     Ms. Mullins indicated that she has been struggling with \"ice pick headaches,\" for a couple years now.  She explained, however, that they have improved since Dr. Foss increased her dose of Namenda from 5mg to 10 mg twice a day.  She noted that he also encouraged her to stop any over-the-counter medications " "and together this has reduced her headaches from several times a day to once a week. Even then, they come and go quickly.    Ms. Mullins described experiencing shaking in her hands in particular and her son added that it is worse in \"these kinds of situations,\" referring to her appointment today.    Ms. Mullins denied any history of stroke, seizure or head injury with loss of consciousness and her family agreed. She denied having tested positive for COVID or experiencing an illness concerning for COVID and her family agreed.    Ms. Mullins denied any significant changes in her vision but her family indicated that they feel her hearing is somewhat reduced.  She also indicated that food does not quite taste the same to her and even when her  says something tastes good, she can't always tell.  She denied any significant changes in her appetite and both she and her family denied any changes in her food preferences.  When asked about her sleep, she indicated its \"not too bad,\" noting that she has been doing better with melatonin in the last year.  Her  did note that sometimes she whines in her sleep and she indicated that she has strange dreams but she does not remember them.  Her  also noted that for many years now she will sometimes \"slap him,\" while she is still asleep but this happens maybe a couple times a year at most and has been going on for some time.  He otherwise denied any other unusual sleep behavior.    Ms. Mullins described a history of meningioma that she feels has been present for 3 or 4 years, (2016 per records).  However, both she and her family indicated it has been stable since it was first discovered incidentally.    Diagnostic studies:  An MRI of the brain dated November 23, 2021 revealed, \"There is a T1 and T2 hypointense nodule along the floor of the right middle cranial fossa measuring 2.2 x 1.9 x 1.8 cm in size consistent with meningioma described in the patient's history. There is " "moderate diffuse cerebral volume loss. There are numerous scattered focal and minimal patchy periventricular areas of abnormal T2 signal hyperintensity in the cerebral white matter bilaterally that are consistent with sequela of chronic small vessel ischemic disease. The ventricles and basal cisterns are within normal limits in configuration given the degree of cerebral volume loss. There is no midline shift. There are no extra-axial fluid collections. There is no evidence for stroke or acute intracranial hemorrhage.\"    Past Surgical History:   Procedure Laterality Date     COLONOSCOPY N/A 8/31/2022    Procedure: COLONOSCOPY;  Surgeon: Ray Cartwright DO;  Location: [facility]     Current medications include (per medical record):   Current Outpatient Medications:      calcium carbonate-vitamin D (LIQUID CALCIUM/VITAMIN D) 600-200 MG-UNIT CAPS, Take 1 tablet by mouth daily, Disp: , Rfl:      citalopram (CELEXA) 40 MG tablet, Take 1 tablet (40 mg) by mouth daily, Disp: 90 tablet, Rfl: 3     Cranberry-Vitamin C (AZO CRANBERRY URINARY TRACT PO), Take by mouth daily, Disp: , Rfl:      melatonin 5 MG tablet, Take 5 mg by mouth nightly as needed for sleep, Disp: , Rfl:      memantine (NAMENDA) 10 MG tablet, Take 1 tablet (10 mg) by mouth 2 times daily, Disp: 60 tablet, Rfl: 2     Omega-3 Fatty Acids (FISH OIL PO), Take 1,200 mg by mouth daily, Disp: , Rfl:      zinc 50 MG TABS, , Disp: , Rfl: .    FAMILY HISTORY:   Family medical history is significant for:   Family History   Problem Relation Age of Onset     Hypertension Mother      Myocardial Infarction Mother      Diabetes Sister      Coronary Artery Disease Sister      Hyperlipidemia Sister      Hyperlipidemia Brother      Cerebrovascular Disease No family hx of      Breast Cancer No family hx of      Colon Cancer No family hx of      Prostate Cancer No family hx of    They were unaware of any neurologic or neurodegenerative conditions in the " "family.    PSYCHIATRIC AND SUBSTANCE USE HISTORY:  With regard to her psychiatric history, Ms. Mullins denied a history of past psychiatric conditions or mental health treatment other than being on citalopram for the last few years (since at least 2017 per records).  When asked about her current mood she described it as \"not too bad.\"  She acknowledged that she is anxious today and has been worried about this appointment. She also shared that she has always had some obsessive-compulsive tendencies. She finds a lot of support from her [facility]. She denied any suicidal ideation, plan or intent or hallucinations or delusions.     With regard to substance use, Ms. Mullins denied any history of problematic alcohol use and her family agreed.  She stated that she actually stopped drinking about 30 to 40 years ago as her  was having trouble with drinking and she just stopped when he did. She did previously smoke but quit over 50 years ago.  She denied any recreational drug use.    SOCIAL HISTORY:  Ms. Mullins was born and raised in [place]. She was unaware of any complications in her mother's pregnancy with her or in her birth, or delays in reaching developmental milestones. She denied a history of early learning or attention difficulties, individualized instruction, or grade repetition. She described herself as a good student, stating how she graduated towards the top of her class.  She denied any further college but did indicate that she took some courses, but could not describe the nature of these.    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  He sold the business in 2009 and she has not worked since then.    Ms. Mullins was able to correctly indicate that she has been  to her  for 56 years and they have 1 son and 2 grandchildren.    BEHAVIORAL OBSERVATIONS:   Ms. Mullins arrived 5 minutes early and accompanied by her son and  to today's appointment. She was appropriately dressed and groomed. She was alert and engaged during the interview.  Gait was slow and slightly unsteady.  She appeared very anxious during the interview, frequently taking deep breaths and she exhibited shaking in her hands which her son described as being more noticeable when she is anxious such as situations like doctors appointments. She was pleasant and cooperative. Rate and prosody of speech were grossly normal.  Speech content was notably tangential and she was often difficult to redirect.  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 like [place]... with different money.\" No paraphasic errors were evident and speech was fluent. There was no evidence of a lora thought disorder; no hallucinations or delusions were apparent. Judgment and insight appeared fair.       Ms. Mullins appeared adequately motivated and engaged easily in the testing component of the evaluation. That said, she did appear anxious throughout the evaluation. Her performance was fully intact on embedded measures of objective effort. She attempted all tasks presented to her and worked at a steady pace.  Instructions often required repetition and rephrasing both to assist with comprehension and because she " would indicate that she did not recall what she was supposed to do.  She did often appear frustrated by challenging tasks and occasionally exhibiting a negative or self-critical response style, When she perceived tasks as especially difficult, she would often appear more anxious and upset and began to tear up.  Despite the examiner's efforts to encourage and redirect, she stayed pretty much sad and upset for extended periods. She very much struggled with several tasks, notably BD, where she expressed that she just did not know how to arrange the blocks.  Despite repeated instructions, she could not comprehend how to complete Trails B.  She very much struggled on the initial items of ROBERT but persisted. Towards the end of the evaluation she did rub her shoulders a lot and expressed that she was experience discomfort in her neck and shoulders, but still persisted with testing. Otherwise, no significant barriers to testing were observed and the following is judged to be a reasonable representation of Ms. Mullins's current cognitive strengths and weaknesses.    LIMITATIONS:  Due to circumstances that limit contact during in-person clinical visits, this assessment was conducted using face-to-face testing with the examiner wearing [employer] designated PPE and the patient wearing a face mask. The standard administration of these procedures involves in-person, face-to-face methods without PPE. The impact of applying non-standard administration methods has been evaluated only in part by scientific research. While every effort was made to simulate standard assessment practices, the diagnostic conclusions and recommendations for treatment provided in this report are being advanced with these limitations in mind.    TESTS ADMINISTERED:   Performance Validity measures, BDAE (Complex Ideational Material: CIM, Torrance Naming Test: BNT), Contreras Judgment of Line Orientation-Form V (ROBERT), Brief Visuospatial Memory Test - R Form 1  (BVMT-R), Category Fluency- AFV (CAT), Clock Drawing, Controlled Oral Word Association Test CFL (COWAT), Generalized Anxiety Disorder-7 (TELLO-7), Geriatric Depression Scale 30 (GDS), Vargas Verbal Learning Test - R Form 1 (HVLT-R), RUSSO (Sentence Repetition, Token Test), [place]ody Picture Vocabulary Test, Fourth Edition Form A (PPVT-4), Stroop Color and Word Test, Trail Making Test (TMT), WAIS-IV (Similarities, Information, Block Design, Matrix Reasoning, Digit Span), WMS-IV (Logical Memory, Visual Reproduction), WRAT-5 Word Reading (green) and WMS-III Information and Orientation.    [facility] norms were used for CIM  [facility]ANS norms were used for BNT, CAT, COWAT, ROBERT, Stroop, TMT, Tokens  (Raw scores in parentheses)    DESCRIPTIVE PERFORMANCE KEY:    Labels for tests with Normal Distributions  Score Label Standard Score %ile Rank   Exceptionally high score  > 130 > 98   Above average score 120-129 91-97   High average score 110-119 75-90   Average score  25-74   Low average score 80-89 9-24   Below average score 70-79 2-8   Exceptionally low score < 70 < 2     Labels for tests with Non-Normal Distributions  Score Label %ile Rank   Within normal expectations/ limits score (WNL) > 24   Low average score 9-24   Below average score 2-8   Exceptionally low score < 2     The following test results utilize score labels as adapted from Tao Bustillo, Samson Salinas, Keesha Jules, GAGAN Hilliard, Sudhakar Fritz Michael Westerveld & Conference Participants (2020): American Academy of Clinical Neuropsychology consensus conference statement on uniform labeling of performance test scores, The Clinical Neuropsychologist, DOI: 10.1080/[id number]    All scores contain some measure of error; scores are reported here as they are obtained by the individual (without reference to the range of error). These are meant as labels and not interpretation of performance. While other relevant  "comments regarding task performance are provided below, please see the Assessment, Impressions and Diagnostic Summary sections of this report for interpretation of the scores and the cognitive profile as a whole, including what does and does not constitute impairment.    OPTIMAL PREMORBID INTELLECT:  Optimal premorbid intellectual abilities were estimated as falling in at least the average range based on Ms. Mullins's educational and occupational histories and performance on tasks least likely to be affected by acquired brain dysfunction (i.e.,  hold tests ).    SUMMARY OF TEST RESULTS:     Orientation, Attention and Processing Speed  Mental status exam was measured as a within normal limits score for her age (13). She was oriented to person, city, time, and date and was able to correctly name the current and previous presidents.  She was unable to provide the name of the clinic location stating \"[place].\"    Performance on a measure of basic attention and working memory was assessed as an average score (23). This reflected a high average score for basic attention skills (LDF = 7) and working memory scores that ranged from a low average score (LDS = 5) to an average score (LDB = 4).    A speeded word reading task (54) and a speeded color naming task (34) were both assessed as a below average score. A simple sequencing task (181\" ) was assessed as an exceptionally low score.     Language  Sight word reading skills (58) were assessed as an average score. Verbal abstraction skills (16) were assessed as a low average score. Fund of general knowledge (5) was assessed as a below average score. Her performance on a measure of semantic fluency was measured as an exceptionally low score (13). Confrontation naming was measured as a below average to low average score (40).  Repetition of sentences was assessed as a low average score (10).  She performed well below expectation on the complex ideational material (8) and " earned an exceptionally low score.  She answered two questions incorrectly on 2 different stories.  In contrast, comprehension of increasingly complex commands was assessed as an average score (Tokens = 42) and receptive picture vocabulary was also assessed as an average score (204).  She was able to write a complete sentence without error.    Visuospatial Skills  Performance on a block construction task (5) resulted in an exceptionally low score. Performance on a pattern completion task (9) was measured as an average score. Basic visual spatial judgment was assessed as a below average score (7).  Her copy of a series of 7 geometric figures was assessed as an exceptionally low score (VR copy = 27) and her copy of a series of 6 simple geometric figures (8) was also performed below expectation.    Learning and Memory  Immediate memory for two short stories was measured as an average score (24). Delayed recall of these stories also resulted in an average score (11, 73% retention). Recognition memory was measured as a high average score (22/23). She was also administered a measure of rote auditory verbal list learning that required her to learn a series of 12 words over three trials and retain and recall them over a delay. Her initial rate of learning (2,6,6) reflected a below average score. She retained and recalled 3 words (50% retention) after the delay for a below average score for delayed recall.  Recognition memory was measured as a low average score as she correctly identified 10 of the original words and made 1 false positive error.     Immediate nonverbal memory (0,0,0) for a series of 6 geometric figures presented over three trials was measured as an exceptionally low score while delayed recall of these figures (1 detail) resulted in an exceptionally low score. Recognition memory was measured as a below average score as she correctly identified 5 of the original figures and made 3 false positive  errors.    Immediate single trial nonverbal learning for a series of geometric figures was assessed as an exceptionally low score (5). Delayed recall of these figures was measured as an exceptionally low score (0). Recognition memory was assessed as an exceptionally low score.as she correctly identified none of the original 7 figures in a multiple choice format.    Executive Functioning  Working memory skills ranged from a low average score (LDS = 5) to an average score (LDB = 4). A complex sequencing and set shifting task was discontinued due to 4 errors during the practice trial. Her performance on a measure of phonemic fluency resulted in a high average score (44). Her ability to inhibit an over-learned response was assessed as an exceptionally low score (5). Her performance on this task was notable for 2 errors.  Her first attempt at drawing a clock was notable for poor planning but she started the numbers in the correct place but ended poorly (12 ended up near the 6:00 spot).  In her second attempt, she elijah a [place] but then struggled to figure out where to place numbers.  She placed the one in the 12:00 slot a 5 at the 3:00 slot, a 12 at the 6:00 slot, and a 4 at the 9:00 slot.    Mood  On the Geriatric Depression Scale-30 (GDS), a self report measure of depressive symptomatology, she obtained a score of 11, placing her in the range of mild symptoms of depression.     On the Generalized Anxiety Disorder-7, a self-report measure of anxiety, she obtained a score of 2, placing her in the range of minimal anxiety.     EVALUATION SERVICES AND TIME:   A clinical interview/neurobehavioral status examination was conducted with the patient and documented. I thoroughly reviewed the medical record, selected the neuropsychological test battery, provided supervision to the individual who administered and scored the neuropsychological test battery, interpreted/integrated patient data and test results, engaged in clinical  decision making, treatment planning and report writing/preparation. A trained examiner/technician directly administered and scored 2+ of the neuropsychological tests and I administered the remainder of the neuropsychological tests (2 + tests). Please see below for a breakdown of time spent and the associated codes billed for these services.     Services   Time Spent  CPT Codes   Neurobehavioral Status Exam:  (e.g., face-to-face, interpretation, report) 60 minutes 1 x 96116   Neuropsychological Evaluation Services:   (e.g., integration, interpretation, treatment planning, clinical decision making, feedback)   175 minutes   1 x 96132  2 x 96133   Neuropsychological Testing by Psychologist:  (e.g., test administration, scoring, 2+ tests administered)   55 minutes   1 x 96136  1 x 96137   Neuropsychological Testing by Trained Examiner/Technician:  (e.g., test administration, scoring, 2+ tests administered)   210 minutes   1 x 96138  6 x 96139     Diagnosis:  Diagnosis: 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 Feedback of results will be provided by her referring provider at the time of an upcoming follow-up appointment.       Imani Purcell, PhD, LP, ABPP  Clinical Neuropsychologist, [id number]  Board Certified in Clinical Neuropsychology    [facility] ClinicDe[facility] [address], [address]  [place] [address]  Phone:  [phone]    The patient was seen for a neuropsychological evaluation for the purposes of diagnostic clarification and treatment planning. 150 minutes of face-to-face testing were provided by this writer. An additional 120 minutes were spent scoring and compiling test results. The patient  was reluctant to engage but persisted with encouragement. No concerns were brought to my attention. Please see Dr. Purcell's report for a detailed description of the charges and interpretation and integration of the findings.      Again, thank you for allowing me to participate in the care of your patient.        Sincerely,        Imani Purcell, PhD LP

## 2022-11-16 NOTE — LETTER
11/16/2022         RE: Irene Mullins  [address]  [facility] [address]        Dear Colleague,    Thank you for referring your patient, Irene Mullins, to the [facility] [place]. Please see a copy of my visit note below.    NEUROPSYCHOLOGY CONSULT  [facility]    NAME: Irene Mullins    YOB: 1945   AGE: 77  EDU: 12  DATE OF EVALUATION: 11/16/2022    REASON FOR REFERRAL:  Ms. Mullins is a 77 year old, right-handed, White female presenting with concerns about cognitive functioning in the context of chronic headache, IBS, anxiety and right middle cranial fossa meningioma (stable since identified in 2016) . She was referred for a neurocognitive evaluation by her neurologist, Dr. Foss from [facility] to assist with differential diagnosis and care planning.     DIAGNOSTIC SUMMARY:  Due to the current COVID-19 pandemic that limits contact during in-person clinical visits, the testing portion of this assessment was conducted using face-to-face methods with PPE worn by the examiner and a face-mask for the patient. The standard administration of these tests involves in-person, direct face-to-face methods. The full impact of applying non-standard administration methods with PPE is not fully appreciated at this time. The diagnostic conclusions and recommendations provided in this report are being advanced with caution.    With these limitations in mind, results of testing indicate that Ms. Mullins is a woman of estimated (at least) average premorbid intellectual functioning whose performance is notable for borderline impaired confrontation naming, mildly impaired fund of general knowledge, rote verbal learning, and rote verbal memory, mild to moderately impaired cognitive efficiency, moderately impaired semantic fluency, inhibition and visuospatial judgment, and severely impaired complex attention, nonverbal  learning, nonverbal memory, and comprehension of syntactically complex questions, In addition, significant variability was evident on measures of nonverbal reasoning with scores ranging from severely impaired to average. These weaknesses were evident in the context of otherwise largely intact performance on measures of basic attention, prose learning, prose memory, aspects of language (sight word reading, verbal abstraction, comprehension, repetition, receptive picture vocabulary), and aspects of executive functioning (working memory, phonemic fluency). Finally, on self-report questionnaires, she denied any significant symptoms of anxiety but did endorse mild depressive symptoms. However, behaviorally, she appeared very anxious throughout most of the evaluation.     Summary for Providers   ASSESSMENT:    Impairments identified across a variety of cognitive domains but with notable areas of deficit in nonverbal skills (reasoning, basic judgment, construction, learning, memory) and aspects of semantic retrieval (semantic fluency, confrontation naming, fund of knowledge)    Additional areas of impairment include rote verbal learning, rote verbal memory, cognitive efficiency and some measures of executive functioning    Given the range and severity of cognitive impairments, it is very difficult to discern a specific cognitive profile to fully explain her presentation    In addition, anxiety is very likely playing a role and may be exaggerating some of the above deficits    The severity and virtually global nature of the nonverbal deficits is very striking and likely explains some of her family's report of unusual behaviors (getting lost when just going downstairs, mistaking a remote control for a phone). This non-dominant (presumably right) hemisphere dysfunction could represent sequela of the right middle cranial fossa meningioma. While this has been present since at least 2016, it is unclear why symptoms are so  striking at this point and updated neuroimaging could be helpful in determining if it has grown in the last year.     The semantic retrieval deficits together with deficits in rote verbal learning and memory (as well as nonverbal learning and memory) could suggest an additional emerging Alzheimer's process. Prose learning and memory are still intact but anxiety may be exaggerating the severity of her other deficits.     I did consider a possible PPA syndrome given the severity of word finding difficulties observed during the interview, but more extensive language testing did not clearly support the presence of a PPA. Further, PPA would not be able to explain the memory deficit as easily as an an emerging Alzheimer's process.     I also considered a visual variant of Alzheimer's (I.e., posterior cortical atrophy); however, verbal skills and particularly verbal memory are generally not impacted as much this early in the course    At this time, her presentation appears to reflect multiple factors including likely impact of right sided meningioma and an emerging Alzheimer's, with anxiety likely exaggerating the severity of these deficits.     Importantly, while I feel that anxiety may be exaggerating some of her current deficits, I do not believe that mood factors alone could explain her presentation    Diagnosis: Major Neurocognitive Disorder due to Multiple Etiologies (right sided meningioma and Alzheimer's)    PLAN:    Updated neuroimaging could be considered, if deemed appropriate by her physician, to determine if the meningioma has grown (possible explanation for worsening symptoms)    Continued use of cognitive enhancing medication    Consider adjustment to mood medications to better manage anxious symptoms    Continue to receive assistance with managing finances and would also benefit from assistance with managing medications    Continue to refrain from driving    Continue to take care of herself physically and  to stay cognitively active    Re-evaluation in 18-24 months to monitor speed of progression (as I do believe that current anxiety is exaggerating the severity of her symptoms at present)    FEEDBACK:  Ms. Mullins will receive the results of this evaluation from her referring provider at the time of an upcoming follow-up appointment; however, she was encouraged to schedule a formal feedback appointment with me after that appointment should she have any additional questions.     Thank you for allowing me to participate in Ms. Mullins's care.  Please contact me with any questions regarding the content of this report.      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 Mullins would benefit from help in her daily activities particularly in terms of managing medications (i.e., having a pillbox set up for her and having someone make sure that she is taking her medications regularly and as prescribed). She will also continue to benefit from assistance with managing her finances.     Given her significant spatial deficits, Ms. Mullins is encouraged to continue to refrain from driving.     It is strongly recommended that a family member or close friend accompany Ms. Mullins to all appointments to ensure that accurate information is given to providers and instructions are followed. It will be helpful to present the information in brief, repeated segments and have her repeat back the information in her own words to ensure understanding.     If not already done, completion  of paperwork for advance directives and assignment of healthcare and financial power of  should be considered at this time.  Family Resources    It will be increasingly important for Ms. Mullins and her family to have a strong support network in place. The Alzheimer s Association (http://www.alz.org/mnnd 1-171.171.6309) has information about local support groups as well as informational materials.   Physical and Emotional Health    Ms. Mullins will continue to benefit from the use of a cognitive enhancing medication (I.e., Namenda).    Given the severity of Ms. Mullins's spatial deficits, updated neuroimaging could be considered to determine if there has been a change in the meningioma. Her PCP or Neurologist can determine if an updated MRI is appropriate.     It is important that Ms. Mullins continue to adhere to her medication treatment regimen and follow a healthy diet so as to maintain her physical health, as this can have a significant impact on her physical, emotional, and cognitive functioning.     Ms. Mullins appeared rather anxious throughout today's evaluation. She is encouraged to speak with her Neurologist or PCP to determine if an adjustment or alternative medications may help Ms. Mullins better manage her mood.     In the meantime, behavioral activation techniques such as regular exercise (under the guidance of her physician), recreational activities and regular social interaction (with proper social distancing when indicated) would likely be effective in helping Ms. Mullins to manage her mood.   Memory and Organization    Ms. Mullins is encouraged to continue to engage in stimulating activities, (i.e., reading, card games, puzzles) to keep her cognitively active.     In her daily life, Ms. Mullins will continue to benefit from the use of compensation techniques. That is, she may find it helpful to post reminder notes around the house, make lists, and carry a small calendar so that she can feel more comfortable  and confident in her ability to remember information. A daily planner could also be used as a memory book where important information is recorded and organized for future reference.     Ms. Mullins should also create a system to establish set locations for certain items (i.e., keys) such that she always knows where to put them upon entering the house and where to look when she needs them. If she would like to keep certain items out of sight (i.e., a wallet), she could set up a specific hidden place to keep items and use that same place so as to ensure she can find the required item when needed.     Ms. Mullins and her family should be aware that she is demonstrating significant deficits in spatial skills. She is very likely to get easily lost and turned around (even in familiar areas) and even to mis-perceive objects. It is strongly recommended that she not travel independently so that family and/or friends can help her compensate for these weaknesses.    When required to learn new information, Ms. Mullins does best when information is placed within a meaningful context (I.e,. a story).  Learning will be facilitated if it is made personally meaningful for her and is not presented in a rote fashion (i.e., a list of unrelated information or unconnected tasks to accomplish).     Ms. Mullins exhibits memory impairments but does benefit from cues, thus she will do best when provided with reminders to facilitate retrieval of important information.     Ms. Mullins will do best in an environment where a lot of routines are established so that she can follow a regular pattern for her daily or weekly routines.     Ms. Mullins demonstrates intact basic attention but notable memory impairments, thus, she should not be given instructions for tasks any more than a few minutes in the future.    Ms. Mullins should be aware that she may not be able to process information as quickly and efficiently as she once could. Thus she should allow herself  "extra time to complete tasks and not try and work under extreme time constraints.   Follow-up    Re-evaluation in 18-24 months is recommended. The current test data can be used as a baseline to which future comparisons can be made.    --------------------------------EXTENDED REPORT--------------------------------  Verbal consent for neuropsychological testing was received following the provision of information about the nature of the evaluation, and the opportunity to ask questions.     HISTORY OF PRESENTING PROBLEM:    Relevant History  Ms. Mullins was seen in Neurology by Dr. Foss on March 23, 2022, \"Patient developed memory difficulty in the past and had lab work that included normal B12, TSH and was started on Namenda.  She does not recall seeing a neurologist for her cognitive issues.  Part of work-up for her cognitive issues and headache included a MRI of the brain in 2021 that showed right middle cranial fossa meningioma.       According to patient about 2 to 3 years ago she started noticing difficulty with short-term memory.  She used to be an  but had difficulty paying the bills at home.  Her  took over.  She did not notice any difficulty with remote memory.  She denies any change in her personality or any hallucinations.  They recently moved into an senior apartment and she is having difficulty finding her apartment at time.  She noticed some difficulty with her balance.  She was started on Namenda 5 mg daily and has been on it for some time.\" During that visit her MoCA was well below expectation (16/30).    Of note, a 2017 mini-cog was 3/5 (missed two words) and in 2021, Ms. Mullins's clock was abnormal but she missed only one word.     Current Interview  Ms. Mullins was accompanied by her  Daniel and son John and was a variable historian. Together they provided the following information.     At the present time, Ms. Mullins reports an approximate 1-1.5 year history of concerns about her " "memory and thinking skills.  She described how she is forgetful of information in the moment, even a few minutes after she hears it.  She described forgetting intended tasks, and forgetting names both with people she has just met as well as famous actors from the past, something which she has previously been very good at remembering.  She noted that she remembers older actors from the past much better but then her  will point them out and she feels they do not look right noting that she remembers them as their younger selves rather than as they have aged.  She described word finding problems noting that she will sometimes \"call things by the wrong name.\"  She also described mixing up birthdays. She was able to describe the ongoing COVID pandemic and appropriate timeline.    Ms. Mullins's family generally agreed with the above description noting that she has been struggling with memory and word finding difficulties.  Her  explained how she does not like to answer the phone anymore as she worries that she will forget what she has been told.  She added that her  is on warfarin and often they will call with an adjustment on his medication and she worries she is going to lose track of that information.  Her  also described an incident a couple weeks ago in which the phone was ringing and she picked up the remote control for the TV and began talking into it. Her  did say she has good days and bad days.  Her  also noted that they live on the second floor of their apartment building and sometimes when she goes downstairs she cannot find her way back. Ms. Mullins was able to correctly indicate that they moved to a senior apartment (independent living) in [place] almost 2 years ago (will be 2 years in March 2023).    With regard to the activities of daily living, Ms. Mullins reported that that she continues to cook; however it does not taste the same as it used to.  Her  " "indicated that she used to be \"a gourmet cook,\" but her food is just not the same.  Ms. Mullins indicated that her  had to take over management of the bills, something that she had previously done in the past because she describes how she would often sit down and try and pay 3 bills and it would take her an hour; she had to check again and again and sometimes would catch mistakes.  Even so she indicated that she is still managing her own medications and is also managing her 's including his warfarin even though she adds, \"I am skeptical,\" indicating some concerns about this. (She was able to correctly report her current dosage of memantine and the recent change in dosage as well as the correct dosage of citalopram.) She indicated that she quit driving when she moved to [place] in March 2021.  She had previously been driving without difficulty when at their former home.    MEDICAL HISTORY:  Ms. Mullins's current problem list includes   Patient Active Problem List   Diagnosis     Calcific tendinitis of shoulder     Chronic tension-type headache, intractable     History of basal cell carcinoma     Insomnia     Irritable bowel syndrome     Right middle cranial fossa 2.2 x 1.9 x 1.8 cm meningioma     Osteopenia     Seborrheic keratosis     Presbyesophagus     Depression with anxiety     Analgesic rebound headache     Senile purpura (H)     Ms. Mullins indicated that she has been struggling with \"ice pick headaches,\" for a couple years now.  She explained, however, that they have improved since Dr. Foss increased her dose of Namenda from 5mg to 10 mg twice a day.  She noted that he also encouraged her to stop any over-the-counter medications and together this has reduced her headaches from several times a day to once a week. Even then, they come and go quickly.    Ms. Mullins described experiencing shaking in her hands in particular and her son added that it is worse in \"these kinds of situations,\" referring to " "her appointment today.    Ms. Mullins denied any history of stroke, seizure or head injury with loss of consciousness and her family agreed. She denied having tested positive for COVID or experiencing an illness concerning for COVID and her family agreed.    Ms. Mullins denied any significant changes in her vision but her family indicated that they feel her hearing is somewhat reduced.  She also indicated that food does not quite taste the same to her and even when her  says something tastes good, she can't always tell.  She denied any significant changes in her appetite and both she and her family denied any changes in her food preferences.  When asked about her sleep, she indicated its \"not too bad,\" noting that she has been doing better with melatonin in the last year.  Her  did note that sometimes she whines in her sleep and she indicated that she has strange dreams but she does not remember them.  Her  also noted that for many years now she will sometimes \"slap him,\" while she is still asleep but this happens maybe a couple times a year at most and has been going on for some time.  He otherwise denied any other unusual sleep behavior.    Ms. Mullins described a history of meningioma that she feels has been present for 3 or 4 years, (2016 per records).  However, both she and her family indicated it has been stable since it was first discovered incidentally.    Diagnostic studies:  An MRI of the brain dated November 23, 2021 revealed, \"There is a T1 and T2 hypointense nodule along the floor of the right middle cranial fossa measuring 2.2 x 1.9 x 1.8 cm in size consistent with meningioma described in the patient's history. There is moderate diffuse cerebral volume loss. There are numerous scattered focal and minimal patchy periventricular areas of abnormal T2 signal hyperintensity in the cerebral white matter bilaterally that are consistent with sequela of chronic small vessel ischemic disease. The " "ventricles and basal cisterns are within normal limits in configuration given the degree of cerebral volume loss. There is no midline shift. There are no extra-axial fluid collections. There is no evidence for stroke or acute intracranial hemorrhage.\"    Past Surgical History:   Procedure Laterality Date     COLONOSCOPY N/A 8/31/2022    Procedure: COLONOSCOPY;  Surgeon: Ray Cartwright DO;  [facility]     Current medications include (per medical record):   Current Outpatient Medications:      calcium carbonate-vitamin D (LIQUID CALCIUM/VITAMIN D) 600-200 MG-UNIT CAPS, Take 1 tablet by mouth daily, Disp: , Rfl:      citalopram (CELEXA) 40 MG tablet, Take 1 tablet (40 mg) by mouth daily, Disp: 90 tablet, Rfl: 3     Cranberry-Vitamin C (AZO CRANBERRY URINARY TRACT PO), Take by mouth daily, Disp: , Rfl:      melatonin 5 MG tablet, Take 5 mg by mouth nightly as needed for sleep, Disp: , Rfl:      memantine (NAMENDA) 10 MG tablet, Take 1 tablet (10 mg) by mouth 2 times daily, Disp: 60 tablet, Rfl: 2     Omega-3 Fatty Acids (FISH OIL PO), Take 1,200 mg by mouth daily, Disp: , Rfl:      zinc 50 MG TABS, , Disp: , Rfl: .    FAMILY HISTORY:   Family medical history is significant for:   Family History   Problem Relation Age of Onset     Hypertension Mother      Myocardial Infarction Mother      Diabetes Sister      Coronary Artery Disease Sister      Hyperlipidemia Sister      Hyperlipidemia Brother      Cerebrovascular Disease No family hx of      Breast Cancer No family hx of      Colon Cancer No family hx of      Prostate Cancer No family hx of    They were unaware of any neurologic or neurodegenerative conditions in the family.    PSYCHIATRIC AND SUBSTANCE USE HISTORY:  With regard to her psychiatric history, Ms. Mullins denied a history of past psychiatric conditions or mental health treatment other than being on citalopram for the last few years (since at least 2017 per records).  When asked about her " "current mood she described it as \"not too bad.\"  She acknowledged that she is anxious today and has been worried about this appointment. She also shared that she has always had some obsessive-compulsive tendencies. She finds a lot of support from her [facility]. She denied any suicidal ideation, plan or intent or hallucinations or delusions.     With regard to substance use, Ms. Mullins denied any history of problematic alcohol use and her family agreed.  She stated that she actually stopped drinking about 30 to 40 years ago as her  was having trouble with drinking and she just stopped when he did. She did previously smoke but quit over 50 years ago.  She denied any recreational drug use.    SOCIAL HISTORY:  Ms. Mullins was born and raised in [place]. She was unaware of any complications in her mother's pregnancy with her or in her birth, or delays in reaching developmental milestones. She denied a history of early learning or attention difficulties, individualized instruction, or grade repetition. She described herself as a good student, stating how she graduated towards the top of her class.  She denied any further college but did indicate that she took some courses, but could not describe the nature of these.    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  He sold the business in 2009 and she has not worked since then.    Ms. Mullins was able to correctly indicate that she has been  to her  for 56 years and they have 1 son and 2 grandchildren.    BEHAVIORAL " "OBSERVATIONS:   Ms. Mullins arrived 5 minutes early and accompanied by her son and  to today's appointment. She was appropriately dressed and groomed. She was alert and engaged during the interview.  Gait was slow and slightly unsteady.  She appeared very anxious during the interview, frequently taking deep breaths and she exhibited shaking in her hands which her son described as being more noticeable when she is anxious such as situations like doctors appointments. She was pleasant and cooperative. Rate and prosody of speech were grossly normal.  Speech content was notably tangential and she was often difficult to redirect.  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 like [place]... with different money.\" No paraphasic errors were evident and speech was fluent. There was no evidence of a lora thought disorder; no hallucinations or delusions were apparent. Judgment and insight appeared fair.       Ms. Mullins appeared adequately motivated and engaged easily in the testing component of the evaluation. That said, she did appear anxious throughout the evaluation. Her performance was fully intact on embedded measures of objective effort. She attempted all tasks presented to her and worked at a steady pace.  Instructions often required repetition and rephrasing both to assist with comprehension and because she would indicate that she did not recall what she was supposed to do.  She did often appear frustrated by challenging tasks and occasionally exhibiting a negative or self-critical response style, When she perceived tasks as especially difficult, she would often appear more anxious and upset " and began to tear up.  Despite the examiner's efforts to encourage and redirect, she stayed pretty much sad and upset for extended periods. She very much struggled with several tasks, notably BD, where she expressed that she just did not know how to arrange the blocks.  Despite repeated instructions, she could not comprehend how to complete Trails B.  She very much struggled on the initial items of ROBERT but persisted. Towards the end of the evaluation she did rub her shoulders a lot and expressed that she was experience discomfort in her neck and shoulders, but still persisted with testing. Otherwise, no significant barriers to testing were observed and the following is judged to be a reasonable representation of Ms. Mullins's current cognitive strengths and weaknesses.    LIMITATIONS:  Due to circumstances that limit contact during in-person clinical visits, this assessment was conducted using face-to-face testing with the examiner wearing [employer] designated PPE and the patient wearing a face mask. The standard administration of these procedures involves in-person, face-to-face methods without PPE. The impact of applying non-standard administration methods has been evaluated only in part by scientific research. While every effort was made to simulate standard assessment practices, the diagnostic conclusions and recommendations for treatment provided in this report are being advanced with these limitations in mind.    TESTS ADMINISTERED:   Performance Validity measures, BDAE (Complex Ideational Material: CIM, [place] Naming Test: BNT), [place]reras Judgment of Line Orientation-Form V (ROBERT), Brief Visuospatial Memory Test - R Form 1 (BVMT-R), Category Fluency- AFV (CAT), Clock Drawing, Controlled Oral Word Association Test CFL (COWAT), Generalized Anxiety Disorder-7 (TELLO-7), Geriatric Depression Scale 30 (GDS), Vargas Verbal Learning Test - R Form 1 (HVLT-R), RUSSO (Sentence Repetition, Token Test), [place] Picture  Vocabulary Test, Fourth Edition Form A (PPVT-4), Stroop Color and Word Test, Trail Making Test (TMT), WAIS-IV (Similarities, Information, Block Design, Matrix Reasoning, Digit Span), WMS-IV (Logical Memory, Visual Reproduction), WRAT-5 Word Reading (green) and WMS-III Information and Orientation.    Jenn norms were used for CIM  MOANS norms were used for BNT, CAT, COWAT, ROBERT, Stroop, TMT, Tokens  (Raw scores in parentheses)    DESCRIPTIVE PERFORMANCE KEY:    Labels for tests with Normal Distributions  Score Label Standard Score %ile Rank   Exceptionally high score  > 130 > 98   Above average score 120-129 91-97   High average score 110-119 75-90   Average score  25-74   Low average score 80-89 9-24   Below average score 70-79 2-8   Exceptionally low score < 70 < 2     Labels for tests with Non-Normal Distributions  Score Label %ile Rank   Within normal expectations/ limits score (WNL) > 24   Low average score 9-24   Below average score 2-8   Exceptionally low score < 2     The following test results utilize score labels as adapted from Tao Bustillo, Samson Salinas, Keesha Jules, GAGAN Hilliard, Sheree Bowser, Sudhakar Blas, Bobby Todd & Conference Participants (2020): American Academy of Clinical Neuropsychology consensus conference statement on uniform labeling of performance test scores, The Clinical Neuropsychologist, DOI: 10.1080/[email or web address]    All scores contain some measure of error; scores are reported here as they are obtained by the individual (without reference to the range of error). These are meant as labels and not interpretation of performance. While other relevant comments regarding task performance are provided below, please see the Assessment, Impressions and Diagnostic Summary sections of this report for interpretation of the scores and the cognitive profile as a whole, including what does and does not constitute impairment.    OPTIMAL PREMORBID  "INTELLECT:  Optimal premorbid intellectual abilities were estimated as falling in at least the average range based on Ms. Mullins's educational and occupational histories and performance on tasks least likely to be affected by acquired brain dysfunction (i.e.,  hold tests ).    SUMMARY OF TEST RESULTS:     Orientation, Attention and Processing Speed  Mental status exam was measured as a within normal limits score for her age (13). She was oriented to person, city, time, and date and was able to correctly name the current and previous presidents.  She was unable to provide the name of the clinic location stating \"[place].\"    Performance on a measure of basic attention and working memory was assessed as an average score (23). This reflected a high average score for basic attention skills (LDF = 7) and working memory scores that ranged from a low average score (LDS = 5) to an average score (LDB = 4).    A speeded word reading task (54) and a speeded color naming task (34) were both assessed as a below average score. A simple sequencing task (181\" ) was assessed as an exceptionally low score.     Language  Sight word reading skills (58) were assessed as an average score. Verbal abstraction skills (16) were assessed as a low average score. Fund of general knowledge (5) was assessed as a below average score. Her performance on a measure of semantic fluency was measured as an exceptionally low score (13). Confrontation naming was measured as a below average to low average score (40).  Repetition of sentences was assessed as a low average score (10).  She performed well below expectation on the complex ideational material (8) and earned an exceptionally low score.  She answered two questions incorrectly on 2 different stories.  In contrast, comprehension of increasingly complex commands was assessed as an average score (Tokens = 42) and receptive picture vocabulary was also assessed as an average score (204).  She was " able to write a complete sentence without error.    Visuospatial Skills  Performance on a block construction task (5) resulted in an exceptionally low score. Performance on a pattern completion task (9) was measured as an average score. Basic visual spatial judgment was assessed as a below average score (7).  Her copy of a series of 7 geometric figures was assessed as an exceptionally low score (VR copy = 27) and her copy of a series of 6 simple geometric figures (8) was also performed below expectation.    Learning and Memory  Immediate memory for two short stories was measured as an average score (24). Delayed recall of these stories also resulted in an average score (11, 73% retention). Recognition memory was measured as a high average score (22/23). She was also administered a measure of rote auditory verbal list learning that required her to learn a series of 12 words over three trials and retain and recall them over a delay. Her initial rate of learning (2,6,6) reflected a below average score. She retained and recalled 3 words (50% retention) after the delay for a below average score for delayed recall.  Recognition memory was measured as a low average score as she correctly identified 10 of the original words and made 1 false positive error.     Immediate nonverbal memory (0,0,0) for a series of 6 geometric figures presented over three trials was measured as an exceptionally low score while delayed recall of these figures (1 detail) resulted in an exceptionally low score. Recognition memory was measured as a below average score as she correctly identified 5 of the original figures and made 3 false positive errors.    Immediate single trial nonverbal learning for a series of geometric figures was assessed as an exceptionally low score (5). Delayed recall of these figures was measured as an exceptionally low score (0). Recognition memory was assessed as an exceptionally low score.as she correctly identified  none of the original 7 figures in a multiple choice format.    Executive Functioning  Working memory skills ranged from a low average score (LDS = 5) to an average score (LDB = 4). A complex sequencing and set shifting task was discontinued due to 4 errors during the practice trial. Her performance on a measure of phonemic fluency resulted in a high average score (44). Her ability to inhibit an over-learned response was assessed as an exceptionally low score (5). Her performance on this task was notable for 2 errors.  Her first attempt at drawing a clock was notable for poor planning but she started the numbers in the correct place but ended poorly (12 ended up near the 6:00 spot).  In her second attempt, she elijah a [place] but then struggled to figure out where to place numbers.  She placed the one in the 12:00 slot a 5 at the 3:00 slot, a 12 at the 6:00 slot, and a 4 at the 9:00 slot.    Mood  On the Geriatric Depression Scale-30 (GDS), a self report measure of depressive symptomatology, she obtained a score of 11, placing her in the range of mild symptoms of depression.     On the Generalized Anxiety Disorder-7, a self-report measure of anxiety, she obtained a score of 2, placing her in the range of minimal anxiety.     EVALUATION SERVICES AND TIME:   A clinical interview/neurobehavioral status examination was conducted with the patient and documented. I thoroughly reviewed the medical record, selected the neuropsychological test battery, provided supervision to the individual who administered and scored the neuropsychological test battery, interpreted/integrated patient data and test results, engaged in clinical decision making, treatment planning and report writing/preparation. A trained examiner/technician directly administered and scored 2+ of the neuropsychological tests and I administered the remainder of the neuropsychological tests (2 + tests). Please see below for a breakdown of time spent and the  associated codes billed for these services.     Services   Time Spent  CPT Codes   Neurobehavioral Status Exam:  (e.g., face-to-face, interpretation, report) 60 minutes 1 x 96116   Neuropsychological Evaluation Services:   (e.g., integration, interpretation, treatment planning, clinical decision making, feedback)   175 minutes   1 x 96132  2 x 96133   Neuropsychological Testing by Psychologist:  (e.g., test administration, scoring, 2+ tests administered)   55 minutes   1 x 96136  1 x 96137   Neuropsychological Testing by Trained Examiner/Technician:  (e.g., test administration, scoring, 2+ tests administered)   210 minutes   1 x 96138  6 x 96139     Diagnosis:  Diagnosis: 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 Feedback of results will be provided by her referring provider at the time of an upcoming follow-up appointment.       Imani Purcell, PhD, LP, ABPP  Clinical Neuropsychologist, [id number]  Board Certified in Clinical Neuropsychology    [facility] [address] , [address]  [place] [address]  Phone:  [phone]    The patient was seen for a neuropsychological evaluation for the purposes of diagnostic clarification and treatment planning. 150 minutes of face-to-face testing were provided by this writer. An additional 120 minutes were spent scoring and compiling test results. The patient was reluctant to engage but persisted with encouragement. No concerns were brought to my attention. Please see Dr. Purcell's report for a detailed description of the charges and interpretation and integration of the findings.      Again, thank you for allowing me to participate in the care of your  patient.        Sincerely,        Imani Purcell, PhD LP

## 2022-11-17 NOTE — PROGRESS NOTES
The patient was seen for a neuropsychological evaluation for the purposes of diagnostic clarification and treatment planning. 150 minutes of face-to-face testing were provided by this writer. An additional 120 minutes were spent scoring and compiling test results. The patient was reluctant to engage but persisted with encouragement. No concerns were brought to my attention. Please see Dr. Purcell's report for a detailed description of the charges and interpretation and integration of the findings.

## 2022-11-26 DIAGNOSIS — R41.9 NEUROCOGNITIVE DISORDER: ICD-10-CM

## 2022-11-28 ENCOUNTER — TELEPHONE (OUTPATIENT)
Dept: NEUROLOGY | Facility: CLINIC | Age: 77
End: 2022-11-28

## 2022-11-28 RX ORDER — MEMANTINE HYDROCHLORIDE 10 MG/1
10 TABLET ORAL 2 TIMES DAILY
Qty: 60 TABLET | Refills: 0 | Status: SHIPPED | OUTPATIENT
Start: 2022-11-28 | End: 2022-11-30

## 2022-11-28 NOTE — TELEPHONE ENCOUNTER
Refill request for: memantine (NAMENDA) 10 MG tablet  Directions: Take 1 tablet (10 mg) by mouth 2 times daily    LOV: 3/23/22  NOV: 11/30/22    30 day supply with 0 refills Medication T'd for review and signature  Celeste Porter CMA on 11/28/2022 at 3:04 PM

## 2022-11-28 NOTE — TELEPHONE ENCOUNTER
[facility] Call Center    Phone Message    May a detailed message be left on voicemail: yes     Reason for Call: Other:  calling in to find out if they can change to confrence visit as weather will be changing and it is along drive  For them to make in bad weather.   Please reach out to . Thank you.   Action Taken: Message routed to:  [facility] ([facility]): none    Travel Screening: Not Applicable

## 2022-11-29 PROBLEM — G30.9 MAJOR NEUROCOGNITIVE DISORDER DUE TO ALZHEIMER'S DISEASE (H): Status: ACTIVE | Noted: 2022-11-29

## 2022-11-29 PROBLEM — F02.80 MAJOR NEUROCOGNITIVE DISORDER DUE TO ALZHEIMER'S DISEASE (H): Status: ACTIVE | Noted: 2022-11-29

## 2022-11-30 ENCOUNTER — VIRTUAL VISIT (OUTPATIENT)
Dept: NEUROLOGY | Facility: CLINIC | Age: 77
End: 2022-11-30
Payer: COMMERCIAL

## 2022-11-30 VITALS — HEIGHT: 63 IN | BODY MASS INDEX: 24.27 KG/M2 | WEIGHT: 137 LBS

## 2022-11-30 DIAGNOSIS — G44.221 CHRONIC TENSION-TYPE HEADACHE, INTRACTABLE: Primary | ICD-10-CM

## 2022-11-30 DIAGNOSIS — D32.0 BENIGN MENINGIOMA OF BRAIN (H): ICD-10-CM

## 2022-11-30 DIAGNOSIS — G30.9 MAJOR NEUROCOGNITIVE DISORDER DUE TO ALZHEIMER'S DISEASE (H): ICD-10-CM

## 2022-11-30 DIAGNOSIS — F02.80 MAJOR NEUROCOGNITIVE DISORDER DUE TO ALZHEIMER'S DISEASE (H): ICD-10-CM

## 2022-11-30 PROCEDURE — 99214 OFFICE O/P EST MOD 30 MIN: CPT | Mod: 95 | Performed by: PSYCHIATRY & NEUROLOGY

## 2022-11-30 RX ORDER — MEMANTINE HYDROCHLORIDE 10 MG/1
10 TABLET ORAL 2 TIMES DAILY
Qty: 60 TABLET | Refills: 11 | Status: SHIPPED | OUTPATIENT
Start: 2022-11-30 | End: 2023-11-17

## 2022-11-30 NOTE — NURSING NOTE
Chief Complaint   Patient presents with     Neurocognitive disorder; likely late onset Alzheimer's christine     Neuropsych and lab results      Video Visit     Send direct link via Epic to [phone]     Celeste Porter CMA on 11/30/2022 at 10:22 AM

## 2022-11-30 NOTE — PROGRESS NOTES
NEUROLOGY FOLLOW UP VISIT  NOTE       [facility]UROLOG[facility] [place]  [address] [place] [address]  Tel: [phone]  Fax: [phone]  [email or web address]     Irene Mullins,  1945, MRN [id number]  PCP: Lynda Bal  Date: 2022      ASSESSMENT & PLAN     Visit Diagnosis  1.  Major neurocognitive disorder due to Alzheimer's disease (H)  2. Chronic tension-type headache, intractable  3. Benign meningioma of brain (H)     Major neurocognitive disorder due to Alzheimer's dementia  77-year-old female with history of chronic tension type headache, depression, IBS who was initially evaluated on 3/23/2022 for progressive cognitive decline.  She scored 16/30 on MoCA that suggested a major neurocognitive disorder.  Subsequent work-up included MRI brain that showed incidental right middle cranial fossa meningioma.  Lab work for common causes of cognitive decline was normal.  I have informed her that we are dealing with major neurocognitive disorder due to Alzheimer's dementia.  I have recommended:    1.  Continue Namenda 10 mg twice daily.  Previously she did not tolerate Aricept  2.  Check hepatic profile  3.  Patient was told to remain socially active, do crossword puzzle and stimulating activities  4.  Follow-up will be in 1 year    Thank you again for this referral, please feel free to contact me if you have any questions.    Benito Foss MD  [facility]  (Former[facility], [facility]     HISTORY OF PRESENT ILLNESS     Patient is 77-year-old female with history of chronic tension type headache, depression, IBS, right middle cranial fossa meningioma who was initially evaluated on 3/23/2022 for her progressive cognitive decline.  She scored 16/30 on MoCA and her symptoms strongly suggested the diagnosis of Alzheimer's dementia.  She had MRI of the brain that showed an incidental right middle cranial fossa meningioma.  She had  neuropsychological evaluation that suggested major neurocognitive disorder due to multiple etiology.  Lab work for common causes of cognitive decline included normal vitamin B1, RPR, alpha-tocopherol, methylmalonic acid level, homocystine, folate.  Previously she was started on Namenda 5 mg daily by her primary care physician dose was increased to 10 mg twice daily.  She denies any change in her symptoms.  She does feel fatigued and is somewhat dejected to learn she has Alzheimer's dementia.  She has stopped driving.    She also has history of headaches for which she was evaluated in the past. She describes it almost daily headaches during which she gets a sharp shooting pain in different part of the brain.  She was diagnosed with icepick headaches and admits she has been taking Tylenol almost on a daily basis in the morning and evening for long time.  She clearly suffers from depression and anxiety which likely is the underlying cause for her chronic tension type headache resulting in ice pick headaches and now likely have evolved into analgesic rebound headache as she is taking Tylenol almost on a regular basis.  She was told to avoid over-the-counter pain medication, continue Celexa.     PROBLEM LIST   Patient Active Problem List   Diagnosis Code     Calcific tendinitis of shoulder M75.30     Chronic tension-type headache, intractable G44.221     History of basal cell carcinoma Z85.828     Insomnia G47.00     Irritable bowel syndrome K58.9     Right middle cranial fossa 2.2 x 1.9 x 1.8 cm meningioma D32.0     Osteopenia M85.80     Seborrheic keratosis L82.1     Presbyesophagus K22.89     Depression with anxiety F41.8     Analgesic rebound headache G44.40, T39.95XA     Senile purpura (H) D69.2     Major neurocognitive disorder due to Alzheimer's disease (H) G30.9, F02.80         PAST MEDICAL & SURGICAL HISTORY     Past Medical History:   Patient  has no past medical history on file.    Surgical History:  She  has  "a past surgical history that includes Colonoscopy (N/A, 8/31/2022).     SOCIAL HISTORY     Reviewed, and she  reports that she has quit smoking. She has never used smokeless tobacco. She reports that she does not currently use alcohol.     FAMILY HISTORY     Reviewed, and family history includes Coronary Artery Disease in her sister; Diabetes in her sister; Hyperlipidemia in her brother and sister; Hypertension in her mother; Myocardial Infarction in her mother.     ALLERGIES     Allergies   Allergen Reactions     Oxycodone-Acetaminophen      Other reaction(s): Dizziness, GI Upset     Sulfamethoxazole-Trimethoprim      Other reaction(s): Other - Describe In Comment Field  Hot flashes, face and arms red for 1 hour after taking medication     Tramadol Nausea and Vomiting     Valproic Acid      Other reaction(s): Other - Describe In Comment Field  Hair loss, elevated liver enzymes         REVIEW OF SYSTEMS     A 12 point review of system was performed and was negative except as outlined in the history of present illness.     HOME MEDICATIONS     Current Outpatient Rx   Medication Sig Dispense Refill     citalopram (CELEXA) 40 MG tablet Take 1 tablet (40 mg) by mouth daily 90 tablet 3     melatonin 5 MG tablet Take 5 mg by mouth nightly as needed for sleep       memantine (NAMENDA) 10 MG tablet Take 1 tablet (10 mg) by mouth 2 times daily 60 tablet 11     Cranberry-Vitamin C (AZO CRANBERRY URINARY TRACT PO) Take by mouth daily           PHYSICAL EXAM     Vital signs  Ht 1.6 m (5' 3\")   Wt 62.1 kg (137 lb)   BMI 24.27 kg/m      Weight:   137 lbs 0 oz    MOCA Score 16   Administered by:  Celeste Porter CMA      Patient is alert and oriented appears depressed.  Vital signs were reviewed and are documented in electronic medical record.  Neck supple.  Neurologically speech was normal.  Cranial nerves II through XII are intact.  Motor strength 5/5 reflexes 2+ toes downgoing.  She has dysmetria on finger-nose testing.  " Intact sensation.  She has a wide-based gait       PERTINENT DIAGNOSTIC STUDIES     Following studies were reviewed:     MRI BRAIN 11/23/2021  1. 2.2 x 1.9 x 1.8 cm extra-axial nodule along the floor of the right  middle cranial fossa consistent with the meningioma described in the  patient's history.  2. Diffuse cerebral volume loss and cerebral white matter changes  consistent with chronic small vessel ischemic disease    NEUROPSYCHOLOGICAL EVALUATION 11/16/2022  Diagnosis: Major Neurocognitive Disorder due to Multiple Etiologies (right sided meningioma and Alzheimer's)     RECOMMENDATIONS:  Driving and Activities of Daily Living  ? Ms. Mullins would benefit from help in her daily activities particularly in terms of managing medications (i.e., having a pillbox set up for her and having someone make sure that she is taking her medications regularly and as prescribed). She will also continue to benefit from assistance with managing her finances.   ? Given her significant spatial deficits, Ms. Mullins is encouraged to continue to refrain from driving.   ? It is strongly recommended that a family member or close friend accompany Ms. Mullins to all appointments to ensure that accurate information is given to providers and instructions are followed. It will be helpful to present the information in brief, repeated segments and have her repeat back the information in her own words to ensure understanding.     If not already done, completion of paperwork for advance directives and assignment of healthcare and financial power of  should be considered at this time.  Family Resources    It will be increasingly important for Ms. Mullins and her family to have a strong support network in place. The Alzheimer s Association (http://www.alz.org/[place]nd 5-527-173-0469) has information about local support groups as well as informational materials.   Physical and Emotional Health    Ms. Mullins will continue to benefit from the use of a  cognitive enhancing medication (I.e., Namenda).    Given the severity of Ms. Mullins's spatial deficits, updated neuroimaging could be considered to determine if there has been a change in the meningioma. Her PCP or Neurologist can determine if an updated MRI is appropriate.     It is important that Ms. Mullins continue to adhere to her medication treatment regimen and follow a healthy diet so as to maintain her physical health, as this can have a significant impact on her physical, emotional, and cognitive functioning.     Ms. Mullins appeared rather anxious throughout today's evaluation. She is encouraged to speak with her Neurologist or PCP to determine if an adjustment or alternative medications may help Ms. Mullins better manage her mood.     In the meantime, behavioral activation techniques such as regular exercise (under the guidance of her physician), recreational activities and regular social interaction (with proper social distancing when indicated) would likely be effective in helping Ms. Mullins to manage her mood.   Memory and Organization    Ms. Mullins is encouraged to continue to engage in stimulating activities, (i.e., reading, card games, puzzles) to keep her cognitively active.     In her daily life, Ms. Mullins will continue to benefit from the use of compensation techniques. That is, she may find it helpful to post reminder notes around the house, make lists, and carry a small calendar so that she can feel more comfortable and confident in her ability to remember information. A daily planner could also be used as a memory book where important information is recorded and organized for future reference.     Ms. Mullins should also create a system to establish set locations for certain items (i.e., keys) such that she always knows where to put them upon entering the house and where to look when she needs them. If she would like to keep certain items out of sight (i.e., a wallet), she could set up a specific hidden  place to keep items and use that same place so as to ensure she can find the required item when needed.     Ms. Mullins and her family should be aware that she is demonstrating significant deficits in spatial skills. She is very likely to get easily lost and turned around (even in familiar areas) and even to mis-perceive objects. It is strongly recommended that she not travel independently so that family and/or friends can help her compensate for these weaknesses.    When required to learn new information, Ms. Mullins does best when information is placed within a meaningful context (I.e,. a story).  Learning will be facilitated if it is made personally meaningful for her and is not presented in a rote fashion (i.e., a list of unrelated information or unconnected tasks to accomplish).     Ms. Mullins exhibits memory impairments but does benefit from cues, thus she will do best when provided with reminders to facilitate retrieval of important information.     Ms. Mullins will do best in an environment where a lot of routines are established so that she can follow a regular pattern for her daily or weekly routines.     Ms. Mullins demonstrates intact basic attention but notable memory impairments, thus, she should not be given instructions for tasks any more than a few minutes in the future.    Ms. Mullins should be aware that she may not be able to process information as quickly and efficiently as she once could. Thus she should allow herself extra time to complete tasks and not try and work under extreme time constraints.   Follow-up    Re-evaluation in 18-24 months is recommended. The current test data can be used as a baseline to which future comparisons can be made.       PERTINENT LABS  Following labs were reviewed:   Latest Reference Range & Units 04/01/22 10:29 04/18/22 15:45   Folate >=5.4 ng/mL 12.3    Homocysteine umol/L 4.0 - 12.0 umol/L 10.0    Methylmalonic Acid 0.00 - 0.40 umol/L 0.27    Vitamin B1 Whole Blood Level  70 - 180 nmol/L  104   Vitamin E 5.5 - 18.0 mg/L 12.3    Vitamin E Gamma 0.0 - 6.0 mg/L 0.6          VIDEO VISIT DETAILS  Patient is being evaluated via a billable video visit.   Patient would like the video invitation sent by: [x]E-mail  [][facility]   Type of service: Video Visit  Video Start Time: 10:45 AM  Video End Time (time video stopped): 10:55 AM  Originating Location (Patient Location): Patient's Home  Distant Location (provider location): [facility] [place]   Mode of Communication: Video Conference via [][employer] [x][employer]     Total time spent for face to face visit, reviewing labs/imaging studies, counseling and coordination of care was: 30 Minutes spent on the date of the encounter doing chart review, review of outside records, review of test results, interpretation of tests, patient visit, documentation and discussion with family       This note was dictated using voice recognition software.  Any grammatical or context distortions are unintentional and inherent to the software.    Orders Placed This Encounter   Procedures     Hepatic function panel      New Prescriptions    No medications on file     Modified Medications    Modified Medication Previous Medication    MEMANTINE (NAMENDA) 10 MG TABLET memantine (NAMENDA) 10 MG tablet       Take 1 tablet (10 mg) by mouth 2 times daily    Take 1 tablet (10 mg) by mouth 2 times daily

## 2022-11-30 NOTE — LETTER
2022         RE: Irene Mullins  [address]  [facility] [address]        Dear Colleague,    Thank you for referring your patient, Irene Mullins, to the [facility] [place]. Please see a copy of my visit note below.    NEUROLOGY FOLLOW UP VISIT  NOTE       [facility] [place]  [address] [place] [address]  Tel: [phone]  Fax: [phone]  [email or web address]     Irene Mullins,  1945, MRN [id number]  PCP: Lynda Bal  Date: 2022      ASSESSMENT & PLAN     Visit Diagnosis  1.  Major neurocognitive disorder due to Alzheimer's disease (H)  2. Chronic tension-type headache, intractable  3. Benign meningioma of brain (H)     Major neurocognitive disorder due to Alzheimer's dementia  77-year-old female with history of chronic tension type headache, depression, IBS who was initially evaluated on 3/23/2022 for progressive cognitive decline.  She scored 16/30 on MoCA that suggested a major neurocognitive disorder.  Subsequent work-up included MRI brain that showed incidental right middle cranial fossa meningioma.  Lab work for common causes of cognitive decline was normal.  I have informed her that we are dealing with major neurocognitive disorder due to Alzheimer's dementia.  I have recommended:    1.  Continue Namenda 10 mg twice daily.  Previously she did not tolerate Aricept  2.  Check hepatic profile  3.  Patient was told to remain socially active, do crossword puzzle and stimulating activities  4.  Follow-up will be in 1 year    Thank you again for this referral, please feel free to contact me if you have any questions.    Benito Foss MD  [facility]  (Formerly, [facility]     HISTORY OF PRESENT ILLNESS     Patient is 77-year-old female with history of chronic tension type headache, depression, IBS, right middle cranial fossa meningioma who was initially  evaluated on 3/23/2022 for her progressive cognitive decline.  She scored 16/30 on MoCA and her symptoms strongly suggested the diagnosis of Alzheimer's dementia.  She had MRI of the brain that showed an incidental right middle cranial fossa meningioma.  She had neuropsychological evaluation that suggested major neurocognitive disorder due to multiple etiology.  Lab work for common causes of cognitive decline included normal vitamin B1, RPR, alpha-tocopherol, methylmalonic acid level, homocystine, folate.  Previously she was started on Namenda 5 mg daily by her primary care physician dose was increased to 10 mg twice daily.  She denies any change in her symptoms.  She does feel fatigued and is somewhat dejected to learn she has Alzheimer's dementia.  She has stopped driving.    She also has history of headaches for which she was evaluated in the past. She describes it almost daily headaches during which she gets a sharp shooting pain in different part of the brain.  She was diagnosed with icepick headaches and admits she has been taking Tylenol almost on a daily basis in the morning and evening for long time.  She clearly suffers from depression and anxiety which likely is the underlying cause for her chronic tension type headache resulting in ice pick headaches and now likely have evolved into analgesic rebound headache as she is taking Tylenol almost on a regular basis.  She was told to avoid over-the-counter pain medication, continue Celexa.     PROBLEM LIST   Patient Active Problem List   Diagnosis Code     Calcific tendinitis of shoulder M75.30     Chronic tension-type headache, intractable G44.221     History of basal cell carcinoma Z85.828     Insomnia G47.00     Irritable bowel syndrome K58.9     Right middle cranial fossa 2.2 x 1.9 x 1.8 cm meningioma D32.0     Osteopenia M85.80     Seborrheic keratosis L82.1     Presbyesophagus K22.89     Depression with anxiety F41.8     Analgesic rebound headache G44.40,  "T39.95XA     Senile purpura (H) D69.2     Major neurocognitive disorder due to Alzheimer's disease (H) G30.9, F02.80         PAST MEDICAL & SURGICAL HISTORY     Past Medical History:   Patient  has no past medical history on file.    Surgical History:  She  has a past surgical history that includes Colonoscopy (N/A, 8/31/2022).     SOCIAL HISTORY     Reviewed, and she  reports that she has quit smoking. She has never used smokeless tobacco. She reports that she does not currently use alcohol.     FAMILY HISTORY     Reviewed, and family history includes Coronary Artery Disease in her sister; Diabetes in her sister; Hyperlipidemia in her brother and sister; Hypertension in her mother; Myocardial Infarction in her mother.     ALLERGIES     Allergies   Allergen Reactions     Oxycodone-Acetaminophen      Other reaction(s): Dizziness, GI Upset     Sulfamethoxazole-Trimethoprim      Other reaction(s): Other - Describe In Comment Field  Hot flashes, face and arms red for 1 hour after taking medication     Tramadol Nausea and Vomiting     Valproic Acid      Other reaction(s): Other - Describe In Comment Field  Hair loss, elevated liver enzymes         REVIEW OF SYSTEMS     A 12 point review of system was performed and was negative except as outlined in the history of present illness.     HOME MEDICATIONS     Current Outpatient Rx   Medication Sig Dispense Refill     citalopram (CELEXA) 40 MG tablet Take 1 tablet (40 mg) by mouth daily 90 tablet 3     melatonin 5 MG tablet Take 5 mg by mouth nightly as needed for sleep       memantine (NAMENDA) 10 MG tablet Take 1 tablet (10 mg) by mouth 2 times daily 60 tablet 11     Cranberry-Vitamin C (AZO CRANBERRY URINARY TRACT PO) Take by mouth daily           PHYSICAL EXAM     Vital signs  Ht 1.6 m (5' 3\")   Wt 62.1 kg (137 lb)   BMI 24.27 kg/m      Weight:   137 lbs 0 oz    MOCA Score 16   Administered by:  Celeste Porter CMA      Patient is alert and oriented appears depressed.  " Vital signs were reviewed and are documented in electronic medical record.  Neck supple.  Neurologically speech was normal.  Cranial nerves II through XII are intact.  Motor strength 5/5 reflexes 2+ toes downgoing.  She has dysmetria on finger-nose testing.  Intact sensation.  She has a wide-based gait       PERTINENT DIAGNOSTIC STUDIES     Following studies were reviewed:     MRI BRAIN 11/23/2021  1. 2.2 x 1.9 x 1.8 cm extra-axial nodule along the floor of the right  middle cranial fossa consistent with the meningioma described in the  patient's history.  2. Diffuse cerebral volume loss and cerebral white matter changes  consistent with chronic small vessel ischemic disease    NEUROPSYCHOLOGICAL EVALUATION 11/16/2022  Diagnosis: Major Neurocognitive Disorder due to Multiple Etiologies (right sided meningioma and Alzheimer's)     RECOMMENDATIONS:  Driving and Activities of Daily Living  ? Ms. Mullins would benefit from help in her daily activities particularly in terms of managing medications (i.e., having a pillbox set up for her and having someone make sure that she is taking her medications regularly and as prescribed). She will also continue to benefit from assistance with managing her finances.   ? Given her significant spatial deficits, Ms. Mullins is encouraged to continue to refrain from driving.   ? It is strongly recommended that a family member or close friend accompany Ms. Mullins to all appointments to ensure that accurate information is given to providers and instructions are followed. It will be helpful to present the information in brief, repeated segments and have her repeat back the information in her own words to ensure understanding.     If not already done, completion of paperwork for advance directives and assignment of healthcare and financial power of  should be considered at this time.  Family Resources    It will be increasingly important for Ms. Mullins and her family to have a strong  support network in place. The Alzheimer s Association (http://www.alz.org/mnnd 1-352.940.1111) has information about local support groups as well as informational materials.   Physical and Emotional Health    Ms. Mullins will continue to benefit from the use of a cognitive enhancing medication (I.e., Namenda).    Given the severity of Ms. Mullins's spatial deficits, updated neuroimaging could be considered to determine if there has been a change in the meningioma. Her PCP or Neurologist can determine if an updated MRI is appropriate.     It is important that Ms. Mullins continue to adhere to her medication treatment regimen and follow a healthy diet so as to maintain her physical health, as this can have a significant impact on her physical, emotional, and cognitive functioning.     Ms. Mullins appeared rather anxious throughout today's evaluation. She is encouraged to speak with her Neurologist or PCP to determine if an adjustment or alternative medications may help Ms. Mullins better manage her mood.     In the meantime, behavioral activation techniques such as regular exercise (under the guidance of her physician), recreational activities and regular social interaction (with proper social distancing when indicated) would likely be effective in helping Ms. Mullins to manage her mood.   Memory and Organization    Ms. Mullins is encouraged to continue to engage in stimulating activities, (i.e., reading, card games, puzzles) to keep her cognitively active.     In her daily life, Ms. Mullins will continue to benefit from the use of compensation techniques. That is, she may find it helpful to post reminder notes around the house, make lists, and carry a small calendar so that she can feel more comfortable and confident in her ability to remember information. A daily planner could also be used as a memory book where important information is recorded and organized for future reference.     Ms. Mullins should also create a system to  establish set locations for certain items (i.e., keys) such that she always knows where to put them upon entering the house and where to look when she needs them. If she would like to keep certain items out of sight (i.e., a wallet), she could set up a specific hidden place to keep items and use that same place so as to ensure she can find the required item when needed.     Ms. Mullins and her family should be aware that she is demonstrating significant deficits in spatial skills. She is very likely to get easily lost and turned around (even in familiar areas) and even to mis-perceive objects. It is strongly recommended that she not travel independently so that family and/or friends can help her compensate for these weaknesses.    When required to learn new information, Ms. Mullins does best when information is placed within a meaningful context (I.e,. a story).  Learning will be facilitated if it is made personally meaningful for her and is not presented in a rote fashion (i.e., a list of unrelated information or unconnected tasks to accomplish).     Ms. Mullins exhibits memory impairments but does benefit from cues, thus she will do best when provided with reminders to facilitate retrieval of important information.     Ms. Mullins will do best in an environment where a lot of routines are established so that she can follow a regular pattern for her daily or weekly routines.     Ms. Mullins demonstrates intact basic attention but notable memory impairments, thus, she should not be given instructions for tasks any more than a few minutes in the future.    Ms. Mullins should be aware that she may not be able to process information as quickly and efficiently as she once could. Thus she should allow herself extra time to complete tasks and not try and work under extreme time constraints.   Follow-up    Re-evaluation in 18-24 months is recommended. The current test data can be used as a baseline to which future comparisons can be  made.       PERTINENT LABS  Following labs were reviewed:   Latest Reference Range & Units 04/01/22 10:29 04/18/22 15:45   Folate >=5.4 ng/mL 12.3    Homocysteine umol/L 4.0 - 12.0 umol/L 10.0    Methylmalonic Acid 0.00 - 0.40 umol/L 0.27    Vitamin B1 Whole Blood Level 70 - 180 nmol/L  104   Vitamin E 5.5 - 18.0 mg/L 12.3    Vitamin E Gamma 0.0 - 6.0 mg/L 0.6          VIDEO VISIT DETAILS  Patient is being evaluated via a billable video visit.   Patient would like the video invitation sent by: [x]E-mail  []Plink   Type of service: Video Visit  Video Start Time: 10:45 AM  Video End Time (time video stopped): 10:55 AM  Originating Location (Patient Location): Patient's Home  Distant Location (provider location): [facility] [place]   Mode of Communication: Video Conference via []Live On The Go [x]Doximity     Total time spent for face to face visit, reviewing labs/imaging studies, counseling and coordination of care was: 30 Minutes spent on the date of the encounter doing chart review, review of outside records, review of test results, interpretation of tests, patient visit, documentation and discussion with family       This note was dictated using voice recognition software.  Any grammatical or context distortions are unintentional and inherent to the software.    Orders Placed This Encounter   Procedures     Hepatic function panel      New Prescriptions    No medications on file     Modified Medications    Modified Medication Previous Medication    MEMANTINE (NAMENDA) 10 MG TABLET memantine (NAMENDA) 10 MG tablet       Take 1 tablet (10 mg) by mouth 2 times daily    Take 1 tablet (10 mg) by mouth 2 times daily                     Again, thank you for allowing me to participate in the care of your patient.        Sincerely,        Benito Foss MD

## 2022-12-02 ENCOUNTER — LAB (OUTPATIENT)
Dept: LAB | Facility: CLINIC | Age: 77
End: 2022-12-02
Payer: COMMERCIAL

## 2022-12-02 DIAGNOSIS — G30.9 MAJOR NEUROCOGNITIVE DISORDER DUE TO ALZHEIMER'S DISEASE (H): ICD-10-CM

## 2022-12-02 DIAGNOSIS — F02.80 MAJOR NEUROCOGNITIVE DISORDER DUE TO ALZHEIMER'S DISEASE (H): ICD-10-CM

## 2022-12-02 LAB
ALBUMIN SERPL BCG-MCNC: 4.2 G/DL (ref 3.5–5.2)
ALP SERPL-CCNC: 69 U/L (ref 35–104)
ALT SERPL W P-5'-P-CCNC: 14 U/L (ref 10–35)
AST SERPL W P-5'-P-CCNC: 19 U/L (ref 10–35)
BILIRUB DIRECT SERPL-MCNC: <0.2 MG/DL (ref 0–0.3)
BILIRUB SERPL-MCNC: 0.2 MG/DL
PROT SERPL-MCNC: 7.1 G/DL (ref 6.4–8.3)

## 2022-12-02 PROCEDURE — 80076 HEPATIC FUNCTION PANEL: CPT

## 2022-12-02 PROCEDURE — 36415 COLL VENOUS BLD VENIPUNCTURE: CPT

## 2022-12-02 NOTE — LETTER
December 3, 2022      Irene H Susanna  [address]   [facility] [address]        Dear ,    We are writing to inform you of your test results.    Your test results fall within the expected range(s) or remain unchanged from previous results.  Please continue with current treatment plan.    Resulted Orders   Hepatic function panel   Result Value Ref Range    Protein Total 7.1 6.4 - 8.3 g/dL    Albumin 4.2 3.5 - 5.2 g/dL    Bilirubin Total 0.2 <=1.2 mg/dL    Alkaline Phosphatase 69 35 - 104 U/L    AST 19 10 - 35 U/L    ALT 14 10 - 35 U/L    Bilirubin Direct <0.20 0.00 - 0.30 mg/dL       If you have any questions or concerns, please call the clinic at the number listed above.       Sincerely,      Benito Foss MD

## 2022-12-22 ENCOUNTER — OFFICE VISIT (OUTPATIENT)
Dept: FAMILY MEDICINE | Facility: CLINIC | Age: 77
End: 2022-12-22
Payer: COMMERCIAL

## 2022-12-22 VITALS
SYSTOLIC BLOOD PRESSURE: 120 MMHG | BODY MASS INDEX: 24.8 KG/M2 | HEIGHT: 63 IN | HEART RATE: 68 BPM | DIASTOLIC BLOOD PRESSURE: 72 MMHG | TEMPERATURE: 98.1 F | RESPIRATION RATE: 14 BRPM | WEIGHT: 140 LBS | OXYGEN SATURATION: 98 %

## 2022-12-22 DIAGNOSIS — Z12.31 VISIT FOR SCREENING MAMMOGRAM: ICD-10-CM

## 2022-12-22 DIAGNOSIS — Z00.00 ENCOUNTER FOR MEDICARE ANNUAL WELLNESS EXAM: Primary | ICD-10-CM

## 2022-12-22 DIAGNOSIS — F02.80 MAJOR NEUROCOGNITIVE DISORDER DUE TO ALZHEIMER'S DISEASE (H): ICD-10-CM

## 2022-12-22 DIAGNOSIS — G30.9 MAJOR NEUROCOGNITIVE DISORDER DUE TO ALZHEIMER'S DISEASE (H): ICD-10-CM

## 2022-12-22 DIAGNOSIS — D32.0 BENIGN MENINGIOMA OF BRAIN (H): ICD-10-CM

## 2022-12-22 PROCEDURE — G0439 PPPS, SUBSEQ VISIT: HCPCS | Performed by: FAMILY MEDICINE

## 2022-12-22 ASSESSMENT — ACTIVITIES OF DAILY LIVING (ADL)
CURRENT_FUNCTION: LAUNDRY REQUIRES ASSISTANCE
CURRENT_FUNCTION: MEDICATION ADMINISTRATION REQUIRES ASSISTANCE
CURRENT_FUNCTION: SHOPPING REQUIRES ASSISTANCE
CURRENT_FUNCTION: PREPARING MEALS REQUIRES ASSISTANCE
CURRENT_FUNCTION: BATHING REQUIRES ASSISTANCE
CURRENT_FUNCTION: HOUSEWORK REQUIRES ASSISTANCE

## 2022-12-22 ASSESSMENT — PATIENT HEALTH QUESTIONNAIRE - PHQ9: SUM OF ALL RESPONSES TO PHQ QUESTIONS 1-9: 1

## 2022-12-22 NOTE — PATIENT INSTRUCTIONS
Patient Education   Personalized Prevention Plan  You are due for the preventive services outlined below.  Your care team is available to assist you in scheduling these services.  If you have already completed any of these items, please share that information with your care team to update in your medical record.  Health Maintenance Due   Topic Date Due     Osteoporosis Screening  Never done     ANNUAL REVIEW OF HM ORDERS  Never done     Mammogram  Never done     Hepatitis C Screening  Never done     Cholesterol Lab  Never done     LUNG CANCER SCREENING  Never done     Annual Wellness Visit  09/01/2022

## 2022-12-22 NOTE — PROGRESS NOTES
SUBJECTIVE:   Irene is a 77 year old who presents for Preventive Visit.  Patient has been advised of split billing requirements and indicates understanding: Yes  Are you in the first 12 months of your Medicare coverage?      Healthy Habits:    In general, how would you rate your overall health?  Fair    Frequency of exercise:  None    Duration of exercise:  Less than 15 minutes    Taking medications regularly:  Yes    Barriers to taking medications:  None    Medication side effects:  None    Ability to successfully perform activities of daily living:  Shopping requires assistance, housework requires assistance, bathing requires assistance, laundry requires assistance, medication administration requires assistance and preparing meals requires assistance    Home Safety:  No safety concerns identified    Hearing Impairment:  No hearing concerns    In the past 6 months, have you been bothered by leaking of urine?  No    In general, how would you rate your overall mental or emotional health?  Fair      PHQ-2 Total Score:    Additional concerns today:  No        Have you ever done Advance Care Planning? (For example, a Health Directive, POLST, or a discussion with a medical provider or your loved ones about your wishes): Yes, patient states has an Advance Care Planning document and will bring a copy to the clinic.       Fall risk  Fallen 2 or more times in the past year?: No  Any fall with injury in the past year?: No    Cognitive Screening Not appropriate due to known dementia    Do you have sleep apnea, excessive snoring or daytime drowsiness?: no    Reviewed and updated as needed this visit by clinical staff    Allergies  Meds              Reviewed and updated as needed this visit by Provider                 Social History     Tobacco Use     Smoking status: Former     Smokeless tobacco: Never   Substance Use Topics     Alcohol use: Not Currently     Alcohol/week: 0.0 standard drinks     If you drink alcohol do you  typically have >3 drinks per day or >7 drinks per week? No    Alcohol Use 12/22/2022   Prescreen: >3 drinks/day or >7 drinks/week? -   Prescreen: >3 drinks/day or >7 drinks/week? No       Depression and Anxiety Follow-Up    How are you doing with your depression since your last visit? No change    How are you doing with your anxiety since your last visit?  No change    Are you having other symptoms that might be associated with depression or anxiety? No    Have you had a significant life event? OTHER: alzheimers     Do you have any concerns with your use of alcohol or other drugs? No    Social History     Tobacco Use     Smoking status: Former     Smokeless tobacco: Never   Vaping Use     Vaping Use: Never used   Substance Use Topics     Alcohol use: Not Currently     Alcohol/week: 0.0 standard drinks     PHQ 9/1/2021 6/9/2022 12/22/2022   PHQ-9 Total Score 2 0 1   Q9: Thoughts of better off dead/self-harm past 2 weeks Not at all Not at all Not at all     No flowsheet data found.  Last PHQ-9 12/22/2022   1.  Little interest or pleasure in doing things 0   2.  Feeling down, depressed, or hopeless 1   3.  Trouble falling or staying asleep, or sleeping too much 0   4.  Feeling tired or having little energy 0   5.  Poor appetite or overeating 0   6.  Feeling bad about yourself 0   7.  Trouble concentrating 0   8.  Moving slowly or restless 0   Q9: Thoughts of better off dead/self-harm past 2 weeks 0   PHQ-9 Total Score 1   Difficulty at work, home, or with people -       Suicide Assessment Five-step Evaluation and Treatment (SAFE-T)      Current providers sharing in care for this patient include:   Patient Care Team:  Lynda Bal MD as PCP - General (Family Medicine)  Lynda Bal MD as Assigned PCP  Benito Foss MD as Assigned Neuroscience Provider  Imani Purcell, PhD LP as Assigned Behavioral Health Provider    The following health maintenance items are reviewed in Epic and correct as of today:  Health  "Maintenance   Topic Date Due     DEXA  Never done     ANNUAL REVIEW OF HM ORDERS  Never done     MAMMO SCREENING  Never done     LIPID  Never done     MEDICARE ANNUAL WELLNESS VISIT  09/01/2022     FALL RISK ASSESSMENT  12/22/2023     ADVANCE CARE PLANNING  12/22/2027     DTAP/TDAP/TD IMMUNIZATION (2 - Td or Tdap) 09/05/2029     COLORECTAL CANCER SCREENING  08/31/2032     PHQ-2 (once per calendar year)  Completed     INFLUENZA VACCINE  Completed     Pneumococcal Vaccine: 65+ Years  Completed     ZOSTER IMMUNIZATION  Completed     COVID-19 Vaccine  Completed     IPV IMMUNIZATION  Aged Out     MENINGITIS IMMUNIZATION  Aged Out     HEPATITIS C SCREENING  Discontinued     LUNG CANCER SCREENING  Discontinued     Lab work is in process  Labs reviewed in EPIC      Mammogram Screening - Patient over age 75, has elected to discontinue screenings.  Pertinent mammograms are reviewed under the imaging tab.    Review of Systems  Constitutional, HEENT, cardiovascular, pulmonary, gi and gu systems are negative, except as otherwise noted.    OBJECTIVE:   /72 (BP Location: Right arm, Patient Position: Sitting, Cuff Size: Adult Regular)   Pulse 68   Temp 98.1  F (36.7  C) (Tympanic)   Resp 14   Ht 1.6 m (5' 3\")   Wt 63.5 kg (140 lb)   SpO2 98%   BMI 24.80 kg/m   Estimated body mass index is 24.8 kg/m  as calculated from the following:    Height as of this encounter: 1.6 m (5' 3\").    Weight as of this encounter: 63.5 kg (140 lb).  Physical Exam  GENERAL: healthy, alert and no distress  NECK: no adenopathy, no asymmetry, masses, or scars and thyroid normal to palpation  RESP: lungs clear to auscultation - no rales, rhonchi or wheezes  CV: regular rate and rhythm, normal S1 S2, no S3 or S4, no murmur, click or rub, no peripheral edema and peripheral pulses strong  ABDOMEN: soft, nontender, no hepatosplenomegaly, no masses and bowel sounds normal  MS: no gross musculoskeletal defects noted, no edema  NEURO: Normal strength " and tone, sensory exam grossly normal and abnormal mental status - memory loss    Labs and imaging reviewed  neuropsych testing reviewed    ASSESSMENT / PLAN:   (Z00.00) Encounter for Medicare annual wellness exam  (primary encounter diagnosis)  Comment:    Plan:        (D32.0) Right middle cranial fossa 2.2 x 1.9 x 1.8 cm meningioma  Comment: has been stable  Plan:      (G30.9,  F02.80) Major neurocognitive disorder due to Alzheimer's disease (H)  Comment:  Seeing neurology  Plan: on Namenda    Patient has been advised of split billing requirements and indicates understanding: Yes      COUNSELING:  Reviewed preventive health counseling, as reflected in patient instructions       Regular exercise       Healthy diet/nutrition        She reports that she has quit smoking. She has never used smokeless tobacco.      Appropriate preventive services were discussed with this patient, including applicable screening as appropriate for cardiovascular disease, diabetes, osteopenia/osteoporosis, and glaucoma.  As appropriate for age/gender, discussed screening for colorectal cancer, prostate cancer, breast cancer, and cervical cancer. Checklist reviewing preventive services available has been given to the patient.    Reviewed patients plan of care and provided an AVS. The Basic Care Plan (routine screening as documented in Health Maintenance) for Irene meets the Care Plan requirement. This Care Plan has been established and reviewed with the Patient.      Lynda Bal MD  [facility]    Identified Health Risks:

## 2023-04-13 ENCOUNTER — OFFICE VISIT (OUTPATIENT)
Dept: FAMILY MEDICINE | Facility: CLINIC | Age: 78
End: 2023-04-13
Payer: COMMERCIAL

## 2023-04-13 VITALS
TEMPERATURE: 101.4 F | HEART RATE: 84 BPM | DIASTOLIC BLOOD PRESSURE: 78 MMHG | SYSTOLIC BLOOD PRESSURE: 124 MMHG | BODY MASS INDEX: 24.8 KG/M2 | WEIGHT: 140 LBS | OXYGEN SATURATION: 97 % | HEIGHT: 63 IN | RESPIRATION RATE: 18 BRPM

## 2023-04-13 DIAGNOSIS — R05.1 ACUTE COUGH: ICD-10-CM

## 2023-04-13 DIAGNOSIS — D32.0 BENIGN MENINGIOMA OF BRAIN (H): ICD-10-CM

## 2023-04-13 DIAGNOSIS — J01.90 ACUTE SINUSITIS WITH SYMPTOMS > 10 DAYS: ICD-10-CM

## 2023-04-13 DIAGNOSIS — F41.9 ANXIETY: ICD-10-CM

## 2023-04-13 DIAGNOSIS — F02.80 MAJOR NEUROCOGNITIVE DISORDER DUE TO ALZHEIMER'S DISEASE (H): Primary | ICD-10-CM

## 2023-04-13 DIAGNOSIS — U07.1 INFECTION DUE TO 2019 NOVEL CORONAVIRUS: ICD-10-CM

## 2023-04-13 DIAGNOSIS — G30.9 MAJOR NEUROCOGNITIVE DISORDER DUE TO ALZHEIMER'S DISEASE (H): Primary | ICD-10-CM

## 2023-04-13 PROBLEM — D69.2 SENILE PURPURA (H): Status: RESOLVED | Noted: 2022-06-09 | Resolved: 2023-04-13

## 2023-04-13 PROCEDURE — U0005 INFEC AGEN DETEC AMPLI PROBE: HCPCS | Performed by: FAMILY MEDICINE

## 2023-04-13 PROCEDURE — 99214 OFFICE O/P EST MOD 30 MIN: CPT | Mod: CS | Performed by: FAMILY MEDICINE

## 2023-04-13 PROCEDURE — U0003 INFECTIOUS AGENT DETECTION BY NUCLEIC ACID (DNA OR RNA); SEVERE ACUTE RESPIRATORY SYNDROME CORONAVIRUS 2 (SARS-COV-2) (CORONAVIRUS DISEASE [COVID-19]), AMPLIFIED PROBE TECHNIQUE, MAKING USE OF HIGH THROUGHPUT TECHNOLOGIES AS DESCRIBED BY CMS-2020-01-R: HCPCS | Performed by: FAMILY MEDICINE

## 2023-04-13 RX ORDER — CITALOPRAM HYDROBROMIDE 40 MG/1
40 TABLET ORAL DAILY
Qty: 90 TABLET | Refills: 3 | Status: SHIPPED | OUTPATIENT
Start: 2023-04-13 | End: 2024-05-17

## 2023-04-13 ASSESSMENT — PAIN SCALES - GENERAL: PAINLEVEL: NO PAIN (0)

## 2023-04-13 NOTE — PROGRESS NOTES
"  Assessment & Plan     Major neurocognitive disorder due to Alzheimer's disease (H)  On Namenda  Seeing neurology      Acute cough  R/o covid  Unclear about start of symptoms.  Cough for a few \"weeks\" but didn't realize she was febrile today and T 101.4  At her age, may benefit from treatment with Paxlovid if she is positive given the unknown start to symptoms.   - Symptomatic COVID-19 Virus (Coronavirus) by PCR Nose    Benign meningioma of brain (H)  2 cm extra-axial nodule right middle cranial fossa - benign mengioma  Last imaging 11/2021    Anxiety  stable  - citalopram (CELEXA) 40 MG tablet; Take 1 tablet (40 mg) by mouth daily    Acute sinusitis with symptoms > 10 days  Given headaches, fever, worsening symptoms and foul post nasal drainage, will treat as sinusitis.  - amoxicillin-clavulanate (AUGMENTIN) 875-125 MG tablet; Take 1 tablet by mouth 2 times daily for 7 days                 Lynda Bal MD  [facility]   Irene is a 77 year old, presenting for the following health issues:  Dementia      HPI     - Follow up due to Alzheimer's  Neurology A/P from 11/30/2022:   Major neurocognitive disorder due to Alzheimer's dementia  77-year-old female with history of chronic tension type headache, depression, IBS who was initially evaluated on 3/23/2022 for progressive cognitive decline.  She scored 16/30 on MoCA that suggested a major neurocognitive disorder.  Subsequent work-up included MRI brain that showed incidental right middle cranial fossa meningioma.  Lab work for common causes of cognitive decline was normal.  I have informed her that we are dealing with major neurocognitive disorder due to Alzheimer's dementia.  I have recommended:     1.  Continue Namenda 10 mg twice daily.  Previously she did not tolerate Aricept  2.  Check hepatic profile  3.  Patient was told to remain socially active, do crossword puzzle and stimulating activities  4.  Follow-up will be in 1 " "year     - Left ear pain    - Headaches    - PMHx right middle cranial fossa meningioma    - Dry cough x1 week. She notes that she has thrown up due to coughing. No other cold. Loss of appetite. Temperature is 101.4 in clinic today    Dizziness  Onset/Duration: 1-2 weeks  Description:   Do you feel faint: YES  Does it feel like the surroundings (bed, room) are moving: YES  Unsteady/off balance: YES  Have you passed out or fallen: No  Intensity: severe  Progression of Symptoms: intermittent  Accompanying Signs & Symptoms:  Heart palpitations or chest pain: No  Nausea, vomiting: YES  Weakness or lack of coordination in arms or legs: No  Vision or speech changes: No  Numbness or tingling: No  Ringing in ears (Tinnitus): No  Hearing Loss: No  History:   Head trauma/concussion history: No  Previous similar symptoms: No  Recent bleeding history: No  Any new medications (BP?): No  Precipitating factors:   Worse with activity: No  Worse with head movement: No  Alleviating factors:   Does staying in a fixed position give relief: no   Therapies tried and outcome: None        Review of Systems   Constitutional, HEENT, cardiovascular, pulmonary, gi and gu systems are negative, except as otherwise noted.      Objective    /78   Pulse 84   Temp (!) 101.4  F (38.6  C) (Tympanic)   Resp 18   Ht 1.6 m (5' 3\")   Wt 63.5 kg (140 lb)   SpO2 97%   BMI 24.80 kg/m    Body mass index is 24.8 kg/m .  Physical Exam   GENERAL: healthy, alert and no distress  HENT: ear canals and TM's normal, nose and mouth without ulcers or lesions  NECK: no adenopathy, no asymmetry, masses, or scars and thyroid normal to palpation  RESP: lungs clear to auscultation - no rales, rhonchi or wheezes  CV: regular rate and rhythm, normal S1 S2, no S3 or S4, no murmur, click or rub, no peripheral edema and peripheral pulses strong  ABDOMEN: soft, nontender, no hepatosplenomegaly, no masses and bowel sounds normal  MS: no gross musculoskeletal defects " noted, no edema  PSYCH: speech is still pretty linear and goal directed  Has to check with  on some answers

## 2023-04-13 NOTE — PATIENT INSTRUCTIONS
"Thank you for choosing [facility] [place].      When you are out of refills or the refills say \"zero\", it is time to schedule your next appointment in clinic!    Labs are released to you almost immediately and sometimes before I have had a chance to review them.  I review labs regularly and once they are all in, you will be sent a letter with your results and/or if you are signed up for on-line services, you will be e-mailed the results with my interpretation. If there are serious findings, you typically will be called.    If you have any questions about your visit, your symptoms, your medication, your test results or it is not clear what your diagnosis or treatment plan is please contact me (via [facility]) or call the care team at [phone] option #2      Our Clinic hours are:  Mondays    7:20 am - 7 pm  Tues -  Fri  7:20 am - 5 pm      The clinic lab opens at 7:30 am Mon - Fri and appointments are required.    [facility]  Ph. [phone]  Monday-Thursday 8 am - 7pm  Tues/Wed/Fri 8 am - 5:30 pm       "

## 2023-04-14 ENCOUNTER — TELEPHONE (OUTPATIENT)
Dept: NURSING | Facility: CLINIC | Age: 78
End: 2023-04-14
Payer: COMMERCIAL

## 2023-04-14 ENCOUNTER — TELEPHONE (OUTPATIENT)
Dept: FAMILY MEDICINE | Facility: CLINIC | Age: 78
End: 2023-04-14
Payer: COMMERCIAL

## 2023-04-14 DIAGNOSIS — U07.1 INFECTION DUE TO 2019 NOVEL CORONAVIRUS: Primary | ICD-10-CM

## 2023-04-14 LAB — SARS-COV-2 RNA RESP QL NAA+PROBE: POSITIVE

## 2023-04-14 NOTE — TELEPHONE ENCOUNTER
Contacted patient's  and he has already picked up the Paxlovid.     Kiara Munson RN on 4/14/2023 at 3:15 PM

## 2023-04-14 NOTE — TELEPHONE ENCOUNTER
Pt in to be seen yesterday.    COVID positive - see results    Symptom on onset unclear due to her dementia.    Febrile yesterday.  Doubt she has really had this for a week.  I would recommend given age and other risk factors that we treat with paxlovid.    Prescription already sent to pharmacy.  Ashley Ja[facility].  Please notify patient.      Lynda Bal M.D.

## 2023-04-14 NOTE — TELEPHONE ENCOUNTER
RN COVID TREATMENT VISIT  04/14/23      The patient has been triaged and does not require a higher level of care.    Irene Mullins  77 year old  Current weight? 140      Has the patient been seen by a primary care provider at an [facility] or [facility] within the past two years? Yes.   Have you been in close proximity to/do you have a known exposure to a person with a confirmed case of influenza? No.     General treatment eligibility:  Date of positive COVID test (PCR or at home)?  4/13/23    Are you or have you been hospitalized for this COVID-19 infection? No.   Have you received monoclonal antibodies or antiviral treatment for COVID-19 since this positive test? No.   Do you have any of the following conditions that place you at risk of being very sick from COVID-19?   - Age 50 years or older  Yes, patient has at least one high risk condition as noted above.     Current COVID symptoms:   - fever or chills  - cough  - fatigue  - muscle or body aches  - sore throat  Yes. Patient has at least one symptom as selected.     How many days since symptoms started? 5 days or less. Established patient, 12 years or older weighing at least 88.2 lbs, who has symptoms that started in the past 5 days, has not been hospitalized nor received treatment already, and is at risk for being very sick from COVID-19.     Treatment eligibility by RN:    Are you currently pregnant or nursing? No    Do you have a clinically significant hypersensitivity to nirmatrelvir or ritonavir, or toxic epidermal necrolysis (TEN) or Caal-Arpit Syndrome? No    Do you have a history of hepatitis, any hepatic impairment on the Problem List (such as Child-Daven[place] Class C, cirrhosis, fatty liver disease, alcoholic liver disease), or was the last liver lab (hepatic panel, ALT, AST, ALK Phos, bilirubin) elevated in the past 6 months? No    Do you have any history of severe renal impairment (eGFR < 30mL/min)? No    Is patient  eligible to continue?   Yes, patient meets all eligibility requirements for the RN COVID treatment (as denoted by all no responses above).     Current Outpatient Medications   Medication Sig Dispense Refill     amoxicillin-clavulanate (AUGMENTIN) 875-125 MG tablet Take 1 tablet by mouth 2 times daily for 7 days 14 tablet 0     citalopram (CELEXA) 40 MG tablet Take 1 tablet (40 mg) by mouth daily 90 tablet 3     Cranberry-Vitamin C (AZO CRANBERRY URINARY TRACT PO) Take by mouth daily       melatonin 5 MG tablet Take 5 mg by mouth nightly as needed for sleep       memantine (NAMENDA) 10 MG tablet Take 1 tablet (10 mg) by mouth 2 times daily 60 tablet 11       Medications from List 1 of the standing order (on medications that exclude the use of Paxlovid) that patient is taking: NONE. Is patient taking Ravens[place]'s Wort? No  Is patient taking [place]'s Wort or any meds from List 1? No.   Medications from List 2 of the standing order (on meds that provider needs to adjust) that patient is taking: NONE. Is patient on any of the meds from List 2? No.   Medications from List 3 of standing order (on meds that a RN needs to adjust) that patient is taking: NONE. Is patient on any meds from List 3? No.     Paxlovid has an approximate 90% reduction in hospitalization. Paxlovid can possibly cause altered sense of taste, diarrhea (loose, watery stools), high blood pressure, muscle aches.     Would patient like a Paxlovid prescription?   Yes.   Lab Results   Component Value Date    GFRESTIMATED 75 08/06/2021       Was last eGFR reduced? No, eGFR 60 or greater/ No Result on record. Patient can receive the normal renal function dose. Paxlovid Rx sent to [place] pharmacy   [facility] [phone]    [address], [place] [address]    Hours:   Mon-Fri: 9:00a - 5:00p    Drive Thru available    Temporary change to home medications: None    All medication adjustments (holds, etc) were discussed with the  patient and patient was asked to repeat back (teachback) their med adjustment.  Did patient understand med adjustment? Yes, patient repeated back and understood correctly.        Reviewed the following instructions with the patient:    Paxlovid (nimatrelvir and ritonavir)    How it works  Two medicines (nirmatrelvir and ritonavir) are taken together. They stop the virus from growing. Less amount of virus is easier for your body to fight.    How to take    Medicine comes in a daily container with both medicine tablets. Take by mouth twice daily (once in the morning, once at night) for 5 days.    The number of tablets to take varies by patient.    Don't chew or break capsules. Swallow whole.    When to take  Take as soon as possible after positive COVID-19 test result, and within 5 days of your first symptoms.    Possible side effects  Can cause altered sense of taste, diarrhea (loose, watery stools), high blood pressure, muscle aches.    Jayde Gould RN

## 2023-06-30 ENCOUNTER — HOSPITAL ENCOUNTER (EMERGENCY)
Facility: CLINIC | Age: 78
End: 2023-06-30
Payer: COMMERCIAL

## 2023-09-27 ENCOUNTER — LAB (OUTPATIENT)
Dept: LAB | Facility: CLINIC | Age: 78
End: 2023-09-27
Payer: COMMERCIAL

## 2023-09-27 ENCOUNTER — OFFICE VISIT (OUTPATIENT)
Dept: FAMILY MEDICINE | Facility: CLINIC | Age: 78
End: 2023-09-27
Payer: COMMERCIAL

## 2023-09-27 VITALS
SYSTOLIC BLOOD PRESSURE: 136 MMHG | TEMPERATURE: 98.1 F | OXYGEN SATURATION: 96 % | WEIGHT: 130.38 LBS | HEART RATE: 75 BPM | DIASTOLIC BLOOD PRESSURE: 80 MMHG | RESPIRATION RATE: 18 BRPM | HEIGHT: 63 IN | BODY MASS INDEX: 23.1 KG/M2

## 2023-09-27 DIAGNOSIS — R51.9 CHRONIC DAILY HEADACHE: ICD-10-CM

## 2023-09-27 DIAGNOSIS — R41.9 NEUROCOGNITIVE DISORDER: ICD-10-CM

## 2023-09-27 DIAGNOSIS — R25.2 LEG CRAMPS: ICD-10-CM

## 2023-09-27 DIAGNOSIS — Z76.89 SLEEP CONCERN: Primary | ICD-10-CM

## 2023-09-27 DIAGNOSIS — D32.0 BENIGN MENINGIOMA OF BRAIN (H): ICD-10-CM

## 2023-09-27 LAB
ANION GAP SERPL CALCULATED.3IONS-SCNC: 13 MMOL/L (ref 7–15)
BUN SERPL-MCNC: 12.4 MG/DL (ref 8–23)
CALCIUM SERPL-MCNC: 9.4 MG/DL (ref 8.8–10.2)
CHLORIDE SERPL-SCNC: 98 MMOL/L (ref 98–107)
CREAT SERPL-MCNC: 0.76 MG/DL (ref 0.51–0.95)
DEPRECATED HCO3 PLAS-SCNC: 24 MMOL/L (ref 22–29)
EGFRCR SERPLBLD CKD-EPI 2021: 81 ML/MIN/1.73M2
GLUCOSE SERPL-MCNC: 88 MG/DL (ref 70–99)
MAGNESIUM SERPL-MCNC: 2.1 MG/DL (ref 1.7–2.3)
POTASSIUM SERPL-SCNC: 4.3 MMOL/L (ref 3.4–5.3)
SODIUM SERPL-SCNC: 135 MMOL/L (ref 135–145)

## 2023-09-27 PROCEDURE — 90662 IIV NO PRSV INCREASED AG IM: CPT | Performed by: FAMILY MEDICINE

## 2023-09-27 PROCEDURE — 83735 ASSAY OF MAGNESIUM: CPT

## 2023-09-27 PROCEDURE — 80048 BASIC METABOLIC PNL TOTAL CA: CPT

## 2023-09-27 PROCEDURE — 36415 COLL VENOUS BLD VENIPUNCTURE: CPT

## 2023-09-27 PROCEDURE — G0008 ADMIN INFLUENZA VIRUS VAC: HCPCS | Performed by: FAMILY MEDICINE

## 2023-09-27 PROCEDURE — 99214 OFFICE O/P EST MOD 30 MIN: CPT | Mod: 25 | Performed by: FAMILY MEDICINE

## 2023-09-27 ASSESSMENT — PATIENT HEALTH QUESTIONNAIRE - PHQ9
SUM OF ALL RESPONSES TO PHQ QUESTIONS 1-9: 8
10. IF YOU CHECKED OFF ANY PROBLEMS, HOW DIFFICULT HAVE THESE PROBLEMS MADE IT FOR YOU TO DO YOUR WORK, TAKE CARE OF THINGS AT HOME, OR GET ALONG WITH OTHER PEOPLE: SOMEWHAT DIFFICULT
SUM OF ALL RESPONSES TO PHQ QUESTIONS 1-9: 8

## 2023-09-27 ASSESSMENT — PAIN SCALES - GENERAL: PAINLEVEL: MODERATE PAIN (5)

## 2023-09-27 NOTE — PROGRESS NOTES
Assessment & Plan       Sleep concern  Headache on waking from sleep or naps = ?nocturnal hypoxemia - will refer to sleep for further evaluation  Recommend 1000 mg acetaminophen before bed and again in the am to see if this helps  Over the counter Mg is okay  - Adult Sleep Eval & Management  Referral; Future    Neurocognitive disorder  Has follow up with neurology in a few weeks    Benign meningioma of brain (H)  Last imaging almost 2 years ago, patient concerned that this has grown and is leading to the headaches.  Based on the timing of the headaches, I don't have a high clinical suspicion of this, but will r/o   - MR Brain w/o Contrast; Future    Chronic daily headache  See above  ?meningioma  ?overnight hypoxia  - MR Brain w/o Contrast; Future    Leg cramps  Increase water intake  Stretching  Check electrolytes and magnesium  - Basic metabolic panel  (Ca, Cl, CO2, Creat, Gluc, K, Na, BUN); Future  - Magnesium; Future                 Lynda Bal MD  [facility]    Kacy Bonilla is a 78 year old, presenting for the following health issues:  Memory Loss        9/27/2023     1:13 PM   Additional Questions   Roomed by Saray ROSENBERG   Accompanied by        HPI     - It is she and husbands 58th anniversary 10/1/23    Migraine   Since your last clinic visit, how have your headaches changed?  Worsened  How often are you getting headaches or migraines? She is getting daily headaches each morning when waking up. She notes that headache resolves about 2 hours after taking Tylenol. Pain is located in facial area  Are you able to do normal daily activities when you have a migraine? No  Are you taking rescue/relief medications? (Select all that apply) Tylenol  How helpful is your rescue/relief medication?  I get total relief  Are you taking any medications to prevent migraines? (Select all that apply)  No  In the past 4 weeks, how often have you gone to urgent care or the  "emergency room because of your headaches?  0  Headache is always first thing in the morning or after she takes a nap.  Will take one 500 mg acetaminophen and that will help the headache to resolve in about 203 hours.     She does snore, also hits her  overnight.  No witnessed apnea.  Is tired on waking.      Medication Followup of Alzheimer's  - has apt with neurologist in a few months.  Namenda 10mg bid  Taking Medication as prescribed: yes  Side Effects:  None  Medication Helping Symptoms:  yes      Review of Systems   Constitutional, HEENT, cardiovascular, pulmonary, gi and gu systems are negative, except as otherwise noted.    Also getting frequent leg cramps.  Does stretch and gets a lot of fluids.    Stopped taking Mg supplement with the neurologist told her \"nothing over the counter\".         Objective    /80   Pulse 75   Temp 98.1  F (36.7  C) (Tympanic)   Resp 18   Ht 1.6 m (5' 3\")   Wt 59.1 kg (130 lb 6 oz)   SpO2 96%   BMI 23.09 kg/m    Body mass index is 23.09 kg/m .  Physical Exam   GENERAL APPEARANCE: healthy, alert, and no distress  NEURO: Normal strength and tone, mentation intact (checks with  on some things, but overall does pretty well), speech normal, and cranial nerves 2-12 intact                      "

## 2023-09-27 NOTE — LETTER
September 28, 2023      Irene Mullins  [address]   [facility] [address]        Dear ,    We are writing to inform you of your test results. These are normal/acceptable results.     Resulted Orders   Basic metabolic panel  (Ca, Cl, CO2, Creat, Gluc, K, Na, BUN)   Result Value Ref Range    Sodium 135 135 - 145 mmol/L      Comment:      Reference intervals for this test were updated on 09/26/2023 to more accurately reflect our healthy population. There may be differences in the flagging of prior results with similar values performed with this method. Interpretation of those prior results can be made in the context of the updated reference intervals.     Potassium 4.3 3.4 - 5.3 mmol/L    Chloride 98 98 - 107 mmol/L    Carbon Dioxide (CO2) 24 22 - 29 mmol/L    Anion Gap 13 7 - 15 mmol/L    Urea Nitrogen 12.4 8.0 - 23.0 mg/dL    Creatinine 0.76 0.51 - 0.95 mg/dL    GFR Estimate 81 >60 mL/min/1.73m2    Calcium 9.4 8.8 - 10.2 mg/dL    Glucose 88 70 - 99 mg/dL   Magnesium   Result Value Ref Range    Magnesium 2.1 1.7 - 2.3 mg/dL       If you have any questions or concerns, please call the clinic at the number listed above.       Sincerely,      Lynda Bal MD

## 2023-09-27 NOTE — PATIENT INSTRUCTIONS
Acetaminophen 1000 mg at bedtime and again in the morning for the headaches      Schedule sleep evaluation

## 2023-10-06 ENCOUNTER — HOSPITAL ENCOUNTER (OUTPATIENT)
Dept: MRI IMAGING | Facility: CLINIC | Age: 78
Discharge: HOME | End: 2023-10-06
Attending: FAMILY MEDICINE | Admitting: FAMILY MEDICINE
Payer: COMMERCIAL

## 2023-10-06 DIAGNOSIS — D32.0 BENIGN MENINGIOMA OF BRAIN (H): ICD-10-CM

## 2023-10-06 DIAGNOSIS — R51.9 CHRONIC DAILY HEADACHE: ICD-10-CM

## 2023-10-06 PROCEDURE — 70553 MRI BRAIN STEM W/O & W/DYE: CPT

## 2023-10-06 PROCEDURE — A9585 GADOBUTROL INJECTION: HCPCS | Performed by: FAMILY MEDICINE

## 2023-10-06 PROCEDURE — 255N000002 HC RX 255 OP 636: Performed by: FAMILY MEDICINE

## 2023-10-06 RX ORDER — GADOBUTROL 604.72 MG/ML
5.5 INJECTION INTRAVENOUS ONCE
Status: COMPLETED | OUTPATIENT
Start: 2023-10-06 | End: 2023-10-06

## 2023-10-06 RX ADMIN — GADOBUTROL 5.5 ML: 604.72 INJECTION INTRAVENOUS at 08:55

## 2023-11-17 DIAGNOSIS — G30.9 MAJOR NEUROCOGNITIVE DISORDER DUE TO ALZHEIMER'S DISEASE (H): ICD-10-CM

## 2023-11-17 DIAGNOSIS — F02.80 MAJOR NEUROCOGNITIVE DISORDER DUE TO ALZHEIMER'S DISEASE (H): ICD-10-CM

## 2023-11-17 RX ORDER — MEMANTINE HYDROCHLORIDE 10 MG/1
10 TABLET ORAL 2 TIMES DAILY
Qty: 60 TABLET | Refills: 0 | Status: SHIPPED | OUTPATIENT
Start: 2023-11-17 | End: 2023-12-13

## 2023-11-17 NOTE — TELEPHONE ENCOUNTER
Refill request for: memantine (NAMENDA) 10 MG tablet    Directions: Take 1 tablet (10 mg) by mouth 2 times daily     LOV: 11/30/22  NOV: 12/13/23    30 day supply with 0 refills Medication T'd for review and signature  Celeste Porter CMA on 11/17/2023 at 2:48 PM  [facility]

## 2023-12-12 NOTE — PROGRESS NOTES
NEUROLOGY FOLLOW UP VISIT  NOTE       [facility] [place]  [address] [place] [address]  Tel: [phone]  Fax: [phone]  [email or web address]     Irene Mullins,  1945, MRN [id number]  PCP: Lynda Bal  Date: 2023      ASSESSMENT & PLAN     Visit Diagnosis   Chronic tension-type headache, intractable  Benign meningioma of brain (H)  Major neurocognitive disorder due to Alzheimer's disease (H)  Analgesic rebound headache     Major neurocognitive disorder due to Alzheimer's dementia  78 years old female with history of chronic tension-type headache, depression, IBS with major neurocognitive disorder due to Alzheimer's dementia.  Her MoCA score has declined to 11/30 compared to 16/30-year ago.  Previously she did not tolerate Aricept.  I have recommended:    1.  Continue on Namenda 10 mg twice daily.  Prescriptions were filled  2.  Add Exelon gradually increasing the dose to 6 mg twice daily  3.  Check hepatic profile  4.  Patient was told to remain socially active  5.  She has a known middle cranial fossa meningioma and a repeat MRI scan done earlier this year remains unchanged  6.  She also has chronic tension-type headache and takes excessive amount of Tylenol and I suspect has developed rebound headache.  I have told her to minimize the use of over-the-counter pain medication  7.  Follow-up in 1 year    Thank you again for this referral, please feel free to contact me if you have any questions.    Benito Foss MD  [facility]  (Formerly, [facility]     HISTORY OF PRESENT ILLNESS     Patient is a 78 years old female with chronic tension-type headache, depression, IBS, right middle cranial fossa meningioma who was initially evaluated on 3/23/2022 for progressive cognitive decline. She scored 16/30 on MoCA and her symptoms strongly suggested the diagnosis of Alzheimer's dementia.  She had MRI of the  brain that showed an incidental right middle cranial fossa meningioma.  She had neuropsychological evaluation that suggested major neurocognitive disorder due to multiple etiologies.  Lab work for common causes of cognitive decline included normal vitamin B1, RPR, alpha-tocopherol, methylmalonic acid level, homocystine, folate.  Previously she was started on Namenda 5 mg daily by her primary care physician dose was increased to 10 mg twice daily.  During her last visit she was told to remain socially active and reports ongoing decline in her cognition.  She tends to struggle to get her words out.  She lives at home with her  and her son who lives 1 mile away fixes their meals and takes them to doctors offices.  Patient is able to attend to activities of daily living.  Son is managing their finances.    She also has history of headaches for which she was evaluated in the past. She describes it almost daily headaches during which she gets a sharp shooting pain in different part of the brain.  She was diagnosed with icepick headaches and admits she has been taking Tylenol almost on a daily basis in the morning and evening for long time.  She clearly suffers from depression and anxiety which likely is the underlying cause for her chronic tension type headache resulting in ice pick headaches and now likely have evolved into analgesic rebound headache as she is taking Tylenol almost on a regular basis.  She was told to avoid over-the-counter pain medication, continue Celexa.     PROBLEM LIST   Patient Active Problem List   Diagnosis Code    Calcific tendinitis of shoulder M75.30    Chronic tension-type headache, intractable G44.221    History of basal cell carcinoma Z85.828    Insomnia G47.00    Irritable bowel syndrome K58.9    Right middle cranial fossa 2.2 x 1.9 x 1.8 cm meningioma D32.0    Osteopenia M85.80    Seborrheic keratosis L82.1    Presbyesophagus K22.89    Depression with anxiety F41.8    Analgesic  rebound headache G44.40, T39.95XA    Major neurocognitive disorder due to Alzheimer's disease (H) G30.9, F02.80         PAST MEDICAL & SURGICAL HISTORY     Past Medical History:   Patient  has no past medical history on file.    Surgical History:  She  has a past surgical history that includes Colonoscopy (N/A, 8/31/2022).     SOCIAL HISTORY     Reviewed, and she  reports that she has quit smoking. She has never used smokeless tobacco. She reports that she does not currently use alcohol.     FAMILY HISTORY     Reviewed, and family history includes Coronary Artery Disease in her sister; Diabetes in her sister; Hyperlipidemia in her brother and sister; Hypertension in her mother; Myocardial Infarction in her mother.     ALLERGIES     Allergies   Allergen Reactions    Oxycodone-Acetaminophen      Other reaction(s): Dizziness, GI Upset    Sulfamethoxazole-Trimethoprim      Other reaction(s): Other - Describe In Comment Field  Hot flashes, face and arms red for 1 hour after taking medication    Tramadol Nausea and Vomiting    Valproic Acid      Other reaction(s): Other - Describe In Comment Field  Hair loss, elevated liver enzymes         REVIEW OF SYSTEMS     A 12 point review of system was performed and was negative except as outlined in the history of present illness.     HOME MEDICATIONS     Current Outpatient Rx   Medication Sig Dispense Refill    citalopram (CELEXA) 40 MG tablet Take 1 tablet (40 mg) by mouth daily 90 tablet 3    Ginkgo Biloba 40 MG TABS       melatonin 5 MG tablet Take 5 mg by mouth nightly as needed for sleep      memantine (NAMENDA) 10 MG tablet Take 1 tablet (10 mg) by mouth 2 times daily 60 tablet 11    rivastigmine (EXELON) 1.5 MG capsule Take 1 capsule (1.5 mg) by mouth 2 times daily for 14 days, THEN 2 capsules (3 mg) 2 times daily for 14 days, THEN 3 capsules (4.5 mg) 2 times daily for 14 days. 168 capsule 0    [START ON 1/25/2024] rivastigmine (EXELON) 6 MG capsule Take 1 capsule (6 mg)  by mouth 2 times daily 60 capsule 11    VITAMIN D PO            PHYSICAL EXAM     Vital signs  /79 (BP Location: Left arm, Patient Position: Sitting)   Pulse 68   Wt 58.5 kg (129 lb)   BMI 22.85 kg/m      Weight:   129 lbs 0 oz      3/23/2022    11:13 AM 12/13/2023     1:12 PM   MOCA   Visuospatial/Executive  2 1   Naming 3 2   Attention - Digits 2 1   Attention - Letters 1 1   Attention - Subtraction 1 0   Language - Repeat 1 2   Language - Fluency  0 0   Abstraction 2 1   Delayed Recall 0 0   Orientation 4 3   Education 0 0   MOCA Score 16 11   Administered by:  Celeste Porter, PAULO Gayle LPN       Patient is alert and oriented appears depressed.  Vital signs were reviewed and are documented in electronic medical record.  Neck supple.  Neurologically speech was normal.  Cranial nerves II through XII are intact.  Motor strength 5/5 reflexes 2+ toes downgoing.  She has dysmetria on finger-nose testing.  Intact sensation.  She has a wide-based gait      PERTINENT DIAGNOSTIC STUDIES     Following studies were reviewed:      MRI BRAIN 10/06/2023  Stable meningioma along the right middle cranial fossa  floor.    MRI BRAIN 11/23/2021  1. 2.2 x 1.9 x 1.8 cm extra-axial nodule along the floor of the right  middle cranial fossa consistent with the meningioma described in the  patient's history.  2. Diffuse cerebral volume loss and cerebral white matter changes  consistent with chronic small vessel ischemic disease     NEUROPSYCHOLOGICAL EVALUATION 11/16/2022  Diagnosis: Major Neurocognitive Disorder due to Multiple Etiologies (right sided meningioma and Alzheimer's)     RECOMMENDATIONS:  Driving and Activities of Daily Living  Ms. Mullins would benefit from help in her daily activities particularly in terms of managing medications (i.e., having a pillbox set up for her and having someone make sure that she is taking her medications regularly and as prescribed). She will also continue to benefit from assistance  with managing her finances.   Given her significant spatial deficits, Ms. Mullins is encouraged to continue to refrain from driving.   It is strongly recommended that a family member or close friend accompany Ms. Mullins to all appointments to ensure that accurate information is given to providers and instructions are followed. It will be helpful to present the information in brief, repeated segments and have her repeat back the information in her own words to ensure understanding.   If not already done, completion of paperwork for advance directives and assignment of healthcare and financial power of  should be considered at this time.  Family Resources  It will be increasingly important for Ms. Mullins and her family to have a strong support network in place. The Alzheimer s Association (http://www.alz.org/[place]nd 1-442.657.4683) has information about local support groups as well as informational materials.   Physical and Emotional Health  Ms. Mullins will continue to benefit from the use of a cognitive enhancing medication (I.e., Namenda).  Given the severity of Ms. Mullins's spatial deficits, updated neuroimaging could be considered to determine if there has been a change in the meningioma. Her PCP or Neurologist can determine if an updated MRI is appropriate.   It is important that Ms. Mullins continue to adhere to her medication treatment regimen and follow a healthy diet so as to maintain her physical health, as this can have a significant impact on her physical, emotional, and cognitive functioning.   Ms. Mullins appeared rather anxious throughout today's evaluation. She is encouraged to speak with her Neurologist or PCP to determine if an adjustment or alternative medications may help Ms. Mullins better manage her mood.   In the meantime, behavioral activation techniques such as regular exercise (under the guidance of her physician), recreational activities and regular social interaction (with proper social distancing  when indicated) would likely be effective in helping Ms. Mullins to manage her mood.   Memory and Organization  Ms. Mullins is encouraged to continue to engage in stimulating activities, (i.e., reading, card games, puzzles) to keep her cognitively active.   In her daily life, Ms. Mullins will continue to benefit from the use of compensation techniques. That is, she may find it helpful to post reminder notes around the house, make lists, and carry a small calendar so that she can feel more comfortable and confident in her ability to remember information. A daily planner could also be used as a memory book where important information is recorded and organized for future reference.   Ms. Mullins should also create a system to establish set locations for certain items (i.e., keys) such that she always knows where to put them upon entering the house and where to look when she needs them. If she would like to keep certain items out of sight (i.e., a wallet), she could set up a specific hidden place to keep items and use that same place so as to ensure she can find the required item when needed.   Ms. Mullins and her family should be aware that she is demonstrating significant deficits in spatial skills. She is very likely to get easily lost and turned around (even in familiar areas) and even to mis-perceive objects. It is strongly recommended that she not travel independently so that family and/or friends can help her compensate for these weaknesses.  When required to learn new information, Ms. Mullins does best when information is placed within a meaningful context (I.e,. a story).  Learning will be facilitated if it is made personally meaningful for her and is not presented in a rote fashion (i.e., a list of unrelated information or unconnected tasks to accomplish).   Ms. Mullins exhibits memory impairments but does benefit from cues, thus she will do best when provided with reminders to facilitate retrieval of important information.    Ms. Mullins will do best in an environment where a lot of routines are established so that she can follow a regular pattern for her daily or weekly routines.   Ms. Mullins demonstrates intact basic attention but notable memory impairments, thus, she should not be given instructions for tasks any more than a few minutes in the future.  Ms. Mullins should be aware that she may not be able to process information as quickly and efficiently as she once could. Thus she should allow herself extra time to complete tasks and not try and work under extreme time constraints.   Follow-up  Re-evaluation in 18-24 months is recommended. The current test data can be used as a baseline to which future comparisons can be made.         PERTINENT LABS  Following labs were reviewed:    Latest Reference Range & Units 04/01/22 10:29 04/18/22 15:45   Folate >=5.4 ng/mL 12.3     Homocysteine umol/L 4.0 - 12.0 umol/L 10.0     Methylmalonic Acid 0.00 - 0.40 umol/L 0.27     Vitamin B1 Whole Blood Level 70 - 180 nmol/L   104   Vitamin E 5.5 - 18.0 mg/L 12.3     Vitamin E Gamma 0.0 - 6.0 mg/L 0.6                PERTINENT LABS  Following labs were reviewed:  Lab on 09/27/2023   Component Date Value Ref Range Status    Sodium 09/27/2023 135  135 - 145 mmol/L Final    Potassium 09/27/2023 4.3  3.4 - 5.3 mmol/L Final    Chloride 09/27/2023 98  98 - 107 mmol/L Final    Carbon Dioxide (CO2) 09/27/2023 24  22 - 29 mmol/L Final    Anion Gap 09/27/2023 13  7 - 15 mmol/L Final    Urea Nitrogen 09/27/2023 12.4  8.0 - 23.0 mg/dL Final    Creatinine 09/27/2023 0.76  0.51 - 0.95 mg/dL Final    GFR Estimate 09/27/2023 81  >60 mL/min/1.73m2 Final    Calcium 09/27/2023 9.4  8.8 - 10.2 mg/dL Final    Glucose 09/27/2023 88  70 - 99 mg/dL Final    Magnesium 09/27/2023 2.1  1.7 - 2.3 mg/dL Final         Total time spent for face to face visit, reviewing labs/imaging studies, counseling and coordination of care was: 45 Minutes spent on the date of the encounter doing  chart review, review of outside records, review of test results, interpretation of tests, patient visit, documentation, and discussion with family     This note was dictated using voice recognition software.  Any grammatical or context distortions are unintentional and inherent to the software.    Orders Placed This Encounter   Procedures    Hepatic function panel      New Prescriptions    RIVASTIGMINE (EXELON) 1.5 MG CAPSULE    Take 1 capsule (1.5 mg) by mouth 2 times daily for 14 days, THEN 2 capsules (3 mg) 2 times daily for 14 days, THEN 3 capsules (4.5 mg) 2 times daily for 14 days.    RIVASTIGMINE (EXELON) 6 MG CAPSULE    Take 1 capsule (6 mg) by mouth 2 times daily     Modified Medications    Modified Medication Previous Medication    MEMANTINE (NAMENDA) 10 MG TABLET memantine (NAMENDA) 10 MG tablet       Take 1 tablet (10 mg) by mouth 2 times daily    Take 1 tablet (10 mg) by mouth 2 times daily

## 2023-12-13 ENCOUNTER — OFFICE VISIT (OUTPATIENT)
Dept: NEUROLOGY | Facility: CLINIC | Age: 78
End: 2023-12-13
Payer: COMMERCIAL

## 2023-12-13 VITALS
BODY MASS INDEX: 22.85 KG/M2 | HEART RATE: 68 BPM | DIASTOLIC BLOOD PRESSURE: 79 MMHG | WEIGHT: 129 LBS | SYSTOLIC BLOOD PRESSURE: 131 MMHG

## 2023-12-13 DIAGNOSIS — G30.9 MAJOR NEUROCOGNITIVE DISORDER DUE TO ALZHEIMER'S DISEASE (H): ICD-10-CM

## 2023-12-13 DIAGNOSIS — F02.80 MAJOR NEUROCOGNITIVE DISORDER DUE TO ALZHEIMER'S DISEASE (H): ICD-10-CM

## 2023-12-13 DIAGNOSIS — D32.0 BENIGN MENINGIOMA OF BRAIN (H): ICD-10-CM

## 2023-12-13 DIAGNOSIS — T39.95XA ANALGESIC REBOUND HEADACHE: ICD-10-CM

## 2023-12-13 DIAGNOSIS — G44.40 ANALGESIC REBOUND HEADACHE: ICD-10-CM

## 2023-12-13 DIAGNOSIS — G44.221 CHRONIC TENSION-TYPE HEADACHE, INTRACTABLE: Primary | ICD-10-CM

## 2023-12-13 PROCEDURE — 99215 OFFICE O/P EST HI 40 MIN: CPT | Performed by: PSYCHIATRY & NEUROLOGY

## 2023-12-13 RX ORDER — RIVASTIGMINE TARTRATE 1.5 MG/1
CAPSULE ORAL
Qty: 168 CAPSULE | Refills: 0 | Status: SHIPPED | OUTPATIENT
Start: 2023-12-13 | End: 2024-01-24

## 2023-12-13 RX ORDER — RIVASTIGMINE TARTRATE 6 MG/1
6 CAPSULE ORAL 2 TIMES DAILY
Qty: 60 CAPSULE | Refills: 11 | Status: SHIPPED | OUTPATIENT
Start: 2024-01-25 | End: 2024-02-09

## 2023-12-13 RX ORDER — MEMANTINE HYDROCHLORIDE 10 MG/1
10 TABLET ORAL 2 TIMES DAILY
Qty: 60 TABLET | Refills: 11 | Status: SHIPPED | OUTPATIENT
Start: 2023-12-13

## 2023-12-13 RX ORDER — ASCORBIC ACID 1000 MG
TABLET ORAL
COMMUNITY

## 2023-12-13 ASSESSMENT — MONTREAL COGNITIVE ASSESSMENT (MOCA)
VISUOSPATIAL/EXECUTIVE SUBSCORE: 1
12. MEMORY INDEX SCORE: 0
9. REPEAT EACH SENTENCE: 2
11. FOR EACH PAIR OF WORDS, WHAT CATEGORY DO THEY BELONG TO (OUT OF 2): 1
13. ORIENTATION SUBSCORE: 3
8. SERIAL SUBTRACTION OF 7S: 0
4. NAME EACH OF THE THREE ANIMALS SHOWN: 2
WHAT LEVEL OF EDUCATION WAS ATTAINED: 0
WHAT IS THE TOTAL SCORE (OUT OF 30): 11
10. [FLUENCY] NAME WORDS STARTING WITH DESIGNATED LETTER: 0
7. [VIGILENCE] TAP WHEN HEARING DESIGNATED LETTER: 1
6. READ LIST OF DIGITS [FORWARD/BACKWARD]: 1

## 2023-12-13 NOTE — NURSING NOTE
Chief Complaint   Patient presents with    Neurocognitive disorder; likely late onset Alzheimer's     Annual follow up. She feels memory has worsened. She is now having difficulty reading the clock.    Headaches     She wakes up with a headache every morning and every time she wakes up after a nap.     Kylie Gayle LPN on 12/13/2023 at 12:58 PM

## 2023-12-13 NOTE — NURSING NOTE
GARIMA COGNITIVE ASSESSMENT (MOCA)  Version 7.1 Original Version  VISUOSPATIAL/EXECUTIVE               COPY CUBE      [ 0   ]                                [  0  ] DRAW CLOCK (Ten past eleven)  (3 points)    [  1  ]                    [ 0   ]               [ 0   ]       Contour            Numbers     Hands POINTS                  1 / 5   NAMING    [  1 ]                                                                        [  0  ]                                             [  1  ]  Liphilip Mckenzie                                Camel                    2 / 3   MEMORY Read list of words, subject must repeat them. Do 2 trials, even if 1st trial is successful. Do a recall after 5 minutes  FACE VELVET [employer] DREW RED No Points    1st          2nd         ATTENTION Read list of digits (1 digit/sec) Subject has to repeat in the forward order       [  0  ]   2  1  8  5  4                                [  1  ] 7 4 2                          1/2   Read list of letters. The subject must tap with his hand at each letter A. No points if > 2 errors.  [  1  ] F B A C M N A A J K L B A F A K D E A A A J A M O F A A B              1/1   Serial 7 subtraction starting at 100          [   0 ] 93         [  0  ] 86          [ 0   ] 79          [  0  ] 72         [  0  ] 65   4 or 5 correct subtractions: 3 points,  2 or 3 correct: 2 points,  1correct: 1 point,   0 correct: 0 points           0 /3   LANGUAGE Repeat: I only know that Dale is the one to help today. [  1   ]                                      The cat always hid under the couch when dogs were in the room. [ 1  ]               2/2   Fluency: Name maximum number of words in one minute that begin with the letter F                                                                                                                    [   0 ] _8__ (N > 11 words)              0 /1   ABSTRACTION Similarity  between e.g. banana-orange=fruit                                                                   [ 1   ] train-bicycle                      [   0 ] watch-ruler              1/2   DELAYED  RECALL Has to recall words  WITH NO CUE FACE  [  0  ] VELVET  [  0  ] Jew  [  0  ]  DREW  [ 0   ] RED  [ 0   ] Points for UNCUED recall only            0/5           OPTIONAL Category cue           Multiple choice cue          ORIENTATION  [  0  ] Date     [  1  ] Month       [    0] Year      [   1 ] Day      [   1 ] Place        [  0  ] City          3/6   TOTAL  Normal > 26/30 Add 1 point if < 12 years education       11 /30

## 2023-12-13 NOTE — LETTER
2023         RE: Irene Mullins  [address]  [facility] [address]        Dear Colleague,    Thank you for referring your patient, Irene Mullins, to the [facility] [place]. Please see a copy of my visit note below.    NEUROLOGY FOLLOW UP VISIT  NOTE       [facility] [place]  [address] [place] [address]  Tel: [phone]  Fax: [phone]  [email or web address]     Irene Mullins,  1945, MRN [id number]  PCP: Lynda Bal  Date: 2023      ASSESSMENT & PLAN     Visit Diagnosis   Chronic tension-type headache, intractable  Benign meningioma of brain (H)  Major neurocognitive disorder due to Alzheimer's disease (H)  Analgesic rebound headache     Major neurocognitive disorder due to Alzheimer's dementia  78 years old female with history of chronic tension-type headache, depression, IBS with major neurocognitive disorder due to Alzheimer's dementia.  Her MoCA score has declined to 11/30 compared to 1630-year ago.  Previously she did not tolerate Aricept.  I have recommended:    1.  Continue on Namenda 10 mg twice daily.  Prescriptions were filled  2.  Add Exelon gradually increasing the dose to 6 mg twice daily  3.  Check hepatic profile  4.  Patient was told to remain socially active  5.  She has a known middle cranial fossa meningioma and a repeat MRI scan done earlier this year remains unchanged  6.  She also has chronic tension-type headache and takes excessive amount of Tylenol and I suspect has developed rebound headache.  I have told her to minimize the use of over-the-counter pain medication  7.  Follow-up in 1 year    Thank you again for this referral, please feel free to contact me if you have any questions.    Benito Foss MD  [facility]  (Former[facility], [facility]     HISTORY OF PRESENT ILLNESS     Patient is a 78 years old female with chronic tension-type  headache, depression, IBS, right middle cranial fossa meningioma who was initially evaluated on 3/23/2022 for progressive cognitive decline. She scored 16/30 on MoCA and her symptoms strongly suggested the diagnosis of Alzheimer's dementia.  She had MRI of the brain that showed an incidental right middle cranial fossa meningioma.  She had neuropsychological evaluation that suggested major neurocognitive disorder due to multiple etiologies.  Lab work for common causes of cognitive decline included normal vitamin B1, RPR, alpha-tocopherol, methylmalonic acid level, homocystine, folate.  Previously she was started on Namenda 5 mg daily by her primary care physician dose was increased to 10 mg twice daily.  During her last visit she was told to remain socially active and reports ongoing decline in her cognition.  She tends to struggle to get her words out.  She lives at home with her  and her son who lives 1 mile away fixes their meals and takes them to doctors offices.  Patient is able to attend to activities of daily living.  Son is managing their finances.    She also has history of headaches for which she was evaluated in the past. She describes it almost daily headaches during which she gets a sharp shooting pain in different part of the brain.  She was diagnosed with icepick headaches and admits she has been taking Tylenol almost on a daily basis in the morning and evening for long time.  She clearly suffers from depression and anxiety which likely is the underlying cause for her chronic tension type headache resulting in ice pick headaches and now likely have evolved into analgesic rebound headache as she is taking Tylenol almost on a regular basis.  She was told to avoid over-the-counter pain medication, continue Celexa.     PROBLEM LIST   Patient Active Problem List   Diagnosis Code     Calcific tendinitis of shoulder M75.30     Chronic tension-type headache, intractable G44.221     History of basal cell  carcinoma Z85.828     Insomnia G47.00     Irritable bowel syndrome K58.9     Right middle cranial fossa 2.2 x 1.9 x 1.8 cm meningioma D32.0     Osteopenia M85.80     Seborrheic keratosis L82.1     Presbyesophagus K22.89     Depression with anxiety F41.8     Analgesic rebound headache G44.40, T39.95XA     Major neurocognitive disorder due to Alzheimer's disease (H) G30.9, F02.80         PAST MEDICAL & SURGICAL HISTORY     Past Medical History:   Patient  has no past medical history on file.    Surgical History:  She  has a past surgical history that includes Colonoscopy (N/A, 8/31/2022).     SOCIAL HISTORY     Reviewed, and she  reports that she has quit smoking. She has never used smokeless tobacco. She reports that she does not currently use alcohol.     FAMILY HISTORY     Reviewed, and family history includes Coronary Artery Disease in her sister; Diabetes in her sister; Hyperlipidemia in her brother and sister; Hypertension in her mother; Myocardial Infarction in her mother.     ALLERGIES     Allergies   Allergen Reactions     Oxycodone-Acetaminophen      Other reaction(s): Dizziness, GI Upset     Sulfamethoxazole-Trimethoprim      Other reaction(s): Other - Describe In Comment Field  Hot flashes, face and arms red for 1 hour after taking medication     Tramadol Nausea and Vomiting     Valproic Acid      Other reaction(s): Other - Describe In Comment Field  Hair loss, elevated liver enzymes         REVIEW OF SYSTEMS     A 12 point review of system was performed and was negative except as outlined in the history of present illness.     HOME MEDICATIONS     Current Outpatient Rx   Medication Sig Dispense Refill     citalopram (CELEXA) 40 MG tablet Take 1 tablet (40 mg) by mouth daily 90 tablet 3     Ginkgo Biloba 40 MG TABS        melatonin 5 MG tablet Take 5 mg by mouth nightly as needed for sleep       memantine (NAMENDA) 10 MG tablet Take 1 tablet (10 mg) by mouth 2 times daily 60 tablet 11     rivastigmine  (EXELON) 1.5 MG capsule Take 1 capsule (1.5 mg) by mouth 2 times daily for 14 days, THEN 2 capsules (3 mg) 2 times daily for 14 days, THEN 3 capsules (4.5 mg) 2 times daily for 14 days. 168 capsule 0     [START ON 1/25/2024] rivastigmine (EXELON) 6 MG capsule Take 1 capsule (6 mg) by mouth 2 times daily 60 capsule 11     VITAMIN D PO            PHYSICAL EXAM     Vital signs  /79 (BP Location: Left arm, Patient Position: Sitting)   Pulse 68   Wt 58.5 kg (129 lb)   BMI 22.85 kg/m      Weight:   129 lbs 0 oz      3/23/2022    11:13 AM 12/13/2023     1:12 PM   MOCA   Visuospatial/Executive  2 1   Naming 3 2   Attention - Digits 2 1   Attention - Letters 1 1   Attention - Subtraction 1 0   Language - Repeat 1 2   Language - Fluency  0 0   Abstraction 2 1   Delayed Recall 0 0   Orientation 4 3   Education 0 0   MOCA Score 16 11   Administered by:  PAULO Perla LPN       Patient is alert and oriented appears depressed.  Vital signs were reviewed and are documented in electronic medical record.  Neck supple.  Neurologically speech was normal.  Cranial nerves II through XII are intact.  Motor strength 5/5 reflexes 2+ toes downgoing.  She has dysmetria on finger-nose testing.  Intact sensation.  She has a wide-based gait      PERTINENT DIAGNOSTIC STUDIES     Following studies were reviewed:      MRI BRAIN 10/06/2023  Stable meningioma along the right middle cranial fossa  floor.    MRI BRAIN 11/23/2021  1. 2.2 x 1.9 x 1.8 cm extra-axial nodule along the floor of the right  middle cranial fossa consistent with the meningioma described in the  patient's history.  2. Diffuse cerebral volume loss and cerebral white matter changes  consistent with chronic small vessel ischemic disease     NEUROPSYCHOLOGICAL EVALUATION 11/16/2022  Diagnosis: Major Neurocognitive Disorder due to Multiple Etiologies (right sided meningioma and Alzheimer's)     RECOMMENDATIONS:  Driving and Activities of Daily Living  Ms.  Susanna would benefit from help in her daily activities particularly in terms of managing medications (i.e., having a pillbox set up for her and having someone make sure that she is taking her medications regularly and as prescribed). She will also continue to benefit from assistance with managing her finances.   Given her significant spatial deficits, Ms. Mullins is encouraged to continue to refrain from driving.   It is strongly recommended that a family member or close friend accompany Ms. Mullins to all appointments to ensure that accurate information is given to providers and instructions are followed. It will be helpful to present the information in brief, repeated segments and have her repeat back the information in her own words to ensure understanding.   If not already done, completion of paperwork for advance directives and assignment of healthcare and financial power of  should be considered at this time.  Family Resources  It will be increasingly important for Ms. Mullins and her family to have a strong support network in place. The Alzheimer s Association (http://www.alz.org/mnnd 9-044-142-6318) has information about local support groups as well as informational materials.   Physical and Emotional Health  Ms. Mullins will continue to benefit from the use of a cognitive enhancing medication (I.e., Namenda).  Given the severity of Ms. Mullins's spatial deficits, updated neuroimaging could be considered to determine if there has been a change in the meningioma. Her PCP or Neurologist can determine if an updated MRI is appropriate.   It is important that Ms. Mullins continue to adhere to her medication treatment regimen and follow a healthy diet so as to maintain her physical health, as this can have a significant impact on her physical, emotional, and cognitive functioning.   Ms. Mullins appeared rather anxious throughout today's evaluation. She is encouraged to speak with her Neurologist or PCP to determine if an  adjustment or alternative medications may help Ms. Mullins better manage her mood.   In the meantime, behavioral activation techniques such as regular exercise (under the guidance of her physician), recreational activities and regular social interaction (with proper social distancing when indicated) would likely be effective in helping Ms. Mullins to manage her mood.   Memory and Organization  Ms. Mullins is encouraged to continue to engage in stimulating activities, (i.e., reading, card games, puzzles) to keep her cognitively active.   In her daily life, Ms. Mullins will continue to benefit from the use of compensation techniques. That is, she may find it helpful to post reminder notes around the house, make lists, and carry a small calendar so that she can feel more comfortable and confident in her ability to remember information. A daily planner could also be used as a memory book where important information is recorded and organized for future reference.   Ms. Mullins should also create a system to establish set locations for certain items (i.e., keys) such that she always knows where to put them upon entering the house and where to look when she needs them. If she would like to keep certain items out of sight (i.e., a wallet), she could set up a specific hidden place to keep items and use that same place so as to ensure she can find the required item when needed.   Ms. Mullins and her family should be aware that she is demonstrating significant deficits in spatial skills. She is very likely to get easily lost and turned around (even in familiar areas) and even to mis-perceive objects. It is strongly recommended that she not travel independently so that family and/or friends can help her compensate for these weaknesses.  When required to learn new information, Ms. Mullins does best when information is placed within a meaningful context (I.e,. a story).  Learning will be facilitated if it is made personally meaningful for her  and is not presented in a rote fashion (i.e., a list of unrelated information or unconnected tasks to accomplish).   Ms. Mullins exhibits memory impairments but does benefit from cues, thus she will do best when provided with reminders to facilitate retrieval of important information.   Ms. Mullins will do best in an environment where a lot of routines are established so that she can follow a regular pattern for her daily or weekly routines.   Ms. Mullins demonstrates intact basic attention but notable memory impairments, thus, she should not be given instructions for tasks any more than a few minutes in the future.  Ms. Mullins should be aware that she may not be able to process information as quickly and efficiently as she once could. Thus she should allow herself extra time to complete tasks and not try and work under extreme time constraints.   Follow-up  Re-evaluation in 18-24 months is recommended. The current test data can be used as a baseline to which future comparisons can be made.         PERTINENT LABS  Following labs were reviewed:    Latest Reference Range & Units 04/01/22 10:29 04/18/22 15:45   Folate >=5.4 ng/mL 12.3     Homocysteine umol/L 4.0 - 12.0 umol/L 10.0     Methylmalonic Acid 0.00 - 0.40 umol/L 0.27     Vitamin B1 Whole Blood Level 70 - 180 nmol/L   104   Vitamin E 5.5 - 18.0 mg/L 12.3     Vitamin E Gamma 0.0 - 6.0 mg/L 0.6                PERTINENT LABS  Following labs were reviewed:  Lab on 09/27/2023   Component Date Value Ref Range Status     Sodium 09/27/2023 135  135 - 145 mmol/L Final     Potassium 09/27/2023 4.3  3.4 - 5.3 mmol/L Final     Chloride 09/27/2023 98  98 - 107 mmol/L Final     Carbon Dioxide (CO2) 09/27/2023 24  22 - 29 mmol/L Final     Anion Gap 09/27/2023 13  7 - 15 mmol/L Final     Urea Nitrogen 09/27/2023 12.4  8.0 - 23.0 mg/dL Final     Creatinine 09/27/2023 0.76  0.51 - 0.95 mg/dL Final     GFR Estimate 09/27/2023 81  >60 mL/min/1.73m2 Final     Calcium 09/27/2023 9.4  8.8  - 10.2 mg/dL Final     Glucose 09/27/2023 88  70 - 99 mg/dL Final     Magnesium 09/27/2023 2.1  1.7 - 2.3 mg/dL Final         Total time spent for face to face visit, reviewing labs/imaging studies, counseling and coordination of care was: 45 Minutes spent on the date of the encounter doing chart review, review of outside records, review of test results, interpretation of tests, patient visit, documentation, and discussion with family     This note was dictated using voice recognition software.  Any grammatical or context distortions are unintentional and inherent to the software.    Orders Placed This Encounter   Procedures     Hepatic function panel      New Prescriptions    RIVASTIGMINE (EXELON) 1.5 MG CAPSULE    Take 1 capsule (1.5 mg) by mouth 2 times daily for 14 days, THEN 2 capsules (3 mg) 2 times daily for 14 days, THEN 3 capsules (4.5 mg) 2 times daily for 14 days.    RIVASTIGMINE (EXELON) 6 MG CAPSULE    Take 1 capsule (6 mg) by mouth 2 times daily     Modified Medications    Modified Medication Previous Medication    MEMANTINE (NAMENDA) 10 MG TABLET memantine (NAMENDA) 10 MG tablet       Take 1 tablet (10 mg) by mouth 2 times daily    Take 1 tablet (10 mg) by mouth 2 times daily                 Again, thank you for allowing me to participate in the care of your patient.        Sincerely,        Benito Foss MD

## 2023-12-14 ENCOUNTER — LAB (OUTPATIENT)
Dept: LAB | Facility: CLINIC | Age: 78
End: 2023-12-14
Payer: COMMERCIAL

## 2023-12-14 DIAGNOSIS — G30.9 MAJOR NEUROCOGNITIVE DISORDER DUE TO ALZHEIMER'S DISEASE (H): ICD-10-CM

## 2023-12-14 DIAGNOSIS — F02.80 MAJOR NEUROCOGNITIVE DISORDER DUE TO ALZHEIMER'S DISEASE (H): ICD-10-CM

## 2023-12-14 LAB
ALBUMIN SERPL BCG-MCNC: 4.1 G/DL (ref 3.5–5.2)
ALP SERPL-CCNC: 58 U/L (ref 40–150)
ALT SERPL W P-5'-P-CCNC: 13 U/L (ref 0–50)
AST SERPL W P-5'-P-CCNC: 20 U/L (ref 0–45)
BILIRUB DIRECT SERPL-MCNC: <0.2 MG/DL (ref 0–0.3)
BILIRUB SERPL-MCNC: 0.2 MG/DL
PROT SERPL-MCNC: 7 G/DL (ref 6.4–8.3)

## 2023-12-14 PROCEDURE — 36415 COLL VENOUS BLD VENIPUNCTURE: CPT

## 2023-12-14 PROCEDURE — 80076 HEPATIC FUNCTION PANEL: CPT

## 2024-01-21 ENCOUNTER — HOSPITAL ENCOUNTER (EMERGENCY)
Facility: CLINIC | Age: 79
Discharge: HOME | End: 2024-01-22
Attending: EMERGENCY MEDICINE | Admitting: EMERGENCY MEDICINE
Payer: COMMERCIAL

## 2024-01-21 DIAGNOSIS — U07.1 COVID-19 VIRUS INFECTION: ICD-10-CM

## 2024-01-21 LAB
ALBUMIN SERPL BCG-MCNC: 4.3 G/DL (ref 3.5–5.2)
ALP SERPL-CCNC: 60 U/L (ref 40–150)
ALT SERPL W P-5'-P-CCNC: 22 U/L (ref 0–50)
ANION GAP SERPL CALCULATED.3IONS-SCNC: 19 MMOL/L (ref 7–15)
AST SERPL W P-5'-P-CCNC: 46 U/L (ref 0–45)
BASOPHILS # BLD AUTO: 0.1 10E3/UL (ref 0–0.2)
BASOPHILS NFR BLD AUTO: 0 %
BILIRUB SERPL-MCNC: 0.3 MG/DL
BUN SERPL-MCNC: 15.3 MG/DL (ref 8–23)
CALCIUM SERPL-MCNC: 9.4 MG/DL (ref 8.8–10.2)
CHLORIDE SERPL-SCNC: 99 MMOL/L (ref 98–107)
CREAT SERPL-MCNC: 0.78 MG/DL (ref 0.51–0.95)
DEPRECATED HCO3 PLAS-SCNC: 19 MMOL/L (ref 22–29)
EGFRCR SERPLBLD CKD-EPI 2021: 78 ML/MIN/1.73M2
EOSINOPHIL # BLD AUTO: 0.1 10E3/UL (ref 0–0.7)
EOSINOPHIL NFR BLD AUTO: 1 %
ERYTHROCYTE [DISTWIDTH] IN BLOOD BY AUTOMATED COUNT: 13.2 % (ref 10–15)
FLUAV RNA SPEC QL NAA+PROBE: NEGATIVE
FLUBV RNA RESP QL NAA+PROBE: NEGATIVE
GLUCOSE SERPL-MCNC: 172 MG/DL (ref 70–99)
HCT VFR BLD AUTO: 37.5 % (ref 35–47)
HGB BLD-MCNC: 12.4 G/DL (ref 11.7–15.7)
HOLD SPECIMEN: NORMAL
HOLD SPECIMEN: NORMAL
IMM GRANULOCYTES # BLD: 0 10E3/UL
IMM GRANULOCYTES NFR BLD: 0 %
LYMPHOCYTES # BLD AUTO: 1.5 10E3/UL (ref 0.8–5.3)
LYMPHOCYTES NFR BLD AUTO: 12 %
MCH RBC QN AUTO: 30.2 PG (ref 26.5–33)
MCHC RBC AUTO-ENTMCNC: 33.1 G/DL (ref 31.5–36.5)
MCV RBC AUTO: 91 FL (ref 78–100)
MONOCYTES # BLD AUTO: 0.5 10E3/UL (ref 0–1.3)
MONOCYTES NFR BLD AUTO: 4 %
NEUTROPHILS # BLD AUTO: 9.9 10E3/UL (ref 1.6–8.3)
NEUTROPHILS NFR BLD AUTO: 83 %
NRBC # BLD AUTO: 0 10E3/UL
NRBC BLD AUTO-RTO: 0 /100
PLATELET # BLD AUTO: 258 10E3/UL (ref 150–450)
POTASSIUM SERPL-SCNC: 3.4 MMOL/L (ref 3.4–5.3)
PROT SERPL-MCNC: 7.4 G/DL (ref 6.4–8.3)
RBC # BLD AUTO: 4.11 10E6/UL (ref 3.8–5.2)
RSV RNA SPEC NAA+PROBE: NEGATIVE
SARS-COV-2 RNA RESP QL NAA+PROBE: POSITIVE
SODIUM SERPL-SCNC: 137 MMOL/L (ref 135–145)
TROPONIN T SERPL HS-MCNC: 16 NG/L
TROPONIN T SERPL HS-MCNC: 9 NG/L
WBC # BLD AUTO: 12.1 10E3/UL (ref 4–11)

## 2024-01-21 PROCEDURE — 93010 ELECTROCARDIOGRAM REPORT: CPT | Performed by: EMERGENCY MEDICINE

## 2024-01-21 PROCEDURE — 85025 COMPLETE CBC W/AUTO DIFF WBC: CPT | Performed by: EMERGENCY MEDICINE

## 2024-01-21 PROCEDURE — 258N000003 HC RX IP 258 OP 636: Performed by: EMERGENCY MEDICINE

## 2024-01-21 PROCEDURE — 84484 ASSAY OF TROPONIN QUANT: CPT | Mod: 91 | Performed by: EMERGENCY MEDICINE

## 2024-01-21 PROCEDURE — 36415 COLL VENOUS BLD VENIPUNCTURE: CPT | Performed by: EMERGENCY MEDICINE

## 2024-01-21 PROCEDURE — 93005 ELECTROCARDIOGRAM TRACING: CPT | Performed by: EMERGENCY MEDICINE

## 2024-01-21 PROCEDURE — 99284 EMERGENCY DEPT VISIT MOD MDM: CPT | Mod: 25 | Performed by: EMERGENCY MEDICINE

## 2024-01-21 PROCEDURE — 96361 HYDRATE IV INFUSION ADD-ON: CPT | Performed by: EMERGENCY MEDICINE

## 2024-01-21 PROCEDURE — 96374 THER/PROPH/DIAG INJ IV PUSH: CPT | Performed by: EMERGENCY MEDICINE

## 2024-01-21 PROCEDURE — 250N000011 HC RX IP 250 OP 636: Performed by: EMERGENCY MEDICINE

## 2024-01-21 PROCEDURE — 87637 SARSCOV2&INF A&B&RSV AMP PRB: CPT | Performed by: EMERGENCY MEDICINE

## 2024-01-21 PROCEDURE — 82247 BILIRUBIN TOTAL: CPT | Performed by: EMERGENCY MEDICINE

## 2024-01-21 RX ORDER — OLANZAPINE 10 MG/1
2.5 INJECTION, POWDER, LYOPHILIZED, FOR SOLUTION INTRAMUSCULAR ONCE
Status: COMPLETED | OUTPATIENT
Start: 2024-01-21 | End: 2024-01-21

## 2024-01-21 RX ADMIN — OLANZAPINE 2.5 MG: 10 INJECTION, POWDER, FOR SOLUTION INTRAMUSCULAR at 21:18

## 2024-01-21 RX ADMIN — SODIUM CHLORIDE 500 ML: 9 INJECTION, SOLUTION INTRAVENOUS at 21:15

## 2024-01-21 ASSESSMENT — ACTIVITIES OF DAILY LIVING (ADL)
ADLS_ACUITY_SCORE: 35
ADLS_ACUITY_SCORE: 35

## 2024-01-22 ENCOUNTER — PATIENT OUTREACH (OUTPATIENT)
Dept: FAMILY MEDICINE | Facility: CLINIC | Age: 79
End: 2024-01-22
Payer: COMMERCIAL

## 2024-01-22 VITALS
DIASTOLIC BLOOD PRESSURE: 71 MMHG | OXYGEN SATURATION: 96 % | RESPIRATION RATE: 14 BRPM | SYSTOLIC BLOOD PRESSURE: 121 MMHG | TEMPERATURE: 97.9 F | HEART RATE: 73 BPM

## 2024-01-22 RX ORDER — ONDANSETRON 4 MG/1
4 TABLET, ORALLY DISINTEGRATING ORAL EVERY 8 HOURS PRN
Qty: 10 TABLET | Refills: 0 | Status: SHIPPED | OUTPATIENT
Start: 2024-01-22

## 2024-01-22 NOTE — DISCHARGE INSTRUCTIONS
Use ondansetron as needed for nausea.  Use acetaminophen and ibuprofen as needed for symptoms.  Drink plenty fluids.  Get some rest and return if you are having difficulty breathing, lightheadedness, worsening symptoms, or other concerns.  Otherwise follow-up in clinic.

## 2024-01-22 NOTE — TELEPHONE ENCOUNTER
What type of discharge? Emergency Department  Risk of Hospital admission or ED visit: 43.9%  Is a TCM episode required? No  When should the patient follow up with PCP? 7 days of discharge.

## 2024-01-22 NOTE — TELEPHONE ENCOUNTER
Patient Contact     Attempt # 1     Was call answered?  No.  Left message on voicemail with information to call back.       Ansley Sprague RN on 1/22/2024 at 12:28 PM

## 2024-01-22 NOTE — TELEPHONE ENCOUNTER
"  ED for acute condition Discharge Protocol    \"Hi, my name is Miriam Reyes RN, a registered nurse, and I am calling from [facility].  I am calling to follow up and see how things are going for you after your recent emergency visit.\"    Tell me how you are doing now that you are home?\" Doing ok.       Discharge Instructions    \"Let's review your discharge instructions.  What is/are the follow-up recommendations?  Pt. Response: Follow up if needed. Rest, drink plenty of fluids     \"Has an appointment with your primary care provider been scheduled?\"  No (not needed)    Medications    \"Tell me what changed about your medicines when you discharged?\"    None    \"What questions do you have about your medications?\"   None        Call Summary    \"What questions or concerns do you have about your recent visit and your follow-up care?\"     none    \"If you have questions or things don't continue to improve, we encourage you contact us through the main clinic number (give number).  Even if the clinic is not open, triage nurses are available 24/7 to help you.     We would like you to know that our clinic has extended hours (provide information).  We also have urgent care (provide details on closest location and hours/contact info)\"    \"Thank you for your time and take care!\"         "

## 2024-01-22 NOTE — ED PROVIDER NOTES
History     Chief Complaint   Patient presents with    Nausea & Vomiting     HPI  Irene Mullins is a 78 year old female with a history of dementia and chronic headaches who presents for evaluation of dizziness with nausea and vomiting.  Symptoms started this evening.  She is complaining of pain all over.  She says she has chronic headaches and her headache is mild currently.  No double vision.  She denies any chest pain or difficulty breathing.  She has nausea and has been vomiting.  EMS report that they gave ondansetron without improvement.    I spoke with the patient's son who is here with her, he says that the patient became ill this evening and was extremely anxious, tremulous.  He tried to bring her himself but she said she could not walk.    Allergies:  Allergies   Allergen Reactions    Oxycodone-Acetaminophen      Other reaction(s): Dizziness, GI Upset    Sulfamethoxazole-Trimethoprim      Other reaction(s): Other - Describe In Comment Field  Hot flashes, face and arms red for 1 hour after taking medication    Tramadol Nausea and Vomiting    Valproic Acid      Other reaction(s): Other - Describe In Comment Field  Hair loss, elevated liver enzymes       Problem List:    Patient Active Problem List    Diagnosis Date Noted    Major neurocognitive disorder due to Alzheimer's disease (H) 11/29/2022     Priority: Medium    Analgesic rebound headache 03/23/2022     Priority: Medium    Osteopenia 01/31/2017     Priority: Medium    Chronic tension-type headache, intractable 10/20/2016     Priority: Medium    Right middle cranial fossa 2.2 x 1.9 x 1.8 cm meningioma 08/03/2016     Priority: Medium    Calcific tendinitis of shoulder 08/17/2015     Priority: Medium    Depression with anxiety 07/10/2015     Priority: Medium    Insomnia 08/19/2014     Priority: Medium     Overview:   From a visit with Gail Boggs NP on 7/8/13--  Taking amitriptyline since last colonoscopy via EAP for both IBS and sleep 25 mg q hs. Has  not tried melatonin.       Presbyesophagus 08/01/2014     Priority: Medium     Overview:   mild as confirmed by EGD      History of basal cell carcinoma 11/29/2011     Priority: Medium     Overview:   Under bridge of left nare had BCC removed 12/2010, has yearly f/u with dermatology      Seborrheic keratosis 11/29/2011     Priority: Medium    Irritable bowel syndrome 01/30/2008     Priority: Medium        Past Medical History:    No past medical history on file.    Past Surgical History:    Past Surgical History:   Procedure Laterality Date    COLONOSCOPY N/A 8/31/2022    Procedure: COLONOSCOPY;  Surgeon: Ray Cartwright DO;  Location: [facility]       Family History:    Family History   Problem Relation Age of Onset    Hypertension Mother     Myocardial Infarction Mother     Diabetes Sister     Coronary Artery Disease Sister     Hyperlipidemia Sister     Hyperlipidemia Brother     Cerebrovascular Disease No family hx of     Breast Cancer No family hx of     Colon Cancer No family hx of     Prostate Cancer No family hx of        Social History:  Marital Status:   [2]  Social History     Tobacco Use    Smoking status: Former    Smokeless tobacco: Never   Vaping Use    Vaping Use: Never used   Substance Use Topics    Alcohol use: Not Currently     Alcohol/week: 0.0 standard drinks of alcohol        Medications:    ondansetron (ZOFRAN ODT) 4 MG ODT tab  citalopram (CELEXA) 40 MG tablet  Ginkgo Biloba 40 MG TABS  melatonin 5 MG tablet  memantine (NAMENDA) 10 MG tablet  rivastigmine (EXELON) 1.5 MG capsule  [START ON 1/25/2024] rivastigmine (EXELON) 6 MG capsule  VITAMIN D PO          Review of Systems    Physical Exam   BP: (!) 142/74  Pulse: 93  Temp: 97.9  F (36.6  C)  Resp: 22  SpO2: 100 %      Physical Exam  Vitals and nursing note reviewed.   Constitutional:       General: She is in acute distress.      Appearance: She is well-developed. She is not diaphoretic.   HENT:      Head: Normocephalic and  atraumatic.      Right Ear: External ear normal.      Left Ear: External ear normal.      Nose: Nose normal.   Eyes:      General: No scleral icterus.     Conjunctiva/sclera: Conjunctivae normal.   Cardiovascular:      Rate and Rhythm: Normal rate and regular rhythm.      Heart sounds: No murmur heard.  Pulmonary:      Effort: Pulmonary effort is normal. No respiratory distress.      Breath sounds: No stridor.   Abdominal:      General: There is no distension.      Palpations: Abdomen is soft.      Tenderness: There is no abdominal tenderness. There is no guarding.   Musculoskeletal:      Cervical back: Normal range of motion.   Skin:     General: Skin is warm and dry.   Neurological:      Mental Status: She is alert. Mental status is at baseline.      Cranial Nerves: Cranial nerves 2-12 are intact. No facial asymmetry.      Motor: No abnormal muscle tone or pronator drift.      Coordination: Finger-Nose-Finger Test normal. Rapid alternating movements normal.   Psychiatric:         Behavior: Behavior normal.         ED Course                 Procedures              EKG Interpretation:      Interpreted by Long Wells MD  Time reviewed: 2120  Symptoms at time of EKG: Nausea and vomiting   Rhythm: normal sinus   Rate: normal  Axis: normal  Ectopy: none  Conduction: normal  ST Segments/ T Waves: No ST-T wave changes  Q Waves: none  Comparison to prior: No old EKG available    Clinical Impression: normal EKG      Critical Care time:  none               Results for orders placed or performed during the hospital encounter of 01/21/24 (from the past 24 hour(s))   [facility] Draw    Narrative    The following orders were created for panel order [facility] Draw.  Procedure                               Abnormality         Status                     ---------                               -----------         ------                     Extra Blue Top Tube[[id number]]                              Final result                Extra Red Top Tube[[id number]]                               Final result                 Please view results for these tests on the individual orders.   CBC with Platelets & Differential    Narrative    The following orders were created for panel order CBC with Platelets & Differential.  Procedure                               Abnormality         Status                     ---------                               -----------         ------                     CBC with platelets and d...[[id number]]  Abnormal            Final result                 Please view results for these tests on the individual orders.   Comprehensive metabolic panel   Result Value Ref Range    Sodium 137 135 - 145 mmol/L    Potassium 3.4 3.4 - 5.3 mmol/L    Carbon Dioxide (CO2) 19 (L) 22 - 29 mmol/L    Anion Gap 19 (H) 7 - 15 mmol/L    Urea Nitrogen 15.3 8.0 - 23.0 mg/dL    Creatinine 0.78 0.51 - 0.95 mg/dL    GFR Estimate 78 >60 mL/min/1.73m2    Calcium 9.4 8.8 - 10.2 mg/dL    Chloride 99 98 - 107 mmol/L    Glucose 172 (H) 70 - 99 mg/dL    Alkaline Phosphatase 60 40 - 150 U/L    AST 46 (H) 0 - 45 U/L    ALT 22 0 - 50 U/L    Protein Total 7.4 6.4 - 8.3 g/dL    Albumin 4.3 3.5 - 5.2 g/dL    Bilirubin Total 0.3 <=1.2 mg/dL   Troponin T, High Sensitivity   Result Value Ref Range    Troponin T, High Sensitivity 9 <=14 ng/L   Extra Blue Top Tube   Result Value Ref Range    Hold Specimen JIC    Extra Red Top Tube   Result Value Ref Range    Hold Specimen JIC    CBC with platelets and differential   Result Value Ref Range    WBC Count 12.1 (H) 4.0 - 11.0 10e3/uL    RBC Count 4.11 3.80 - 5.20 10e6/uL    Hemoglobin 12.4 11.7 - 15.7 g/dL    Hematocrit 37.5 35.0 - 47.0 %    MCV 91 78 - 100 fL    MCH 30.2 26.5 - 33.0 pg    MCHC 33.1 31.5 - 36.5 g/dL    RDW 13.2 10.0 - 15.0 %    Platelet Count 258 150 - 450 10e3/uL    % Neutrophils 83 %    % Lymphocytes 12 %    % Monocytes 4 %    % Eosinophils 1 %    % Basophils 0 %    % Immature Granulocytes 0 %    NRBCs  per 100 WBC 0 <1 /100    Absolute Neutrophils 9.9 (H) 1.6 - 8.3 10e3/uL    Absolute Lymphocytes 1.5 0.8 - 5.3 10e3/uL    Absolute Monocytes 0.5 0.0 - 1.3 10e3/uL    Absolute Eosinophils 0.1 0.0 - 0.7 10e3/uL    Absolute Basophils 0.1 0.0 - 0.2 10e3/uL    Absolute Immature Granulocytes 0.0 <=0.4 10e3/uL    Absolute NRBCs 0.0 10e3/uL   Troponin T, High Sensitivity   Result Value Ref Range    Troponin T, High Sensitivity 16 (H) <=14 ng/L   Asymptomatic Influenza A/B, RSV, & SARS-CoV2 PCR (COVID-19) Nose    Specimen: Nose; Swab   Result Value Ref Range    Influenza A PCR Negative Negative    Influenza B PCR Negative Negative    RSV PCR Negative Negative    SARS CoV2 PCR Positive (A) Negative    Narrative    Testing was performed using the Xpert Xpress CoV2/Flu/RSV Assay on the [employer] GeneXpert Instrument. This test should be ordered for the detection of SARS-CoV-2, influenza, and RSV viruses in individuals who meet clinical and/or epidemiological criteria. Test performance is unknown in asymptomatic patients. This test is for in vitro diagnostic use under the FDA EUA for laboratories certified under CLIA to perform high or moderate complexity testing. This test has not been FDA cleared or approved. A negative result does not rule out the presence of PCR inhibitors in the specimen or target RNA in concentration below the limit of detection for the assay. If only one viral target is positive but coinfection with multiple targets is suspected, the sample should be re-tested with another FDA cleared, approved, or authorized test, if coinfection would change clinical management. This test was validated by the [facility]. These laboratories are certified under the Clinical Laboratory Improvement Amendments of 1988 (CLIA-88) as qualified to perform high complexity laboratory testing.       Medications   OLANZapine (zyPREXA) IV injection 2.5 mg (2.5 mg Intravenous $Given 1/21/24 2118)   sodium chloride 0.9%  BOLUS 500 mL (0 mLs Intravenous Stopped 1/21/24 2225)       Assessments & Plan (with Medical Decision Making)   78-year-old female with a history of chronic headaches and dementia who presents with dizziness, nausea, vomiting.  She has a normal neurologic examination, no signs of focal central vertigo.  EKG is sinus rhythm without signs of ischemia or dysrhythmia.  She is given IV fluids and olanzapine for her symptoms.  Initial troponin is normal at 9.  Repeat troponin is 16, mildly elevated but not a concerning change from prior.  No signs of ACS.  White blood cell count is mildly elevated 12.1.  He was 12.4, no signs of anemia.  Electrolytes are within normal limits with the exception of mildly elevated anion gap of 19 and mildly low bicarbonate of 19.  Her COVID swab returned positive.  This likely presents the cause of her symptoms and her mild acidosis.  On recheck she is feeling better and is tolerating oral intake here.  She is ambulating at her baseline.  At this time she is safe to discharge home.  I offered her prescription for Paxlovid but the patient's son has declined stating that when she took this before it was making her very sick and they were worried it was causing her to vomit.  Therefore we will not prescribe this medication.  I will prescribe her ondansetron for symptoms and she is safe to discharge with instructions to return if she has worsening of her symptoms or other concerns, otherwise follow-up in clinic.  The patient and her son are in agreement with this plan.    I have reviewed the nursing notes.    I have reviewed the findings, diagnosis, plan and need for follow up with the patient.         New Prescriptions    ONDANSETRON (ZOFRAN ODT) 4 MG ODT TAB    Take 1 tablet (4 mg) by mouth every 8 hours as needed for nausea       Final diagnoses:   COVID-19 virus infection       1/21/2024   [facility] EMERGENCY DEPT       Long Wells MD  01/22/24 0027

## 2024-01-24 DIAGNOSIS — F02.80 MAJOR NEUROCOGNITIVE DISORDER DUE TO ALZHEIMER'S DISEASE (H): ICD-10-CM

## 2024-01-24 DIAGNOSIS — G30.9 MAJOR NEUROCOGNITIVE DISORDER DUE TO ALZHEIMER'S DISEASE (H): ICD-10-CM

## 2024-01-26 RX ORDER — RIVASTIGMINE TARTRATE 1.5 MG/1
CAPSULE ORAL
Qty: 168 CAPSULE | Refills: 0 | OUTPATIENT
Start: 2024-01-26 | End: 2024-03-08

## 2024-01-26 NOTE — TELEPHONE ENCOUNTER
Medication was being titrated. Now on rivatimine 6 mg BID.     Will deny this request and send note to pharmacy.     Nisha FARAH RN, BSN  [facility]

## 2024-01-26 NOTE — TELEPHONE ENCOUNTER
DENY - Refills on file.     Refill request for: Rivastigmine      LOV: 12/13/23  NOV: 12/16/24

## 2024-01-30 ENCOUNTER — OFFICE VISIT (OUTPATIENT)
Dept: FAMILY MEDICINE | Facility: CLINIC | Age: 79
End: 2024-01-30
Payer: COMMERCIAL

## 2024-01-30 VITALS
OXYGEN SATURATION: 99 % | HEART RATE: 72 BPM | HEIGHT: 63 IN | TEMPERATURE: 97.8 F | WEIGHT: 126.13 LBS | BODY MASS INDEX: 22.35 KG/M2 | DIASTOLIC BLOOD PRESSURE: 72 MMHG | SYSTOLIC BLOOD PRESSURE: 98 MMHG | RESPIRATION RATE: 18 BRPM

## 2024-01-30 DIAGNOSIS — G44.221 CHRONIC TENSION-TYPE HEADACHE, INTRACTABLE: Primary | ICD-10-CM

## 2024-01-30 DIAGNOSIS — F02.80 MAJOR NEUROCOGNITIVE DISORDER DUE TO ALZHEIMER'S DISEASE (H): ICD-10-CM

## 2024-01-30 DIAGNOSIS — D32.0 BENIGN MENINGIOMA OF BRAIN (H): ICD-10-CM

## 2024-01-30 DIAGNOSIS — G30.9 MAJOR NEUROCOGNITIVE DISORDER DUE TO ALZHEIMER'S DISEASE (H): ICD-10-CM

## 2024-01-30 PROCEDURE — 99214 OFFICE O/P EST MOD 30 MIN: CPT | Performed by: FAMILY MEDICINE

## 2024-01-30 ASSESSMENT — PATIENT HEALTH QUESTIONNAIRE - PHQ9
10. IF YOU CHECKED OFF ANY PROBLEMS, HOW DIFFICULT HAVE THESE PROBLEMS MADE IT FOR YOU TO DO YOUR WORK, TAKE CARE OF THINGS AT HOME, OR GET ALONG WITH OTHER PEOPLE: SOMEWHAT DIFFICULT
SUM OF ALL RESPONSES TO PHQ QUESTIONS 1-9: 9
SUM OF ALL RESPONSES TO PHQ QUESTIONS 1-9: 9

## 2024-01-30 ASSESSMENT — PAIN SCALES - GENERAL: PAINLEVEL: MODERATE PAIN (5)

## 2024-01-30 NOTE — PROGRESS NOTES
"  Assessment & Plan     Chronic tension-type headache, intractable  Headache is daily and persistent.  Worse in the morning and on waking from naps.  Previously referred to sleep to see if she could have some overnight hypoxemia or JIMENEZ that is contributing. She has that apt next week.     Declined offer for PT or seeing pain medicine for consideration of botox injections for the tight upper traps and occipital muscles.     Major neurocognitive disorder due to Alzheimer's disease (H)  Seeing neurologist  On rivastigmine and namenda      (D32.0) Benign meningioma of brain (H)  Comment: this has been stable. Last MRI in October.    Plan:            Subjective   Irene is a 78 year old, presenting for the following health issues:  Headache        1/30/2024    11:38 AM   Additional Questions   Roomed by Saray kirk CMA   Accompanied by Son     CAROLA     Headaches    Duration: \" a long time\", worsening with time. PMHx meningioma.and Alzheimer's    Description  Location: entire facial area and bilateral temples   Character: throbbing pain, dull pain, sharp pain, squeezing pain  Frequency:  daily headaches, worse in the mornings  Duration:  after napping in the afternoons pain is somewhat better  Intensity:  moderate  Accompanying signs and symptoms:  Precipitating or Alleviating factors:  Nausea/vomiting: sometimes  Dizziness: always  Weakness or numbness: no  Visual changes: none  Fever: no   Sinus or URI symptoms no   History  Head trauma: no   Family history of migraines: no  Previous tests for headaches: no  Neurologist evaluations: YES  Able to do daily activities when headache present: YES  Wake with headaches: YES  Daily pain medication use: YES- Tylenol  Any changes in: none  Precipitating or Alleviating factors (light/sound/sleep/caffeine): napping helps  Therapies tried and outcome: Tylenol  Frequent/daily pain medication use: daily    Here with her son today.    Frustrated by lack of answers and ongoing headaches.        " "   Objective    BP 98/72   Pulse 72   Temp 97.8  F (36.6  C) (Tympanic)   Resp 18   Ht 1.6 m (5' 3\")   Wt 57.2 kg (126 lb 2 oz)   SpO2 99%   BMI 22.34 kg/m    Body mass index is 22.34 kg/m .  Physical Exam   GENERAL: alert and no distress  NEURO: Normal strength and tone, sensory exam grossly normal, and oriented x 3 today, overall doing well. Checks with son on answers to some questions, but overall memory seems pretty good today.            Signed Electronically by: Lynda Bal MD    Answers submitted by the patient for this visit:  Patient Health Questionnaire (Submitted on 1/30/2024)  If you checked off any problems, how difficult have these problems made it for you to do your work, take care of things at home, or get along with other people?: Somewhat difficult  PHQ9 TOTAL SCORE: 9    "

## 2024-02-03 ENCOUNTER — HOSPITAL ENCOUNTER (EMERGENCY)
Facility: CLINIC | Age: 79
Discharge: HOME | End: 2024-02-03
Attending: FAMILY MEDICINE | Admitting: FAMILY MEDICINE
Payer: COMMERCIAL

## 2024-02-03 VITALS
HEART RATE: 68 BPM | SYSTOLIC BLOOD PRESSURE: 141 MMHG | WEIGHT: 126 LBS | RESPIRATION RATE: 18 BRPM | DIASTOLIC BLOOD PRESSURE: 78 MMHG | BODY MASS INDEX: 22.32 KG/M2 | OXYGEN SATURATION: 96 % | TEMPERATURE: 97.2 F

## 2024-02-03 DIAGNOSIS — E86.0 DEHYDRATION: ICD-10-CM

## 2024-02-03 DIAGNOSIS — U07.1 COVID-19: ICD-10-CM

## 2024-02-03 DIAGNOSIS — K59.00 CONSTIPATION, UNSPECIFIED CONSTIPATION TYPE: ICD-10-CM

## 2024-02-03 DIAGNOSIS — R42 LIGHTHEADEDNESS: ICD-10-CM

## 2024-02-03 LAB
ALBUMIN SERPL BCG-MCNC: 4.2 G/DL (ref 3.5–5.2)
ALBUMIN UR-MCNC: NEGATIVE MG/DL
ALP SERPL-CCNC: 66 U/L (ref 40–150)
ALT SERPL W P-5'-P-CCNC: 15 U/L (ref 0–50)
ANION GAP SERPL CALCULATED.3IONS-SCNC: 13 MMOL/L (ref 7–15)
APPEARANCE UR: CLEAR
AST SERPL W P-5'-P-CCNC: 20 U/L (ref 0–45)
BASOPHILS # BLD AUTO: 0.1 10E3/UL (ref 0–0.2)
BASOPHILS NFR BLD AUTO: 1 %
BILIRUB SERPL-MCNC: 0.3 MG/DL
BILIRUB UR QL STRIP: NEGATIVE
BUN SERPL-MCNC: 13.8 MG/DL (ref 8–23)
CALCIUM SERPL-MCNC: 9.6 MG/DL (ref 8.8–10.2)
CHLORIDE SERPL-SCNC: 99 MMOL/L (ref 98–107)
COLOR UR AUTO: YELLOW
CREAT SERPL-MCNC: 0.85 MG/DL (ref 0.51–0.95)
DEPRECATED HCO3 PLAS-SCNC: 23 MMOL/L (ref 22–29)
EGFRCR SERPLBLD CKD-EPI 2021: 71 ML/MIN/1.73M2
EOSINOPHIL # BLD AUTO: 0.1 10E3/UL (ref 0–0.7)
EOSINOPHIL NFR BLD AUTO: 1 %
ERYTHROCYTE [DISTWIDTH] IN BLOOD BY AUTOMATED COUNT: 13.1 % (ref 10–15)
GLUCOSE SERPL-MCNC: 147 MG/DL (ref 70–99)
GLUCOSE UR STRIP-MCNC: NEGATIVE MG/DL
HCT VFR BLD AUTO: 37.9 % (ref 35–47)
HGB BLD-MCNC: 12.4 G/DL (ref 11.7–15.7)
HGB UR QL STRIP: ABNORMAL
IMM GRANULOCYTES # BLD: 0 10E3/UL
IMM GRANULOCYTES NFR BLD: 0 %
KETONES UR STRIP-MCNC: NEGATIVE MG/DL
LEUKOCYTE ESTERASE UR QL STRIP: NEGATIVE
LYMPHOCYTES # BLD AUTO: 2.4 10E3/UL (ref 0.8–5.3)
LYMPHOCYTES NFR BLD AUTO: 25 %
MCH RBC QN AUTO: 30.4 PG (ref 26.5–33)
MCHC RBC AUTO-ENTMCNC: 32.7 G/DL (ref 31.5–36.5)
MCV RBC AUTO: 93 FL (ref 78–100)
MONOCYTES # BLD AUTO: 0.6 10E3/UL (ref 0–1.3)
MONOCYTES NFR BLD AUTO: 6 %
MUCOUS THREADS #/AREA URNS LPF: PRESENT /LPF
NEUTROPHILS # BLD AUTO: 6.6 10E3/UL (ref 1.6–8.3)
NEUTROPHILS NFR BLD AUTO: 67 %
NITRATE UR QL: NEGATIVE
NRBC # BLD AUTO: 0 10E3/UL
NRBC BLD AUTO-RTO: 0 /100
PH UR STRIP: 6 [PH] (ref 5–7)
PLATELET # BLD AUTO: 308 10E3/UL (ref 150–450)
POTASSIUM SERPL-SCNC: 3.9 MMOL/L (ref 3.4–5.3)
PROT SERPL-MCNC: 7.4 G/DL (ref 6.4–8.3)
RBC # BLD AUTO: 4.08 10E6/UL (ref 3.8–5.2)
RBC URINE: 3 /HPF
SODIUM SERPL-SCNC: 135 MMOL/L (ref 135–145)
SP GR UR STRIP: 1.01 (ref 1–1.03)
SQUAMOUS EPITHELIAL: <1 /HPF
UROBILINOGEN UR STRIP-MCNC: NORMAL MG/DL
WBC # BLD AUTO: 9.7 10E3/UL (ref 4–11)
WBC URINE: 2 /HPF

## 2024-02-03 PROCEDURE — 258N000003 HC RX IP 258 OP 636: Performed by: FAMILY MEDICINE

## 2024-02-03 PROCEDURE — 85004 AUTOMATED DIFF WBC COUNT: CPT | Performed by: EMERGENCY MEDICINE

## 2024-02-03 PROCEDURE — 96361 HYDRATE IV INFUSION ADD-ON: CPT | Performed by: FAMILY MEDICINE

## 2024-02-03 PROCEDURE — 99284 EMERGENCY DEPT VISIT MOD MDM: CPT | Mod: 25 | Performed by: FAMILY MEDICINE

## 2024-02-03 PROCEDURE — 96374 THER/PROPH/DIAG INJ IV PUSH: CPT | Performed by: FAMILY MEDICINE

## 2024-02-03 PROCEDURE — 81001 URINALYSIS AUTO W/SCOPE: CPT | Performed by: FAMILY MEDICINE

## 2024-02-03 PROCEDURE — 80053 COMPREHEN METABOLIC PANEL: CPT | Performed by: FAMILY MEDICINE

## 2024-02-03 PROCEDURE — 36415 COLL VENOUS BLD VENIPUNCTURE: CPT | Performed by: EMERGENCY MEDICINE

## 2024-02-03 PROCEDURE — 80053 COMPREHEN METABOLIC PANEL: CPT | Performed by: EMERGENCY MEDICINE

## 2024-02-03 PROCEDURE — 99284 EMERGENCY DEPT VISIT MOD MDM: CPT | Performed by: FAMILY MEDICINE

## 2024-02-03 PROCEDURE — 250N000011 HC RX IP 250 OP 636: Performed by: FAMILY MEDICINE

## 2024-02-03 PROCEDURE — 85025 COMPLETE CBC W/AUTO DIFF WBC: CPT | Performed by: FAMILY MEDICINE

## 2024-02-03 RX ORDER — ONDANSETRON 2 MG/ML
4 INJECTION INTRAMUSCULAR; INTRAVENOUS ONCE
Status: COMPLETED | OUTPATIENT
Start: 2024-02-03 | End: 2024-02-03

## 2024-02-03 RX ADMIN — ONDANSETRON 4 MG: 2 INJECTION INTRAMUSCULAR; INTRAVENOUS at 17:00

## 2024-02-03 RX ADMIN — SODIUM CHLORIDE 1000 ML: 9 INJECTION, SOLUTION INTRAVENOUS at 17:01

## 2024-02-03 ASSESSMENT — ACTIVITIES OF DAILY LIVING (ADL)
ADLS_ACUITY_SCORE: 35
ADLS_ACUITY_SCORE: 35

## 2024-02-03 NOTE — ED TRIAGE NOTES
General weakness, nausea, urinary frequency, and dizziness since having COVID a couple of weeks ago.     Triage Assessment (Adult)       Row Name 02/03/24 1362          Triage Assessment    Airway WDL WDL        Respiratory WDL    Respiratory WDL WDL        Cardiac WDL    Cardiac WDL WDL        Peripheral/Neurovascular WDL    Peripheral Neurovascular WDL WDL        Cognitive/Neuro/Behavioral WDL    Cognitive/Neuro/Behavioral WDL WDL        Susana Coma Scale    Best Eye Response 4-->(E4) spontaneous     Best Motor Response 6-->(M6) obeys commands     Best Verbal Response 5-->(V5) oriented     [place] [address] Coma Scale Score 15

## 2024-02-03 NOTE — ED PROVIDER NOTES
HPI   Patient is a 78-year-old female presenting by private car with her son for concerns of weakness and nausea.  She was diagnosed with COVID-19 on 1/21, 13 days ago.  She was diagnosed with a tension headache on 1/30, 3 days ago.  She has a history of meningioma.  She has a history of IBS.  She has Alzheimer's disease.  She lives in a private home with her .  Her  had called the patient's son to come and pick her up and bring her to the ED for evaluation.    The patient commiserates about her diagnosis of COVID almost 2 weeks ago.  She tells me that she felt like she was going to die when she presented at that time.  She is thankful that she began to feel better and was back to baseline.  However, yesterday or today she began to feel sick again.  Today she has had a poor appetite and has taken only some toast.  She has had some nausea off and on during the day.  No vomiting.  She feels her abdomen gurgling.  She denies abdominal pain.  She does have constipation recently, having missed a bowel movement for 6 days and then having a large forced stool output 2 days ago.  No hematochezia or melena.  She does have urinary frequency.  No dysuria.  No vaginal bleeding or discharge.  No fever.  No back or flank pain.  No trauma or injury.  No chest symptoms.  No cough or congestion.      Allergies:  Allergies   Allergen Reactions    Oxycodone-Acetaminophen      Other reaction(s): Dizziness, GI Upset    Sulfamethoxazole-Trimethoprim      Other reaction(s): Other - Describe In Comment Field  Hot flashes, face and arms red for 1 hour after taking medication    Tramadol Nausea and Vomiting    Valproic Acid      Other reaction(s): Other - Describe In Comment Field  Hair loss, elevated liver enzymes     Problem List:    Patient Active Problem List    Diagnosis Date Noted    Major neurocognitive disorder due to Alzheimer's disease (H) 11/29/2022     Priority: Medium    Analgesic rebound headache 03/23/2022      Priority: Medium    Osteopenia 01/31/2017     Priority: Medium    Chronic tension-type headache, intractable 10/20/2016     Priority: Medium    Right middle cranial fossa 2.2 x 1.9 x 1.8 cm meningioma 08/03/2016     Priority: Medium    Calcific tendinitis of shoulder 08/17/2015     Priority: Medium    Depression with anxiety 07/10/2015     Priority: Medium    Insomnia 08/19/2014     Priority: Medium     Overview:   From a visit with Gail Boggs NP on 7/8/13--  Taking amitriptyline since last colonoscopy via EAP for both IBS and sleep 25 mg q hs. Has not tried melatonin.       Presbyesophagus 08/01/2014     Priority: Medium     Overview:   mild as confirmed by EGD      History of basal cell carcinoma 11/29/2011     Priority: Medium     Overview:   Under bridge of left nare had BCC removed 12/2010, has yearly f/u with dermatology      Seborrheic keratosis 11/29/2011     Priority: Medium    Irritable bowel syndrome 01/30/2008     Priority: Medium      Past Medical History:    No past medical history on file.  Past Surgical History:    Past Surgical History:   Procedure Laterality Date    COLONOSCOPY N/A 8/31/2022    Procedure: COLONOSCOPY;  Surgeon: Ray Cartwright DO;  Location: [facility]     Family History:    Family History   Problem Relation Age of Onset    Hypertension Mother     Myocardial Infarction Mother     Diabetes Sister     Coronary Artery Disease Sister     Hyperlipidemia Sister     Hyperlipidemia Brother     Cerebrovascular Disease No family hx of     Breast Cancer No family hx of     Colon Cancer No family hx of     Prostate Cancer No family hx of      Social History:  Marital Status:   [2]  Social History     Tobacco Use    Smoking status: Former    Smokeless tobacco: Never   Vaping Use    Vaping Use: Never used   Substance Use Topics    Alcohol use: Not Currently     Alcohol/week: 0.0 standard drinks of alcohol      Medications:    citalopram (CELEXA) 40 MG tablet  Ginkgo Biloba 40 MG  TABS  melatonin 5 MG tablet  memantine (NAMENDA) 10 MG tablet  ondansetron (ZOFRAN ODT) 4 MG ODT tab  rivastigmine (EXELON) 6 MG capsule  VITAMIN D PO      Review of Systems   All other systems reviewed and are negative.      PE   BP: 138/84  Pulse: 82  Temp: 97.2  F (36.2  C)  Resp: 18  Weight: 57.2 kg (126 lb)  SpO2: 98 %  Physical Exam  Vitals reviewed.   Constitutional:       General: She is not in acute distress.     Appearance: She is well-developed.   HENT:      Head: Normocephalic and atraumatic.      Right Ear: External ear normal.      Left Ear: External ear normal.      Nose: Nose normal.      Mouth/Throat:      Mouth: Mucous membranes are moist.      Pharynx: Oropharynx is clear.   Eyes:      Extraocular Movements: Extraocular movements intact.      Conjunctiva/sclera: Conjunctivae normal.      Pupils: Pupils are equal, round, and reactive to light.   Cardiovascular:      Rate and Rhythm: Normal rate and regular rhythm.   Pulmonary:      Effort: Pulmonary effort is normal.   Abdominal:      Palpations: Abdomen is soft.      Tenderness: There is no abdominal tenderness.   Musculoskeletal:         General: Normal range of motion.      Cervical back: Normal range of motion.   Skin:     General: Skin is warm and dry.   Neurological:      Mental Status: She is alert and oriented to person, place, and time.   Psychiatric:         Behavior: Behavior normal.         ED COURSE and [facility]   1637.  Patient has symptoms and signs as described above.  Urine analysis and blood work pending.  Fluid bolus, Zofran.  Constipation?  UTI?  Her abdomen is soft and not tender which is encouraging.  Her blood work is largely unremarkable which is encouraging.  No indication for CT imaging.    1909.  Patient denies feeling better.  She complains of dizziness since COVID.  Perhaps this is related still.  That said, she has had decreased appetite and fluid intake per family.  I think she is probably on the dry side.  She has been  constipated which has contributed to her decreased appetite and transient nausea.  MiraLAX recommended.  She does report more stool since 2 days ago.  No emergent need for imaging.  Low concern for other pathology, follow-up recommended.  Referral order placed.    Electronic medical chart reviewed, including medical problems, medications, medical allergies, social history.  Recent hospitalizations and surgical procedures reviewed.  Recent clinic visits and consultations reviewed.  Recent labs and test results reviewed.  Nursing notes reviewed.    The patient, their parent if applicable, and/or their medical decision maker(s) and I have reviewed all of the available historical information, applicable PMH, physical exam findings, and objective diagnostic data gathered during this ED visit.  We then discussed all work-up options and then together agreed upon the course taken during this visit.  The ultimate disposition and plan was a cooperative decision made between myself and the patient, their parent if applicable, and/or their legal decision maker(s).  The risks and benefits of all decisions made during this visit were discussed to the best of my abilities given the circumstances, and all parties are understanding of the pertinent ramifications of these decisions.      LABS  Labs Ordered and Resulted from Time of ED Arrival to Time of ED Departure   COMPREHENSIVE METABOLIC PANEL - Abnormal       Result Value    Sodium 135      Potassium 3.9      Carbon Dioxide (CO2) 23      Anion Gap 13      Urea Nitrogen 13.8      Creatinine 0.85      GFR Estimate 71      Calcium 9.6      Chloride 99      Glucose 147 (*)     Alkaline Phosphatase 66      AST 20      ALT 15      Protein Total 7.4      Albumin 4.2      Bilirubin Total 0.3     ROUTINE UA WITH MICROSCOPIC REFLEX TO CULTURE - Abnormal    Color Urine Yellow      Appearance Urine Clear      Glucose Urine Negative      Bilirubin Urine Negative      Ketones Urine Negative       Specific Gravity Urine 1.012      Blood Urine Small (*)     pH Urine 6.0      Protein Albumin Urine Negative      Urobilinogen Urine Normal      Nitrite Urine Negative      Leukocyte Esterase Urine Negative      Mucus Urine Present (*)     RBC Urine 3 (*)     WBC Urine 2      Squamous Epithelials Urine <1     CBC WITH PLATELETS AND DIFFERENTIAL    WBC Count 9.7      RBC Count 4.08      Hemoglobin 12.4      Hematocrit 37.9      MCV 93      MCH 30.4      MCHC 32.7      RDW 13.1      Platelet Count 308      % Neutrophils 67      % Lymphocytes 25      % Monocytes 6      % Eosinophils 1      % Basophils 1      % Immature Granulocytes 0      NRBCs per 100 WBC 0      Absolute Neutrophils 6.6      Absolute Lymphocytes 2.4      Absolute Monocytes 0.6      Absolute Eosinophils 0.1      Absolute Basophils 0.1      Absolute Immature Granulocytes 0.0      Absolute NRBCs 0.0         IMAGING  Images reviewed by me.  Radiology report also reviewed.  No orders to display       Procedures    Medications   sodium chloride 0.9% BOLUS 1,000 mL (1,000 mLs Intravenous $New Bag 2/3/24 1701)   ondansetron (ZOFRAN) injection 4 mg (4 mg Intravenous $Given 2/3/24 1700)         IMPRESSION       ICD-10-CM    1. Dehydration  E86.0       2. Lightheadedness  R42       3. COVID-19  U07.1       4. Constipation, unspecified constipation type  K59.00                Medication List      There are no discharge medications for this visit.                       Jose Antonio Liao MD  02/03/24 5010

## 2024-02-04 NOTE — DISCHARGE INSTRUCTIONS
RETURN TO THE EMERGENCY ROOM FOR THE FOLLOWING:    Severely worsened pain, fever greater than 101, new trouble with breathing, fainting, concerns for worsening dehydration, or at anytime for any concern.    FOLLOW UP:    Primary care follow-up recommended, referral order placed at the time of discharge.  Expect a phone call within the next few days to help with scheduling.    TREATMENT RECOMMENDATIONS:    Consider MiraLAX to help promote a regular stool pattern.  The goal is to have a soft, easy stool either every day or every other day.  You can titrate MiraLAX to achieve this goal.  MiraLAX is a nonsoluble fiber and is not addictive.  It will not cause bowel dysfunction or pathology.      NURSE ADVICE LINE:  [phone] or [phone]

## 2024-02-07 ENCOUNTER — TELEPHONE (OUTPATIENT)
Dept: NEUROLOGY | Facility: CLINIC | Age: 79
End: 2024-02-07
Payer: COMMERCIAL

## 2024-02-07 DIAGNOSIS — G30.9 MAJOR NEUROCOGNITIVE DISORDER DUE TO ALZHEIMER'S DISEASE (H): ICD-10-CM

## 2024-02-07 DIAGNOSIS — F02.80 MAJOR NEUROCOGNITIVE DISORDER DUE TO ALZHEIMER'S DISEASE (H): ICD-10-CM

## 2024-02-07 NOTE — TELEPHONE ENCOUNTER
[facility] Call Center    Phone Message    May a detailed message be left on voicemail: yes     Reason for Call: Medication Question or concern regarding medication   Prescription Clarification  Name of Medication:   rivastigmine (EXELON)  Prescribing Provider:   Benito Foss MD   Pharmacy: N/A   What on the order needs clarification? Ian say the Pt has had severe stomach issues ever since the change in the dosage, he is asking for her no no longer usage of the medication or some thing else to be prescribed.    Please call Ian @   [phone] to discuss further.      Action Taken: Message routed to:  [facility]    Travel Screening: Not Applicable

## 2024-02-08 NOTE — TELEPHONE ENCOUNTER
Called and spoke with patient's spouse who reports patient has not tolerated recent increases to the rivastigmine.    Patient had followed titration schedule with the 1.5 mg BID dosing and tolerated until at the 4.5 mg BIB dose (this is when GI complications began). Patient has held off on taking rivastigmine today, and would like direction from provider. Also complicating circumstances is patient recently had Covid, and they are unsure if this contributed.    MD to please advise on appropriate dosing or tapering down/discontinuation of this medication. Patient did tolerate at the 3 mg BID dosing, but would need new Rx or halve existing pills.    Please advise.    Tiago Nevarez, RN, BSN  [facility]

## 2024-02-08 NOTE — TELEPHONE ENCOUNTER
Called and left message for patient's spouse, requested a call back to clinic to discuss questions/concerns about discontinuation of rivastigmine and that this should not happen abruptly, and to determine when stomach issues began.    Tiago Nevarez RN, BSN  [facility]

## 2024-02-09 RX ORDER — RIVASTIGMINE TARTRATE 3 MG/1
3 CAPSULE ORAL 2 TIMES DAILY
Qty: 60 CAPSULE | Refills: 11 | Status: SHIPPED | OUTPATIENT
Start: 2024-02-09

## 2024-02-09 NOTE — TELEPHONE ENCOUNTER
Called and left message for patient/spouse, about new Rx sent to pharmacy for 3mg BID dosing of the rivastigmine. Encouraged to call back with questions.    Tiago Nevarez RN, BSN  [facility]

## 2024-02-22 ENCOUNTER — ANCILLARY PROCEDURE (OUTPATIENT)
Dept: GENERAL RADIOLOGY | Facility: CLINIC | Age: 79
End: 2024-02-22
Attending: FAMILY MEDICINE
Payer: COMMERCIAL

## 2024-02-22 ENCOUNTER — OFFICE VISIT (OUTPATIENT)
Dept: FAMILY MEDICINE | Facility: CLINIC | Age: 79
End: 2024-02-22
Payer: COMMERCIAL

## 2024-02-22 VITALS
OXYGEN SATURATION: 99 % | RESPIRATION RATE: 20 BRPM | SYSTOLIC BLOOD PRESSURE: 100 MMHG | HEIGHT: 63 IN | WEIGHT: 123.38 LBS | DIASTOLIC BLOOD PRESSURE: 68 MMHG | TEMPERATURE: 97.6 F | BODY MASS INDEX: 21.86 KG/M2 | HEART RATE: 71 BPM

## 2024-02-22 DIAGNOSIS — K21.9 GASTROESOPHAGEAL REFLUX DISEASE WITHOUT ESOPHAGITIS: ICD-10-CM

## 2024-02-22 DIAGNOSIS — G47.09 OTHER INSOMNIA: ICD-10-CM

## 2024-02-22 DIAGNOSIS — R07.89 CHEST WALL PAIN: ICD-10-CM

## 2024-02-22 DIAGNOSIS — R07.89 CHEST WALL PAIN: Primary | ICD-10-CM

## 2024-02-22 PROCEDURE — 71046 X-RAY EXAM CHEST 2 VIEWS: CPT | Mod: TC | Performed by: RADIOLOGY

## 2024-02-22 PROCEDURE — 99214 OFFICE O/P EST MOD 30 MIN: CPT | Performed by: FAMILY MEDICINE

## 2024-02-22 RX ORDER — OMEPRAZOLE 40 MG/1
40 CAPSULE, DELAYED RELEASE ORAL DAILY
Qty: 90 CAPSULE | Refills: 1 | Status: SHIPPED | OUTPATIENT
Start: 2024-02-22 | End: 2024-05-17

## 2024-02-22 RX ORDER — TRAZODONE HYDROCHLORIDE 50 MG/1
25 TABLET, FILM COATED ORAL AT BEDTIME
Qty: 45 TABLET | Refills: 1 | Status: SHIPPED | OUTPATIENT
Start: 2024-02-22 | End: 2024-05-17

## 2024-02-22 RX ORDER — RESPIRATORY SYNCYTIAL VIRUS VACCINE 120MCG/0.5
0.5 KIT INTRAMUSCULAR ONCE
Qty: 1 EACH | Refills: 0 | Status: CANCELLED | OUTPATIENT
Start: 2024-02-22 | End: 2024-02-22

## 2024-02-22 ASSESSMENT — PAIN SCALES - GENERAL: PAINLEVEL: NO PAIN (0)

## 2024-02-22 NOTE — PROGRESS NOTES
Assessment & Plan     Chest wall pain  Left upper chest - suspect costochondritis  Xray negative.  - XR Chest 2 Views; Future    Dizziness  Etiology uncertain, both the namenda and the rivastigmine could be causing dizziness  Encouraged her to use a walker regularly  She should increase fluid intake and can liberalize salt intake.     Gastroesophageal reflux disease without esophagitis  Increased burping/belching.    Add PPI to see if this improves symptoms  Patient agreeable  - omeprazole (PRILOSEC) 40 MG DR capsule; Take 1 capsule (40 mg) by mouth daily    Other insomnia   asks if we can give her something for sleep disturbance  risks, benefits and alternatives discussed    Discussed side effects  - traZODone (DESYREL) 50 MG tablet; Take 0.5 tablets (25 mg) by mouth at bedtime    Kacy Bonilla is a 78 year old, presenting for the following health issues:  Chest Pain        2/22/2024     2:58 PM   Additional Questions   Roomed by Saray kirk CMA   Accompanied by      HPI       Chest Pain  Onset/Duration: Intermittent x1 month, onlt occurring if laying on left side or when going from sitting to standing. She also notes dizziness. She does have ringing in the ears. She also has had headache and burping. PMHx vertigo  Description:   Location: left side  Character: achey  Radiation: None  Duration: seconds to a minute  Intensity: moderate  Progression of Symptoms: intermittent  Accompanying Signs & Symptoms:  Shortness of breath: No  Sweating: YES  Nausea/vomiting: No  Lightheadedness: No  Palpitations: YES  Fever/Chills: No  Cough: No           Heartburn: YES  History:   Family history of heart disease: No  Tobacco use: No  Previous similar symptoms: no   Precipitating factors:   Worse with exertion: No  Worse with deep breaths: No           Related to eating: No           Better with burping: YES  Alleviating factors: None  Therapies tried and outcome: nothing        Review of Systems  Constitutional,  "HEENT, cardiovascular, pulmonary, gi and gu systems are negative, except as otherwise noted.      Objective    /68   Pulse 71   Temp 97.6  F (36.4  C) (Tympanic)   Resp 20   Ht 1.6 m (5' 3\")   Wt 56 kg (123 lb 6 oz)   SpO2 99%   BMI 21.85 kg/m    Body mass index is 21.85 kg/m .  Physical Exam   GENERAL: alert and no distress  NECK: no adenopathy, no asymmetry, masses, or scars  RESP: lungs clear to auscultation - no rales, rhonchi or wheezes  CV: regular rate and rhythm, normal S1 S2, no S3 or S4, no murmur, click or rub, no peripheral edema  MS: no gross musculoskeletal defects noted, no edema  PSYCH: anxious and poor memory, overall doing okay but loses train of thought at times.     Chest x ray: negative      Signed Electronically by: Lynda Bal MD    "

## 2024-04-21 ENCOUNTER — HOSPITAL ENCOUNTER (EMERGENCY)
Facility: CLINIC | Age: 79
Discharge: HOME | End: 2024-04-21
Attending: EMERGENCY MEDICINE | Admitting: EMERGENCY MEDICINE
Payer: COMMERCIAL

## 2024-04-21 VITALS
OXYGEN SATURATION: 99 % | DIASTOLIC BLOOD PRESSURE: 65 MMHG | RESPIRATION RATE: 16 BRPM | SYSTOLIC BLOOD PRESSURE: 115 MMHG | HEART RATE: 68 BPM | TEMPERATURE: 97.9 F

## 2024-04-21 DIAGNOSIS — F02.80 MAJOR NEUROCOGNITIVE DISORDER DUE TO ALZHEIMER'S DISEASE (H): ICD-10-CM

## 2024-04-21 DIAGNOSIS — R51.9 ACUTE NONINTRACTABLE HEADACHE, UNSPECIFIED HEADACHE TYPE: ICD-10-CM

## 2024-04-21 DIAGNOSIS — G30.9 MAJOR NEUROCOGNITIVE DISORDER DUE TO ALZHEIMER'S DISEASE (H): ICD-10-CM

## 2024-04-21 DIAGNOSIS — R11.0 NAUSEA: ICD-10-CM

## 2024-04-21 LAB
ACANTHOCYTES BLD QL SMEAR: NORMAL
ANION GAP SERPL CALCULATED.3IONS-SCNC: 9 MMOL/L (ref 7–15)
AUER BODIES BLD QL SMEAR: NORMAL
BASO STIPL BLD QL SMEAR: NORMAL
BASOPHILS # BLD AUTO: 0 10E3/UL (ref 0–0.2)
BASOPHILS NFR BLD AUTO: 0 %
BITE CELLS BLD QL SMEAR: NORMAL
BLISTER CELLS BLD QL SMEAR: NORMAL
BUN SERPL-MCNC: 15.2 MG/DL (ref 8–23)
BURR CELLS BLD QL SMEAR: NORMAL
CALCIUM SERPL-MCNC: 8.7 MG/DL (ref 8.8–10.2)
CHLORIDE SERPL-SCNC: 90 MMOL/L (ref 98–107)
CREAT SERPL-MCNC: 0.68 MG/DL (ref 0.51–0.95)
DACRYOCYTES BLD QL SMEAR: NORMAL
DEPRECATED HCO3 PLAS-SCNC: 27 MMOL/L (ref 22–29)
EGFRCR SERPLBLD CKD-EPI 2021: 90 ML/MIN/1.73M2
ELLIPTOCYTES BLD QL SMEAR: NORMAL
EOSINOPHIL # BLD AUTO: 0 10E3/UL (ref 0–0.7)
EOSINOPHIL NFR BLD AUTO: 0 %
ERYTHROCYTE [DISTWIDTH] IN BLOOD BY AUTOMATED COUNT: 12.9 % (ref 10–15)
FRAGMENTS BLD QL SMEAR: NORMAL
GIANT PLATELETS BLD QL SMEAR: NORMAL
GLUCOSE SERPL-MCNC: 125 MG/DL (ref 70–99)
HCT VFR BLD AUTO: 35.1 % (ref 35–47)
HGB BLD-MCNC: 11.8 G/DL (ref 11.7–15.7)
HGB C CRYSTALS: NORMAL
HOWELL-JOLLY BOD BLD QL SMEAR: NORMAL
IMM GRANULOCYTES # BLD: 0 10E3/UL
IMM GRANULOCYTES NFR BLD: 0 %
LIPASE SERPL-CCNC: 18 U/L (ref 13–60)
LYMPHOCYTES # BLD AUTO: 1.8 10E3/UL (ref 0.8–5.3)
LYMPHOCYTES NFR BLD AUTO: 27 %
MCH RBC QN AUTO: 30.6 PG (ref 26.5–33)
MCHC RBC AUTO-ENTMCNC: 33.6 G/DL (ref 31.5–36.5)
MCV RBC AUTO: 91 FL (ref 78–100)
MONOCYTES # BLD AUTO: 0.5 10E3/UL (ref 0–1.3)
MONOCYTES NFR BLD AUTO: 8 %
NEUTROPHILS # BLD AUTO: 4.3 10E3/UL (ref 1.6–8.3)
NEUTROPHILS NFR BLD AUTO: 65 %
NEUTS HYPERSEG BLD QL SMEAR: NORMAL
NRBC # BLD AUTO: 0 10E3/UL
NRBC BLD AUTO-RTO: 0 /100
PATH REV: NORMAL
PLAT MORPH BLD: NORMAL
PLATELET # BLD AUTO: 180 10E3/UL (ref 150–450)
POLYCHROMASIA BLD QL SMEAR: NORMAL
POTASSIUM SERPL-SCNC: 3.9 MMOL/L (ref 3.4–5.3)
RBC # BLD AUTO: 3.86 10E6/UL (ref 3.8–5.2)
RBC AGGLUT BLD QL: NORMAL
RBC MORPH BLD: NORMAL
ROULEAUX BLD QL SMEAR: NORMAL
SICKLE CELLS BLD QL SMEAR: NORMAL
SMUDGE CELLS BLD QL SMEAR: NORMAL
SODIUM SERPL-SCNC: 126 MMOL/L (ref 135–145)
SPHEROCYTES BLD QL SMEAR: NORMAL
STOMATOCYTES BLD QL SMEAR: NORMAL
TARGETS BLD QL SMEAR: NORMAL
TOXIC GRANULES BLD QL SMEAR: NORMAL
VARIANT LYMPHS BLD QL SMEAR: NORMAL
WBC # BLD AUTO: 6.6 10E3/UL (ref 4–11)

## 2024-04-21 PROCEDURE — 96361 HYDRATE IV INFUSION ADD-ON: CPT | Performed by: EMERGENCY MEDICINE

## 2024-04-21 PROCEDURE — 250N000013 HC RX MED GY IP 250 OP 250 PS 637: Performed by: EMERGENCY MEDICINE

## 2024-04-21 PROCEDURE — 99284 EMERGENCY DEPT VISIT MOD MDM: CPT | Mod: 25 | Performed by: EMERGENCY MEDICINE

## 2024-04-21 PROCEDURE — 258N000003 HC RX IP 258 OP 636: Performed by: EMERGENCY MEDICINE

## 2024-04-21 PROCEDURE — 99284 EMERGENCY DEPT VISIT MOD MDM: CPT | Performed by: EMERGENCY MEDICINE

## 2024-04-21 PROCEDURE — 96374 THER/PROPH/DIAG INJ IV PUSH: CPT | Performed by: EMERGENCY MEDICINE

## 2024-04-21 PROCEDURE — 83690 ASSAY OF LIPASE: CPT | Performed by: EMERGENCY MEDICINE

## 2024-04-21 PROCEDURE — 85004 AUTOMATED DIFF WBC COUNT: CPT | Performed by: EMERGENCY MEDICINE

## 2024-04-21 PROCEDURE — 36415 COLL VENOUS BLD VENIPUNCTURE: CPT | Performed by: EMERGENCY MEDICINE

## 2024-04-21 PROCEDURE — 250N000011 HC RX IP 250 OP 636: Performed by: EMERGENCY MEDICINE

## 2024-04-21 PROCEDURE — 80048 BASIC METABOLIC PNL TOTAL CA: CPT | Performed by: EMERGENCY MEDICINE

## 2024-04-21 PROCEDURE — 96375 TX/PRO/DX INJ NEW DRUG ADDON: CPT | Performed by: EMERGENCY MEDICINE

## 2024-04-21 RX ORDER — ONDANSETRON 2 MG/ML
4 INJECTION INTRAMUSCULAR; INTRAVENOUS EVERY 30 MIN PRN
Status: DISCONTINUED | OUTPATIENT
Start: 2024-04-21 | End: 2024-04-21 | Stop reason: HOSPADM

## 2024-04-21 RX ORDER — KETOROLAC TROMETHAMINE 15 MG/ML
15 INJECTION, SOLUTION INTRAMUSCULAR; INTRAVENOUS ONCE
Status: COMPLETED | OUTPATIENT
Start: 2024-04-21 | End: 2024-04-21

## 2024-04-21 RX ORDER — ACETAMINOPHEN 325 MG/1
975 TABLET ORAL ONCE
Status: COMPLETED | OUTPATIENT
Start: 2024-04-21 | End: 2024-04-21

## 2024-04-21 RX ORDER — ONDANSETRON 4 MG/1
4 TABLET, FILM COATED ORAL EVERY 8 HOURS PRN
Qty: 20 TABLET | Refills: 0 | Status: SHIPPED | OUTPATIENT
Start: 2024-04-21 | End: 2024-08-06

## 2024-04-21 RX ORDER — LORAZEPAM 2 MG/ML
0.5 INJECTION INTRAMUSCULAR ONCE
Status: COMPLETED | OUTPATIENT
Start: 2024-04-21 | End: 2024-04-21

## 2024-04-21 RX ORDER — DIPHENHYDRAMINE HYDROCHLORIDE 50 MG/ML
25 INJECTION INTRAMUSCULAR; INTRAVENOUS ONCE
Status: COMPLETED | OUTPATIENT
Start: 2024-04-21 | End: 2024-04-21

## 2024-04-21 RX ADMIN — ACETAMINOPHEN 975 MG: 325 TABLET, FILM COATED ORAL at 16:49

## 2024-04-21 RX ADMIN — KETOROLAC TROMETHAMINE 15 MG: 15 INJECTION, SOLUTION INTRAMUSCULAR; INTRAVENOUS at 16:27

## 2024-04-21 RX ADMIN — SODIUM CHLORIDE 1000 ML: 9 INJECTION, SOLUTION INTRAVENOUS at 16:28

## 2024-04-21 RX ADMIN — DIPHENHYDRAMINE HYDROCHLORIDE 25 MG: 50 INJECTION INTRAMUSCULAR; INTRAVENOUS at 16:50

## 2024-04-21 RX ADMIN — LORAZEPAM 0.5 MG: 2 INJECTION INTRAMUSCULAR; INTRAVENOUS at 16:27

## 2024-04-21 ASSESSMENT — COLUMBIA-SUICIDE SEVERITY RATING SCALE - C-SSRS
1. IN THE PAST MONTH, HAVE YOU WISHED YOU WERE DEAD OR WISHED YOU COULD GO TO SLEEP AND NOT WAKE UP?: NO
2. HAVE YOU ACTUALLY HAD ANY THOUGHTS OF KILLING YOURSELF IN THE PAST MONTH?: NO
6. HAVE YOU EVER DONE ANYTHING, STARTED TO DO ANYTHING, OR PREPARED TO DO ANYTHING TO END YOUR LIFE?: NO

## 2024-04-21 ASSESSMENT — ACTIVITIES OF DAILY LIVING (ADL)
ADLS_ACUITY_SCORE: 35
ADLS_ACUITY_SCORE: 35
ADLS_ACUITY_SCORE: 33
ADLS_ACUITY_SCORE: 35
ADLS_ACUITY_SCORE: 35

## 2024-04-21 NOTE — ED TRIAGE NOTES
Pt has a brain tumor and dementia - she does get bad headaches, but son brought her in today because pt has not been able to get any relief from headache, she has had headaches for 24 hours.          Tarsorrhaphy Text: A tarsorrhaphy was performed using Frost sutures.

## 2024-04-21 NOTE — ED PROVIDER NOTES
ED Provider Note  [facility]      History     Chief Complaint   Patient presents with    Headache    Nausea & Vomiting     HPI  Irene Mullins is a 78 year old female who has medical history significant for anxiety, Alzheimer's disease, chronic tension type headache, meningioma, presenting the emergency department with son for concerns regarding headache, in addition to nausea, and vomiting.  Patient was seen at [facility] yesterday.  Was prescribed Augmentin, in addition to steroids for treatment of sinus type infection, with also potential respiratory source of infection.  Patient reports that she had worsening headache during the day today.  Typically this goes away by around noon.  She has awoken with headaches for the past many years, and actually decades.  However, today it was more severe, and associated with vomiting, and therefore presents to the emergency department.  No weakness of the arms, or legs.  No fever.  No chills.  No abdominal pain.        Independent Historian:        Review of External Notes:  I reviewed primary care visit from February 22.  Patient with dizziness of uncertain exact etiology.  Also was started on trazodone for sleep disturbance.      Allergies:  Allergies   Allergen Reactions    Oxycodone-Acetaminophen      Other reaction(s): Dizziness, GI Upset    Sulfamethoxazole-Trimethoprim      Other reaction(s): Other - Describe In Comment Field  Hot flashes, face and arms red for 1 hour after taking medication    Tramadol Nausea and Vomiting    Valproic Acid      Other reaction(s): Other - Describe In Comment Field  Hair loss, elevated liver enzymes       Problem List:    Patient Active Problem List    Diagnosis Date Noted    Major neurocognitive disorder due to Alzheimer's disease (H) 11/29/2022     Priority: Medium    Analgesic rebound headache 03/23/2022     Priority: Medium    Osteopenia 01/31/2017     Priority: Medium    Chronic tension-type  headache, intractable 10/20/2016     Priority: Medium    Right middle cranial fossa 2.2 x 1.9 x 1.8 cm meningioma 08/03/2016     Priority: Medium    Calcific tendinitis of shoulder 08/17/2015     Priority: Medium    Depression with anxiety 07/10/2015     Priority: Medium    Insomnia 08/19/2014     Priority: Medium     Overview:   From a visit with Gail Boggs NP on 7/8/13--  Taking amitriptyline since last colonoscopy via EAP for both IBS and sleep 25 mg q hs. Has not tried melatonin.       Presbyesophagus 08/01/2014     Priority: Medium     Overview:   mild as confirmed by EGD      History of basal cell carcinoma 11/29/2011     Priority: Medium     Overview:   Under bridge of left nare had BCC removed 12/2010, has yearly f/u with dermatology      Seborrheic keratosis 11/29/2011     Priority: Medium    Irritable bowel syndrome 01/30/2008     Priority: Medium        Past Medical History:    No past medical history on file.    Past Surgical History:    Past Surgical History:   Procedure Laterality Date    COLONOSCOPY N/A 8/31/2022    Procedure: COLONOSCOPY;  Surgeon: Ray Cartwright DO;  Location: [facility]       Family History:    Family History   Problem Relation Age of Onset    Hypertension Mother     Myocardial Infarction Mother     Diabetes Sister     Coronary Artery Disease Sister     Hyperlipidemia Sister     Hyperlipidemia Brother     Cerebrovascular Disease No family hx of     Breast Cancer No family hx of     Colon Cancer No family hx of     Prostate Cancer No family hx of        Social History:  Marital Status:   [2]  Social History     Tobacco Use    Smoking status: Former    Smokeless tobacco: Never   Vaping Use    Vaping status: Never Used   Substance Use Topics    Alcohol use: Not Currently     Alcohol/week: 0.0 standard drinks of alcohol        Medications:    ondansetron (ZOFRAN) 4 MG tablet  citalopram (CELEXA) 40 MG tablet  Ginkgo Biloba 40 MG TABS  melatonin 5 MG tablet  memantine  (NAMENDA) 10 MG tablet  omeprazole (PRILOSEC) 40 MG DR capsule  ondansetron (ZOFRAN ODT) 4 MG ODT tab  rivastigmine (EXELON) 3 MG capsule  traZODone (DESYREL) 50 MG tablet  VITAMIN D PO          Review of Systems  A medically appropriate review of systems was performed with pertinent positives and negatives noted in the HPI, and all other systems negative.    Physical Exam   Patient Vitals for the past 24 hrs:   BP Temp Temp src Pulse Resp SpO2   04/21/24 1900 115/65 -- -- 68 -- --   04/21/24 1830 119/80 -- -- 68 -- --   04/21/24 1800 123/81 -- -- 68 -- 99 %   04/21/24 1730 139/84 -- -- 73 -- --   04/21/24 1700 (!) 151/108 -- -- 76 -- 99 %   04/21/24 1503 132/75 97.9  F (36.6  C) Oral 64 16 98 %          Physical Exam  General: alert and anxious appearing on arrival  Head: atraumatic, normocephalic  Lungs:  nonlabored  CV:  extremities warm and perfused  Abd: nondistended  Skin: no rashes, no diaphoresis and skin color normal  Neuro: Patient awake, alert, speech is fluent,   Psychiatric: affect/mood normal,        ED Course                 Procedures                           Results for orders placed or performed during the hospital encounter of 04/21/24 (from the past 24 hour(s))   CBC with platelets differential    Narrative    The following orders were created for panel order CBC with platelets differential.  Procedure                               Abnormality         Status                     ---------                               -----------         ------                     CBC with platelets and d...[[id number]]                      Final result               RBC and Platelet Morphology[[id number]]                      Final result                 Please view results for these tests on the individual orders.   Basic metabolic panel   Result Value Ref Range    Sodium 126 (L) 135 - 145 mmol/L    Potassium 3.9 3.4 - 5.3 mmol/L    Chloride 90 (L) 98 - 107 mmol/L    Carbon Dioxide (CO2) 27 22 - 29 mmol/L     Anion Gap 9 7 - 15 mmol/L    Urea Nitrogen 15.2 8.0 - 23.0 mg/dL    Creatinine 0.68 0.51 - 0.95 mg/dL    GFR Estimate 90 >60 mL/min/1.73m2    Calcium 8.7 (L) 8.8 - 10.2 mg/dL    Glucose 125 (H) 70 - 99 mg/dL   Lipase   Result Value Ref Range    Lipase 18 13 - 60 U/L   CBC with platelets and differential   Result Value Ref Range    WBC Count 6.6 4.0 - 11.0 10e3/uL    RBC Count 3.86 3.80 - 5.20 10e6/uL    Hemoglobin 11.8 11.7 - 15.7 g/dL    Hematocrit 35.1 35.0 - 47.0 %    MCV 91 78 - 100 fL    MCH 30.6 26.5 - 33.0 pg    MCHC 33.6 31.5 - 36.5 g/dL    RDW 12.9 10.0 - 15.0 %    Platelet Count 180 150 - 450 10e3/uL    % Neutrophils 65 %    % Lymphocytes 27 %    % Monocytes 8 %    % Eosinophils 0 %    % Basophils 0 %    % Immature Granulocytes 0 %    NRBCs per 100 WBC 0 <1 /100    Absolute Neutrophils 4.3 1.6 - 8.3 10e3/uL    Absolute Lymphocytes 1.8 0.8 - 5.3 10e3/uL    Absolute Monocytes 0.5 0.0 - 1.3 10e3/uL    Absolute Eosinophils 0.0 0.0 - 0.7 10e3/uL    Absolute Basophils 0.0 0.0 - 0.2 10e3/uL    Absolute Immature Granulocytes 0.0 <=0.4 10e3/uL    Absolute NRBCs 0.0 10e3/uL   RBC and Platelet Morphology   Result Value Ref Range    RBC Morphology Confirmed RBC Indices     Platelet Assessment  Automated Count Confirmed. Platelet morphology is normal.     Automated Count Confirmed. Platelet morphology is normal.    Giant Platelets      Acanthocytes      Jarad Rods      Basophilic Stippling      Bite Cells      Blister Cells      Rory Cells      Elliptocytes      Hgb C Crystals      Cortez-Jolly Bodies      Hypersegmented Neutrophils      Polychromasia      RBC agglutination      RBC Fragments      Reactive Lymphocytes      Rouleaux      Sickle Cells      Smudge Cells      Spherocytes      Stomatocytes      Target Cells      Teardrop Cells      Toxic Neutrophils      Pathologist Review Comments (Blood)         MEDICATIONS GIVEN IN THE EMERGENCY DEPARTMENT:  Medications   ondansetron (ZOFRAN) injection 4 mg (has no  administration in time range)   sodium chloride 0.9% BOLUS 1,000 mL (0 mLs Intravenous Stopped 4/21/24 2006)   ketorolac (TORADOL) injection 15 mg (15 mg Intravenous $Given 4/21/24 1627)   LORazepam (ATIVAN) injection 0.5 mg (0.5 mg Intravenous $Given 4/21/24 1627)   acetaminophen (TYLENOL) tablet 975 mg (975 mg Oral $Given 4/21/24 1649)   diphenhydrAMINE (BENADRYL) injection 25 mg (25 mg Intravenous $Given 4/21/24 1650)           Independent Interpretation (X-rays, CTs, rhythm strip):  None    Consultations/Discussion of Management or Tests:  None       Social Determinants of Health affecting care:         Assessments & Plan (with Medical Decision Making)  78 year old female who presents to the Emergency Department for evaluation of headache.  This has been constant every day over the past many years.  No fever, or chills.  Patient with nonfocal neurologic exam.  Has had headache over the past many years.  No red flag symptoms.    Patient with laboratory workup showing slight hyponatremia at 126, with chloride of 90.  Was given 1 L of normal saline, which should improve this value.  She is approximately at baseline status according to son who is present in the room.  CBC is normal.  Lipase normal.    Patient was given Toradol, Ativan, and Benadryl.  Did get some relief with the Toradol, and Ativan.  However, Benadryl did make patient sleepy for some time.  She was able to tolerate crackers and aviles after awakening.    Upon repeat assessment, patient has not had any vomiting during her 5.5-hour ED course.  I considered hospital observation as patient does still have slight headache which is present.  However, this headache is slightly frontal in nature, which patient has had over the past many years.  It is not significantly different.  Patient was recently started on Augmentin for sinus infection.  Encouraged compliance on that medication, Zofran prescription also given.  Follow-up if not improving, with return  instructions discussed.    Considered imaging studies, however patient appears to have acute exacerbation of her chronic headaches, and therefore I do not feel further acute imaging is indicated at this point.       I have reviewed the nursing notes.    I have reviewed the findings, diagnosis, plan and need for follow up with the patient.       Critical Care time:  none      NEW PRESCRIPTIONS STARTED AT TODAY'S ER VISIT  Discharge Medication List as of 4/21/2024  8:06 PM        START taking these medications    Details   ondansetron (ZOFRAN) 4 MG tablet Take 1 tablet (4 mg) by mouth every 8 hours as needed for nausea, Disp-20 tablet, R-0, E-Prescribe             Final diagnoses:   Nausea   Acute nonintractable headache, unspecified headache type   Major neurocognitive disorder due to Alzheimer's disease (H)       4/21/2024   [facility] EMERGENCY DEPT       Stevan Flores MD  04/21/24 2023

## 2024-04-21 NOTE — ED TRIAGE NOTES
Pt to ER today for nausea vomiting that started Saturday afternoon and was started on antibiotics and prednisone. Pt has history of brain tumors and provider at Urgent Care felt that pt needed to be evaluated in the ED.      Triage Assessment (Adult)       Row Name 04/21/24 1512          Triage Assessment    Airway WDL WDL        Respiratory WDL    Respiratory WDL WDL        Skin Circulation/Temperature WDL    Skin Circulation/Temperature WDL WDL        Cardiac WDL    Cardiac WDL WDL        Peripheral/Neurovascular WDL    Peripheral Neurovascular WDL WDL        Cognitive/Neuro/Behavioral WDL    Cognitive/Neuro/Behavioral WDL X        Pupils (CN II)    Pupil PERRLA yes

## 2024-04-22 NOTE — DISCHARGE INSTRUCTIONS
Follow-up in clinic for routine cares.    Continue home medications.    Follow-up in clinic if not improving.

## 2024-04-30 ENCOUNTER — TELEPHONE (OUTPATIENT)
Dept: SLEEP MEDICINE | Facility: CLINIC | Age: 79
End: 2024-04-30

## 2024-05-02 ENCOUNTER — OFFICE VISIT (OUTPATIENT)
Dept: FAMILY MEDICINE | Facility: CLINIC | Age: 79
End: 2024-05-02
Payer: COMMERCIAL

## 2024-05-02 VITALS
HEIGHT: 63 IN | SYSTOLIC BLOOD PRESSURE: 116 MMHG | BODY MASS INDEX: 21.62 KG/M2 | RESPIRATION RATE: 16 BRPM | DIASTOLIC BLOOD PRESSURE: 76 MMHG | WEIGHT: 122 LBS | HEART RATE: 72 BPM | OXYGEN SATURATION: 98 % | TEMPERATURE: 97.8 F

## 2024-05-02 DIAGNOSIS — M54.81 BILATERAL OCCIPITAL NEURALGIA: Primary | ICD-10-CM

## 2024-05-02 PROCEDURE — 99214 OFFICE O/P EST MOD 30 MIN: CPT | Performed by: NURSE PRACTITIONER

## 2024-05-17 DIAGNOSIS — G47.09 OTHER INSOMNIA: ICD-10-CM

## 2024-05-17 DIAGNOSIS — K21.9 GASTROESOPHAGEAL REFLUX DISEASE WITHOUT ESOPHAGITIS: ICD-10-CM

## 2024-05-17 DIAGNOSIS — F41.9 ANXIETY: ICD-10-CM

## 2024-05-17 RX ORDER — CITALOPRAM HYDROBROMIDE 40 MG/1
40 TABLET ORAL DAILY
Qty: 90 TABLET | Refills: 1 | Status: SHIPPED | OUTPATIENT
Start: 2024-05-17

## 2024-05-17 RX ORDER — OMEPRAZOLE 40 MG/1
40 CAPSULE, DELAYED RELEASE ORAL DAILY
Qty: 90 CAPSULE | Refills: 1 | Status: SHIPPED | OUTPATIENT
Start: 2024-05-17

## 2024-05-17 RX ORDER — TRAZODONE HYDROCHLORIDE 50 MG/1
25 TABLET, FILM COATED ORAL AT BEDTIME
Qty: 45 TABLET | Refills: 1 | Status: SHIPPED | OUTPATIENT
Start: 2024-05-17

## 2024-07-02 ENCOUNTER — OFFICE VISIT (OUTPATIENT)
Dept: PALLIATIVE MEDICINE | Facility: OTHER | Age: 79
End: 2024-07-02
Attending: NURSE PRACTITIONER
Payer: COMMERCIAL

## 2024-07-02 VITALS — SYSTOLIC BLOOD PRESSURE: 148 MMHG | HEART RATE: 68 BPM | OXYGEN SATURATION: 96 % | DIASTOLIC BLOOD PRESSURE: 72 MMHG

## 2024-07-02 DIAGNOSIS — M54.81 BILATERAL OCCIPITAL NEURALGIA: ICD-10-CM

## 2024-07-02 PROCEDURE — 250N000011 HC RX IP 250 OP 636: Performed by: STUDENT IN AN ORGANIZED HEALTH CARE EDUCATION/TRAINING PROGRAM

## 2024-07-02 PROCEDURE — 64405 NJX AA&/STRD GR OCPL NRV: CPT | Mod: 50 | Performed by: STUDENT IN AN ORGANIZED HEALTH CARE EDUCATION/TRAINING PROGRAM

## 2024-07-02 RX ORDER — TRIAMCINOLONE ACETONIDE 40 MG/ML
40 INJECTION, SUSPENSION INTRA-ARTICULAR; INTRAMUSCULAR ONCE
Status: COMPLETED | OUTPATIENT
Start: 2024-07-02 | End: 2024-07-02

## 2024-07-02 RX ORDER — ROPIVACAINE HYDROCHLORIDE 5 MG/ML
4 INJECTION, SOLUTION EPIDURAL; INFILTRATION; PERINEURAL ONCE
Status: COMPLETED | OUTPATIENT
Start: 2024-07-02 | End: 2024-07-02

## 2024-07-02 RX ADMIN — TRIAMCINOLONE ACETONIDE 40 MG: 40 INJECTION, SUSPENSION INTRA-ARTICULAR; INTRAMUSCULAR at 13:46

## 2024-07-02 RX ADMIN — ROPIVACAINE HYDROCHLORIDE 4 ML: 5 INJECTION EPIDURAL; INFILTRATION; PERINEURAL at 13:46

## 2024-07-02 ASSESSMENT — PAIN SCALES - GENERAL
PAINLEVEL: WORST PAIN (10)
PAINLEVEL: MODERATE PAIN (4)

## 2024-07-02 NOTE — PATIENT INSTRUCTIONS
[facility]  Post Procedure Instructions      Today you saw:  Dr. Guevara      Today you had:  occipital nerve block                         Medications used:  ropivacaine   kenolog             Go to the emergency room if you develop any shortness of breath      Monitor the injection sites for signs and symptoms of infection-fever, chills, redness, swelling, warmth, or drainage to areas.      You may have soreness at injection sites for up to 24 hours.      It may take up to 14 days for the steroid medication to start working although you may feel the effect as early as a few days after the procedure.         You may apply ice to the painful areas to help minimize the discomfort of the needle pokes.      Do not apply heat to sites for at least 12 hours.      You may use anti-inflammatory medications or Tylenol for pain control if necessary       Pain Clinic phone number:  [phone]

## 2024-07-02 NOTE — PROGRESS NOTES
Pre procedure Diagnosis: Bilateral occipital neuralgia   Post procedure Diagnosis: Same  Procedure performed: Bilateral  greater occipital nerve blocks, CPT code 53763  Anesthesia: none  Complications: none  Operators: JORDAN Cooley MD    Indications:   Irene Mullins is a 79 year old female with a history of right occipital neuralgia.    Options/alternatives, benefits and risks were discussed with the patient including bleeding, infection, hematoma, nerve damage, stroke, and spinal cord injury.  Questions were answered to her satisfaction and she agrees to proceed. Voluntary informed consent was obtained and signed.     Vitals were reviewed: Yes  Allergies were reviewed:  Yes   Medications were reviewed:  Yes   Pre-procedure pain score: 4/10    Procedure:  After getting informed consent, a Pause for the Cause was performed.    The occipital ridge, occipital protuberance, and mastoid process were palpated on the right side.  The location of maximal tenderness which was consistent with the location of the right occipital nerve palpated.  The area was cleaned.    Palpation for a pulse was completed, with no pulse noted at the site of the injection.  A 25G, 1.5 inch needle was introduced, aimed cephalad, in the superficial tissues at this area of tenderness.  The injection was completed at this location. Aspiration for heme was negative before all injections.    In total, 2ml of 0.5% ropivacaine and 20 mg Kenalog was injected.     Then the procedure was repeated on the opposite site.     The patient tolerated the procedure well, hemostasis was achieved.      Post-procedure pain score: 10/10  Follow-up includes:   -f/u with referring provider  -schedule prn    Procedure was done with Dr. Guevara       Physician Attestation   I, Anrdea Guevara MD, was present for all key and critical portions of the procedure, and I was immediately available during the remainder of the procedure.  Any changes to the  documentation have been made above.    Andrea Guevara MD  Interventional Pain Medicine  [facility]

## 2024-08-06 ENCOUNTER — OFFICE VISIT (OUTPATIENT)
Dept: FAMILY MEDICINE | Facility: CLINIC | Age: 79
End: 2024-08-06
Payer: COMMERCIAL

## 2024-08-06 VITALS
BODY MASS INDEX: 20.38 KG/M2 | HEIGHT: 63 IN | WEIGHT: 115 LBS | OXYGEN SATURATION: 94 % | RESPIRATION RATE: 20 BRPM | HEART RATE: 92 BPM | SYSTOLIC BLOOD PRESSURE: 134 MMHG | TEMPERATURE: 96.9 F | DIASTOLIC BLOOD PRESSURE: 64 MMHG

## 2024-08-06 DIAGNOSIS — F41.9 ANXIETY: ICD-10-CM

## 2024-08-06 DIAGNOSIS — R51.9 CHRONIC DAILY HEADACHE: ICD-10-CM

## 2024-08-06 DIAGNOSIS — F02.80 MAJOR NEUROCOGNITIVE DISORDER DUE TO ALZHEIMER'S DISEASE (H): Primary | ICD-10-CM

## 2024-08-06 DIAGNOSIS — R07.89 CHEST WALL PAIN: ICD-10-CM

## 2024-08-06 DIAGNOSIS — G30.9 MAJOR NEUROCOGNITIVE DISORDER DUE TO ALZHEIMER'S DISEASE (H): Primary | ICD-10-CM

## 2024-08-06 PROCEDURE — 99214 OFFICE O/P EST MOD 30 MIN: CPT | Performed by: NURSE PRACTITIONER

## 2024-08-06 PROCEDURE — G2211 COMPLEX E/M VISIT ADD ON: HCPCS | Performed by: NURSE PRACTITIONER

## 2024-08-06 ASSESSMENT — ENCOUNTER SYMPTOMS: HEADACHES: 1

## 2024-08-06 ASSESSMENT — PAIN SCALES - GENERAL: PAINLEVEL: WORST PAIN (10)

## 2024-08-06 NOTE — PROGRESS NOTES
Assessment & Plan     Major neurocognitive disorder due to Alzheimer's disease (H)  Much of her symptoms she comes in with today are related to previous complaints. She is very aware of her dementia and this has become distressing for her. I did not make medication changes today, would consider mirtazapine as an option or Seroquel for the increased anxiety she is having on a regular basis. Referral placed today for management of her mental health and coping with her diagnosis of dementia.   - Adult Mental Health  Referral; Future    Chest wall pain  Muscle tightness likely contributing to the pain she is having. It is across entire left anterior chest and will radiate around the ribs and to the shoulder. I suspect some of this to be related to her tremors that are apparent in her left hand and worsened during the visit when more agitated.     Chronic daily headache  Does not desire to repeat injections for headaches. I discussed trying to increase frequency of the tylenol from 1 x daily to two times daily.     Anxiety  As above. Related to her dementia. May benefit from seroquel to help with management of the symptoms. Not added to discussed today with patient.         Kacy Bonilla is a 79 year old, presenting for the following health issues:  Pain (Wide spread pain upon waking, gets better through the day.), Dizziness (Each morning, ), and Headache        8/6/2024    10:49 AM   Additional Questions   Roomed by Laney THOMPSON MA   Accompanied by  Ian         8/6/2024    10:49 AM   Patient Reported Additional Medications   Patient reports taking the following new medications none     Pain  Associated symptoms include headaches.   Headache     History of Present Illness       Headaches:   Since the patient's last clinic visit, headaches are: worsened  The patient is getting headaches:  DAYLY  She is able to do normal daily activities when she has a migraine.  The patient is taking the following  "rescue/relief medications:  Excedrin   Patient states \"I get only a small amount of relief\" from the rescue/relief medications.   The patient is taking the following medications to prevent migraines:  Other  In the past 4 weeks, the patient has gone to an Urgent Care or Emergency Room 2 times times due to headaches.    She eats 0-1 servings of fruits and vegetables daily.She consumes 0 sweetened beverage(s) daily.She exercises with enough effort to increase her heart rate 9 or less minutes per day.    She is taking medications regularly.     Patient with alzhiemers. She is a poor historian. She \"hurts all over\". Having complaints of left upper chest pain. Recently had injections in her neck to help with headaches and she finds that she can't lay on her side any more.    She is feeling distressed with her dementia. She takes her medications in the morning. She is having more tremors. This has been observed by  to be present during increased agitation. She is very aware of her dementia limitations and in conversation about this she became more agitated and tremors increased. Tremors reduced when she became more calm during visit.     Family sets up her medications. She tries to ADLs and gets lost in the moment.     She reports that the chest pain seems a little worse.  confirms this. He also mentions she has seemed a little more distressed recently.     Tylenol helps the pain. She typically takes this in the morning. They have not taken this at night. She reports her mornings are the worse time of the day.     She continues to take memantine as well as rivastigmine.         Review of Systems  Constitutional, HEENT, cardiovascular, pulmonary, gi and gu systems are negative, except as otherwise noted.      Objective    /64 (BP Location: Right arm, Patient Position: Sitting, Cuff Size: Adult Regular)   Pulse 92   Temp 96.9  F (36.1  C) (Tympanic)   Resp 20   Ht 1.588 m (5' 2.5\")   Wt 52.2 kg (115 " lb)   SpO2 94%   BMI 20.70 kg/m    Body mass index is 20.7 kg/m .  Physical Exam   GENERAL: alert, mild distress, and elderly.   RESP: lungs clear to auscultation - no rales, rhonchi or wheezes  CV: regular rate and rhythm, normal S1 S2, no S3 or S4, no murmur, click or rub, no peripheral edema  MS: no gross musculoskeletal defects noted, no edema  NEURO: tremor of bilateral hands present left worse than right. Observed worse with agitation  PSYCH: concentration poor, confused, tangential, anxious, speech pressured, and appearance well groomed            Signed Electronically by: NGOC Cardoso CNP

## 2024-11-13 DIAGNOSIS — G30.9 MAJOR NEUROCOGNITIVE DISORDER DUE TO ALZHEIMER'S DISEASE (H): ICD-10-CM

## 2024-11-13 DIAGNOSIS — F02.80 MAJOR NEUROCOGNITIVE DISORDER DUE TO ALZHEIMER'S DISEASE (H): ICD-10-CM

## 2024-11-13 DIAGNOSIS — K21.9 GASTROESOPHAGEAL REFLUX DISEASE WITHOUT ESOPHAGITIS: ICD-10-CM

## 2024-11-13 DIAGNOSIS — G47.09 OTHER INSOMNIA: ICD-10-CM

## 2024-11-13 RX ORDER — RIVASTIGMINE TARTRATE 3 MG/1
3 CAPSULE ORAL 2 TIMES DAILY
Qty: 60 CAPSULE | Refills: 1 | Status: SHIPPED | OUTPATIENT
Start: 2024-11-13

## 2024-11-13 RX ORDER — MEMANTINE HYDROCHLORIDE 10 MG/1
10 TABLET ORAL 2 TIMES DAILY
Qty: 60 TABLET | Refills: 1 | Status: SHIPPED | OUTPATIENT
Start: 2024-11-13

## 2024-11-13 NOTE — TELEPHONE ENCOUNTER
Refill request for: memantine 10mg  Directions: Take 1 tablet (10 mg) by mouth 2 times daily     Refill request for: rivastigmine 3mg   Directions: Take 1 capsule (3 mg) by mouth 2 times daily     LOV: 12/13/23  NOV: 12/16/24    30 day supply with 1 refills Medication T'd for review and signature    Kylie Gayle LPN on 11/13/2024 at 11:30 AM

## 2024-11-14 RX ORDER — OMEPRAZOLE 40 MG/1
40 CAPSULE, DELAYED RELEASE ORAL DAILY
Qty: 90 CAPSULE | Refills: 1 | Status: SHIPPED | OUTPATIENT
Start: 2024-11-14

## 2024-11-14 RX ORDER — TRAZODONE HYDROCHLORIDE 50 MG/1
25 TABLET, FILM COATED ORAL AT BEDTIME
Qty: 45 TABLET | Refills: 0 | Status: SHIPPED | OUTPATIENT
Start: 2024-11-14

## 2025-01-02 ENCOUNTER — TELEPHONE (OUTPATIENT)
Dept: FAMILY MEDICINE | Facility: CLINIC | Age: 80
End: 2025-01-02
Payer: COMMERCIAL

## 2025-01-02 NOTE — TELEPHONE ENCOUNTER
Patient Quality Outreach    Patient is due for the following:   None    Action(s) Taken:   - Schedule wellness visit  - Vaccine update    Type of outreach:    Sent letter.    Questions for provider review:    None           Saray Jimenez, CMA

## 2025-01-02 NOTE — LETTER
January 2, 2025      Irene Mullins  [address]   [place] [address]        Dear Irene,     You are due to schedule your annual wellness visit (physical).    Feel free to call [phone] to schedule an appointment to get these updated!        Sincerely,      Your [facility] Team

## 2025-01-14 ENCOUNTER — TELEPHONE (OUTPATIENT)
Dept: PSYCHOLOGY | Facility: CLINIC | Age: 80
End: 2025-01-14
Payer: COMMERCIAL

## 2025-01-20 DIAGNOSIS — F02.80 MAJOR NEUROCOGNITIVE DISORDER DUE TO ALZHEIMER'S DISEASE (H): ICD-10-CM

## 2025-01-20 DIAGNOSIS — G30.9 MAJOR NEUROCOGNITIVE DISORDER DUE TO ALZHEIMER'S DISEASE (H): ICD-10-CM

## 2025-01-20 NOTE — LETTER
1/20/2025      Irene Mullins  [address]  [place] [address]                Dear Irene,         We recently provided you with medication refills.  Many medications require routine follow-up with your doctor. As our neurologists are booking out several (6+) months, please contact us at your earliest convenience at [phone] to avoid any interruptions in your medication refills.     Your prescription(s) have been refilled for 30 days so you may have time for the above noted follow-up. Please call to schedule soon so we can assure you have an appointment before your next refills are needed. If you have already made a follow up appointment, please disregard this letter.             Sincerely,  [facility]   Dr. Foss's Care Team

## 2025-01-21 RX ORDER — MEMANTINE HYDROCHLORIDE 10 MG/1
10 TABLET ORAL 2 TIMES DAILY
Qty: 60 TABLET | Refills: 0 | Status: SHIPPED | OUTPATIENT
Start: 2025-01-21

## 2025-01-21 RX ORDER — RIVASTIGMINE TARTRATE 3 MG/1
3 CAPSULE ORAL 2 TIMES DAILY
Qty: 60 CAPSULE | Refills: 0 | Status: SHIPPED | OUTPATIENT
Start: 2025-01-21

## 2025-01-21 NOTE — TELEPHONE ENCOUNTER
Refill request for: rivastigmine 3mg   Directions: Take 1 capsule (3 mg) by mouth 2 times daily     Refill request for: memantine  10mg  Directions: Take 1 tablet (10 mg) by mouth 2 times daily     LOV: 12/13/23  NOV: No future appt scheduled. Letter mailed to pt to remind to schedule.    Pt of Dr. Foss's. He is out of the office. Can you refill in his absence?    30 day supply with 0 refills Medication T'd for review and signature      Kylie Gayle LPN on 1/21/2025 at 2:42 PM

## 2025-02-24 DIAGNOSIS — F02.80 MAJOR NEUROCOGNITIVE DISORDER DUE TO ALZHEIMER'S DISEASE (H): ICD-10-CM

## 2025-02-24 DIAGNOSIS — G30.9 MAJOR NEUROCOGNITIVE DISORDER DUE TO ALZHEIMER'S DISEASE (H): ICD-10-CM

## 2025-02-25 RX ORDER — RIVASTIGMINE TARTRATE 3 MG/1
3 CAPSULE ORAL 2 TIMES DAILY
Qty: 28 CAPSULE | Refills: 0 | Status: SHIPPED | OUTPATIENT
Start: 2025-02-25

## 2025-02-25 RX ORDER — MEMANTINE HYDROCHLORIDE 10 MG/1
10 TABLET ORAL 2 TIMES DAILY
Qty: 28 TABLET | Refills: 0 | Status: SHIPPED | OUTPATIENT
Start: 2025-02-25

## 2025-02-25 NOTE — TELEPHONE ENCOUNTER
Refill request for: memantine 10mg   Directions:Take 1 tablet (10 mg) by mouth 2 times daily.      Refill request for: rivastigmine 3mg   Directions: Take 1 capsule (3 mg) by mouth 2 times daily.     LOV: 12/13/23  NOV: No future appt scheduled. Letter has already been mailed to pt to remind to schedule.    Pt of Dr. Foss. He is out of the office until 3/2/25. Can you refill in his absence?    14 day supply with 0 refills Medication T'd for review and signature      Kylie Gayle LPN on 2/25/2025 at 11:06 AM

## 2025-03-29 DIAGNOSIS — F02.80 MAJOR NEUROCOGNITIVE DISORDER DUE TO ALZHEIMER'S DISEASE (H): ICD-10-CM

## 2025-03-29 DIAGNOSIS — G30.9 MAJOR NEUROCOGNITIVE DISORDER DUE TO ALZHEIMER'S DISEASE (H): ICD-10-CM

## 2025-03-31 NOTE — TELEPHONE ENCOUNTER
Refill request for: rivastigmine 3mg   Directions: Take 1 capsule (3 mg) by mouth 2 times daily     Refill request for: memantine 10mg   Directions: Take 1 tablet (10 mg) by mouth 2 times daily     LOV: 12/13/23  NOV: No future appt scheduled. Letter has already been mailed to pt to remind to schedule, still no appt scheduled.    7 day supply with 0 refills Medication T'd for review and signature      Kylie Gayle LPN on 3/31/2025 at 10:21 AM

## 2025-04-01 RX ORDER — RIVASTIGMINE TARTRATE 3 MG/1
3 CAPSULE ORAL 2 TIMES DAILY
Qty: 14 CAPSULE | Refills: 0 | Status: SHIPPED | OUTPATIENT
Start: 2025-04-01

## 2025-04-01 RX ORDER — MEMANTINE HYDROCHLORIDE 10 MG/1
10 TABLET ORAL 2 TIMES DAILY
Qty: 14 TABLET | Refills: 0 | Status: SHIPPED | OUTPATIENT
Start: 2025-04-01

## 2025-04-08 ENCOUNTER — LAB REQUISITION (OUTPATIENT)
Dept: LAB | Facility: CLINIC | Age: 80
End: 2025-04-08
Payer: COMMERCIAL

## 2025-04-08 DIAGNOSIS — R35.0 FREQUENCY OF MICTURITION: ICD-10-CM

## 2025-04-08 DIAGNOSIS — R94.6 ABNORMAL RESULTS OF THYROID FUNCTION STUDIES: ICD-10-CM

## 2025-04-08 DIAGNOSIS — K92.1 MELENA: ICD-10-CM

## 2025-04-08 DIAGNOSIS — F02.818 DEMENTIA IN OTHER DISEASES CLASSIFIED ELSEWHERE, UNSPECIFIED SEVERITY, WITH OTHER BEHAVIORAL DISTURBANCE (H): ICD-10-CM

## 2025-04-08 LAB
ALBUMIN UR-MCNC: NEGATIVE MG/DL
APPEARANCE UR: CLEAR
BILIRUB UR QL STRIP: NEGATIVE
COLOR UR AUTO: ABNORMAL
GLUCOSE UR STRIP-MCNC: NEGATIVE MG/DL
HGB UR QL STRIP: ABNORMAL
KETONES UR STRIP-MCNC: NEGATIVE MG/DL
LEUKOCYTE ESTERASE UR QL STRIP: NEGATIVE
MUCOUS THREADS #/AREA URNS LPF: PRESENT /LPF
NITRATE UR QL: NEGATIVE
PH UR STRIP: 6 [PH] (ref 5–7)
RBC URINE: 3 /HPF
SP GR UR STRIP: 1.02 (ref 1–1.03)
SQUAMOUS EPITHELIAL: <1 /HPF
UROBILINOGEN UR STRIP-MCNC: NORMAL MG/DL
WBC URINE: 1 /HPF

## 2025-04-08 PROCEDURE — 81001 URINALYSIS AUTO W/SCOPE: CPT | Mod: ORL

## 2025-04-09 LAB
ALBUMIN SERPL BCG-MCNC: 3.9 G/DL (ref 3.5–5.2)
ALP SERPL-CCNC: 64 U/L (ref 40–150)
ALT SERPL W P-5'-P-CCNC: 16 U/L (ref 0–50)
ANION GAP SERPL CALCULATED.3IONS-SCNC: 11 MMOL/L (ref 7–15)
AST SERPL W P-5'-P-CCNC: 20 U/L (ref 0–45)
BASOPHILS # BLD AUTO: 0 10E3/UL (ref 0–0.2)
BASOPHILS NFR BLD AUTO: 1 %
BILIRUB SERPL-MCNC: 0.4 MG/DL
BUN SERPL-MCNC: 11 MG/DL (ref 8–23)
CALCIUM SERPL-MCNC: 9.8 MG/DL (ref 8.8–10.4)
CHLORIDE SERPL-SCNC: 99 MMOL/L (ref 98–107)
CREAT SERPL-MCNC: 0.72 MG/DL (ref 0.51–0.95)
EGFRCR SERPLBLD CKD-EPI 2021: 86 ML/MIN/1.73M2
EOSINOPHIL # BLD AUTO: 0 10E3/UL (ref 0–0.7)
EOSINOPHIL NFR BLD AUTO: 1 %
ERYTHROCYTE [DISTWIDTH] IN BLOOD BY AUTOMATED COUNT: 12.7 % (ref 10–15)
GLUCOSE SERPL-MCNC: 124 MG/DL (ref 70–99)
HCO3 SERPL-SCNC: 26 MMOL/L (ref 22–29)
HCT VFR BLD AUTO: 36.8 % (ref 35–47)
HGB BLD-MCNC: 12 G/DL (ref 11.7–15.7)
IMM GRANULOCYTES # BLD: 0 10E3/UL
IMM GRANULOCYTES NFR BLD: 0 %
LYMPHOCYTES # BLD AUTO: 1.9 10E3/UL (ref 0.8–5.3)
LYMPHOCYTES NFR BLD AUTO: 33 %
MCH RBC QN AUTO: 30.4 PG (ref 26.5–33)
MCHC RBC AUTO-ENTMCNC: 32.6 G/DL (ref 31.5–36.5)
MCV RBC AUTO: 93 FL (ref 78–100)
MONOCYTES # BLD AUTO: 0.3 10E3/UL (ref 0–1.3)
MONOCYTES NFR BLD AUTO: 6 %
NEUTROPHILS # BLD AUTO: 3.5 10E3/UL (ref 1.6–8.3)
NEUTROPHILS NFR BLD AUTO: 60 %
NRBC # BLD AUTO: 0 10E3/UL
NRBC BLD AUTO-RTO: 0 /100
PLATELET # BLD AUTO: 280 10E3/UL (ref 150–450)
POTASSIUM SERPL-SCNC: 3.6 MMOL/L (ref 3.4–5.3)
PROT SERPL-MCNC: 6.9 G/DL (ref 6.4–8.3)
RBC # BLD AUTO: 3.95 10E6/UL (ref 3.8–5.2)
SODIUM SERPL-SCNC: 136 MMOL/L (ref 135–145)
TSH SERPL DL<=0.005 MIU/L-ACNC: 0.84 UIU/ML (ref 0.3–4.2)
VIT B12 SERPL-MCNC: 543 PG/ML (ref 232–1245)
WBC # BLD AUTO: 5.9 10E3/UL (ref 4–11)

## 2025-04-09 PROCEDURE — 82607 VITAMIN B-12: CPT | Mod: ORL

## 2025-04-10 ENCOUNTER — TELEPHONE (OUTPATIENT)
Dept: FAMILY MEDICINE | Facility: CLINIC | Age: 80
End: 2025-04-10
Payer: COMMERCIAL

## 2025-04-10 NOTE — TELEPHONE ENCOUNTER
Patient Quality Outreach    Patient is due for the following:   None    Action(s) Taken:   - Schedule wellness visit with fasting labs  - Vaccine update    Type of outreach:    Phone, left message for patient/parent to call back.    Questions for provider review:    None         Saray Jimenez, [facility]  Chart routed to Care Team.

## 2025-04-17 NOTE — TELEPHONE ENCOUNTER
Looks like pt was seen in [place] and orders were written for new Assisted Living/Memory Care.    Pt is no longer receiving care here.

## 2025-04-22 ENCOUNTER — LAB REQUISITION (OUTPATIENT)
Dept: LAB | Facility: CLINIC | Age: 80
End: 2025-04-22
Payer: COMMERCIAL

## 2025-04-22 DIAGNOSIS — K92.1 MELENA: ICD-10-CM

## 2025-04-23 PROCEDURE — 82272 OCCULT BLD FECES 1-3 TESTS: CPT | Mod: ORL

## 2025-04-26 LAB — HEMOCCULT STL QL: NEGATIVE
